# Patient Record
Sex: FEMALE | Race: WHITE | NOT HISPANIC OR LATINO | Employment: OTHER | URBAN - METROPOLITAN AREA
[De-identification: names, ages, dates, MRNs, and addresses within clinical notes are randomized per-mention and may not be internally consistent; named-entity substitution may affect disease eponyms.]

---

## 2024-06-04 ENCOUNTER — APPOINTMENT (OUTPATIENT)
Dept: OTHER | Facility: HOSPITAL | Age: 74
End: 2024-06-04
Payer: MEDICARE

## 2024-06-05 ENCOUNTER — APPOINTMENT (OUTPATIENT)
Dept: CARDIOLOGY | Facility: HOSPITAL | Age: 74
End: 2024-06-05
Payer: MEDICARE

## 2024-06-05 ENCOUNTER — PREP FOR SURGERY (OUTPATIENT)
Dept: OTHER | Facility: HOSPITAL | Age: 74
End: 2024-06-05
Payer: MEDICARE

## 2024-06-05 ENCOUNTER — ANESTHESIA EVENT (OUTPATIENT)
Dept: PERIOP | Facility: HOSPITAL | Age: 74
End: 2024-06-05
Payer: MEDICARE

## 2024-06-05 ENCOUNTER — HOSPITAL ENCOUNTER (INPATIENT)
Facility: HOSPITAL | Age: 74
LOS: 8 days | Discharge: HOME HEALTH | End: 2024-06-13
Attending: THORACIC SURGERY (CARDIOTHORACIC VASCULAR SURGERY) | Admitting: THORACIC SURGERY (CARDIOTHORACIC VASCULAR SURGERY)
Payer: MEDICARE

## 2024-06-05 ENCOUNTER — APPOINTMENT (OUTPATIENT)
Dept: GENERAL RADIOLOGY | Facility: HOSPITAL | Age: 74
End: 2024-06-05
Payer: MEDICARE

## 2024-06-05 DIAGNOSIS — Z98.890 POSTOPERATIVE HYPOXIA: ICD-10-CM

## 2024-06-05 DIAGNOSIS — R09.02 POSTOPERATIVE HYPOXIA: ICD-10-CM

## 2024-06-05 DIAGNOSIS — I25.110 CORONARY ARTERY DISEASE INVOLVING NATIVE CORONARY ARTERY OF NATIVE HEART WITH UNSTABLE ANGINA PECTORIS: Primary | ICD-10-CM

## 2024-06-05 DIAGNOSIS — J95.89 ACUTE RESPIRATORY INSUFFICIENCY, POSTOPERATIVE: ICD-10-CM

## 2024-06-05 DIAGNOSIS — Z95.1 S/P CABG X 3: ICD-10-CM

## 2024-06-05 PROBLEM — I21.4 NON-STEMI (NON-ST ELEVATED MYOCARDIAL INFARCTION): Status: ACTIVE | Noted: 2024-06-05

## 2024-06-05 PROBLEM — I25.10 CAD (CORONARY ARTERY DISEASE): Status: ACTIVE | Noted: 2024-06-05

## 2024-06-05 LAB
ABO GROUP BLD: NORMAL
ALBUMIN SERPL-MCNC: 3.9 G/DL (ref 3.5–5.2)
ALBUMIN/GLOB SERPL: 1.4 G/DL
ALP SERPL-CCNC: 69 U/L (ref 39–117)
ALT SERPL W P-5'-P-CCNC: 22 U/L (ref 1–33)
ANION GAP SERPL CALCULATED.3IONS-SCNC: 11.4 MMOL/L (ref 5–15)
APTT PPP: 43.2 SECONDS (ref 24–31)
ARTERIAL PATENCY WRIST A: POSITIVE
AST SERPL-CCNC: 38 U/L (ref 1–32)
ATMOSPHERIC PRESS: ABNORMAL MM[HG]
BASE EXCESS BLDA CALC-SCNC: 0.4 MMOL/L (ref 0–3)
BASOPHILS # BLD AUTO: 0.02 10*3/MM3 (ref 0–0.2)
BASOPHILS NFR BLD AUTO: 0.4 % (ref 0–1.5)
BDY SITE: ABNORMAL
BH CV XLRA MEAS - DIST GSV CALF DIST LEFT: 0.26 CM
BH CV XLRA MEAS - DIST GSV CALF DIST RIGHT: 0.3 CM
BH CV XLRA MEAS - DIST GSV THIGH DIST LEFT: 0.21 CM
BH CV XLRA MEAS - DIST GSV THIGH DIST RIGHT: 0.44 CM
BH CV XLRA MEAS - MID GSV CALF LEFT: 0.31 CM
BH CV XLRA MEAS - MID GSV CALF RIGHT: 0.27 CM
BH CV XLRA MEAS - MID GSV THIGH  LEFT: 0.4 CM
BH CV XLRA MEAS - MID GSV THIGH  RIGHT: 0.43 CM
BH CV XLRA MEAS - PROX GSV CALF DIST LEFT: 0.37 CM
BH CV XLRA MEAS - PROX GSV CALF DIST RIGHT: 0.35 CM
BH CV XLRA MEAS - PROX GSV THIGH  LEFT: 0.35 CM
BH CV XLRA MEAS - PROX GSV THIGH  RIGHT: 0.73 CM
BH CV XLRA MEAS LEFT CAROTID BULB EDV: 29.5 CM/SEC
BH CV XLRA MEAS LEFT CAROTID BULB PSV: 118 CM/SEC
BH CV XLRA MEAS LEFT DIST CCA EDV: 25.1 CM/SEC
BH CV XLRA MEAS LEFT DIST CCA PSV: 127 CM/SEC
BH CV XLRA MEAS LEFT DIST ICA EDV: -22.3 CM/SEC
BH CV XLRA MEAS LEFT DIST ICA PSV: -73 CM/SEC
BH CV XLRA MEAS LEFT ICA/CCA RATIO: -0.76
BH CV XLRA MEAS LEFT PROX CCA EDV: 22.5 CM/SEC
BH CV XLRA MEAS LEFT PROX CCA PSV: 122 CM/SEC
BH CV XLRA MEAS LEFT PROX ECA PSV: -109 CM/SEC
BH CV XLRA MEAS LEFT PROX ICA EDV: -27.7 CM/SEC
BH CV XLRA MEAS LEFT PROX ICA PSV: -96.2 CM/SEC
BH CV XLRA MEAS LEFT PROX SCLA PSV: 148 CM/SEC
BH CV XLRA MEAS LEFT VERTEBRAL A EDV: -20.5 CM/SEC
BH CV XLRA MEAS LEFT VERTEBRAL A PSV: -90.7 CM/SEC
BH CV XLRA MEAS RIGHT CAROTID BULB EDV: -13 CM/SEC
BH CV XLRA MEAS RIGHT CAROTID BULB PSV: -73.9 CM/SEC
BH CV XLRA MEAS RIGHT DIST CCA EDV: 21.7 CM/SEC
BH CV XLRA MEAS RIGHT DIST CCA PSV: 92.6 CM/SEC
BH CV XLRA MEAS RIGHT DIST ICA EDV: -18.6 CM/SEC
BH CV XLRA MEAS RIGHT DIST ICA PSV: -59 CM/SEC
BH CV XLRA MEAS RIGHT ICA/CCA RATIO: -0.76
BH CV XLRA MEAS RIGHT PROX CCA EDV: -14.9 CM/SEC
BH CV XLRA MEAS RIGHT PROX CCA PSV: -79.5 CM/SEC
BH CV XLRA MEAS RIGHT PROX ECA PSV: -102 CM/SEC
BH CV XLRA MEAS RIGHT PROX ICA EDV: -24.6 CM/SEC
BH CV XLRA MEAS RIGHT PROX ICA PSV: -70.6 CM/SEC
BH CV XLRA MEAS RIGHT PROX SCLA PSV: 161 CM/SEC
BH CV XLRA MEAS RIGHT VERTEBRAL A EDV: -11.4 CM/SEC
BH CV XLRA MEAS RIGHT VERTEBRAL A PSV: -42.1 CM/SEC
BILIRUB SERPL-MCNC: 0.4 MG/DL (ref 0–1.2)
BILIRUB UR QL STRIP: NEGATIVE
BLD GP AB SCN SERPL QL: NEGATIVE
BUN SERPL-MCNC: 18 MG/DL (ref 8–23)
BUN/CREAT SERPL: 18.4 (ref 7–25)
CALCIUM SPEC-SCNC: 9.2 MG/DL (ref 8.6–10.5)
CHLORIDE SERPL-SCNC: 104 MMOL/L (ref 98–107)
CHOLEST SERPL-MCNC: 223 MG/DL (ref 0–200)
CLARITY UR: CLEAR
CLOSE TME COLL+ADP + EPINEP PNL BLD: 95 % (ref 86–100)
CO2 BLDA-SCNC: 25.6 MMOL/L (ref 22–29)
CO2 SERPL-SCNC: 23.6 MMOL/L (ref 22–29)
COLOR UR: YELLOW
CREAT SERPL-MCNC: 0.98 MG/DL (ref 0.57–1)
DEPRECATED RDW RBC AUTO: 50.8 FL (ref 37–54)
EGFRCR SERPLBLD CKD-EPI 2021: 60.3 ML/MIN/1.73
EOSINOPHIL # BLD AUTO: 0.07 10*3/MM3 (ref 0–0.4)
EOSINOPHIL NFR BLD AUTO: 1.3 % (ref 0.3–6.2)
ERYTHROCYTE [DISTWIDTH] IN BLOOD BY AUTOMATED COUNT: 14.1 % (ref 12.3–15.4)
GLOBULIN UR ELPH-MCNC: 2.8 GM/DL
GLUCOSE SERPL-MCNC: 131 MG/DL (ref 65–99)
GLUCOSE UR STRIP-MCNC: NEGATIVE MG/DL
HCO3 BLDA-SCNC: 24.4 MMOL/L (ref 21–28)
HCT VFR BLD AUTO: 41.5 % (ref 34–46.6)
HDLC SERPL-MCNC: 42 MG/DL (ref 40–60)
HEMODILUTION: NO
HGB BLD-MCNC: 13.1 G/DL (ref 12–15.9)
HGB UR QL STRIP.AUTO: NEGATIVE
IMM GRANULOCYTES # BLD AUTO: 0.02 10*3/MM3 (ref 0–0.05)
IMM GRANULOCYTES NFR BLD AUTO: 0.4 % (ref 0–0.5)
INR PPP: 0.97 (ref 0.93–1.1)
KETONES UR QL STRIP: NEGATIVE
LDLC SERPL CALC-MCNC: 125 MG/DL (ref 0–100)
LDLC/HDLC SERPL: 2.81 {RATIO}
LEUKOCYTE ESTERASE UR QL STRIP.AUTO: NEGATIVE
LYMPHOCYTES # BLD AUTO: 1.85 10*3/MM3 (ref 0.7–3.1)
LYMPHOCYTES NFR BLD AUTO: 34.4 % (ref 19.6–45.3)
MCH RBC QN AUTO: 31.1 PG (ref 26.6–33)
MCHC RBC AUTO-ENTMCNC: 31.6 G/DL (ref 31.5–35.7)
MCV RBC AUTO: 98.6 FL (ref 79–97)
MODALITY: ABNORMAL
MONOCYTES # BLD AUTO: 0.47 10*3/MM3 (ref 0.1–0.9)
MONOCYTES NFR BLD AUTO: 8.7 % (ref 5–12)
MRSA DNA SPEC QL NAA+PROBE: NORMAL
NEUTROPHILS NFR BLD AUTO: 2.95 10*3/MM3 (ref 1.7–7)
NEUTROPHILS NFR BLD AUTO: 54.8 % (ref 42.7–76)
NITRITE UR QL STRIP: NEGATIVE
NRBC BLD AUTO-RTO: 0 /100 WBC (ref 0–0.2)
PCO2 BLDA: 36.9 MM HG (ref 35–48)
PH BLDA: 7.43 PH UNITS (ref 7.35–7.45)
PH UR STRIP.AUTO: 5.5 [PH] (ref 5–8)
PLATELET # BLD AUTO: 202 10*3/MM3 (ref 140–450)
PMV BLD AUTO: 9.1 FL (ref 6–12)
PO2 BLDA: 72.1 MM HG (ref 83–108)
POTASSIUM SERPL-SCNC: 3.6 MMOL/L (ref 3.5–5.2)
PROT SERPL-MCNC: 6.7 G/DL (ref 6–8.5)
PROT UR QL STRIP: NEGATIVE
PROTHROMBIN TIME: 10.6 SECONDS (ref 9.6–11.7)
RBC # BLD AUTO: 4.21 10*6/MM3 (ref 3.77–5.28)
RH BLD: POSITIVE
SAO2 % BLDCOA: 94.8 % (ref 94–98)
SARS-COV-2 RNA RESP QL NAA+PROBE: NOT DETECTED
SODIUM SERPL-SCNC: 139 MMOL/L (ref 136–145)
SP GR UR STRIP: 1.02 (ref 1–1.03)
T&S EXPIRATION DATE: NORMAL
TRIGL SERPL-MCNC: 314 MG/DL (ref 0–150)
UROBILINOGEN UR QL STRIP: NORMAL
VLDLC SERPL-MCNC: 56 MG/DL (ref 5–40)
WBC NRBC COR # BLD AUTO: 5.38 10*3/MM3 (ref 3.4–10.8)

## 2024-06-05 PROCEDURE — 99223 1ST HOSP IP/OBS HIGH 75: CPT | Performed by: NURSE PRACTITIONER

## 2024-06-05 PROCEDURE — 83036 HEMOGLOBIN GLYCOSYLATED A1C: CPT | Performed by: THORACIC SURGERY (CARDIOTHORACIC VASCULAR SURGERY)

## 2024-06-05 PROCEDURE — 85576 BLOOD PLATELET AGGREGATION: CPT | Performed by: THORACIC SURGERY (CARDIOTHORACIC VASCULAR SURGERY)

## 2024-06-05 PROCEDURE — 81003 URINALYSIS AUTO W/O SCOPE: CPT | Performed by: PHYSICIAN ASSISTANT

## 2024-06-05 PROCEDURE — 36600 WITHDRAWAL OF ARTERIAL BLOOD: CPT

## 2024-06-05 PROCEDURE — 80061 LIPID PANEL: CPT | Performed by: THORACIC SURGERY (CARDIOTHORACIC VASCULAR SURGERY)

## 2024-06-05 PROCEDURE — 93880 EXTRACRANIAL BILAT STUDY: CPT | Performed by: SURGERY

## 2024-06-05 PROCEDURE — 86850 RBC ANTIBODY SCREEN: CPT | Performed by: THORACIC SURGERY (CARDIOTHORACIC VASCULAR SURGERY)

## 2024-06-05 PROCEDURE — 86900 BLOOD TYPING SEROLOGIC ABO: CPT | Performed by: THORACIC SURGERY (CARDIOTHORACIC VASCULAR SURGERY)

## 2024-06-05 PROCEDURE — 86901 BLOOD TYPING SEROLOGIC RH(D): CPT | Performed by: THORACIC SURGERY (CARDIOTHORACIC VASCULAR SURGERY)

## 2024-06-05 PROCEDURE — 80053 COMPREHEN METABOLIC PANEL: CPT | Performed by: THORACIC SURGERY (CARDIOTHORACIC VASCULAR SURGERY)

## 2024-06-05 PROCEDURE — 93880 EXTRACRANIAL BILAT STUDY: CPT

## 2024-06-05 PROCEDURE — 82803 BLOOD GASES ANY COMBINATION: CPT

## 2024-06-05 PROCEDURE — 87635 SARS-COV-2 COVID-19 AMP PRB: CPT | Performed by: PHYSICIAN ASSISTANT

## 2024-06-05 PROCEDURE — 71045 X-RAY EXAM CHEST 1 VIEW: CPT

## 2024-06-05 PROCEDURE — 87641 MR-STAPH DNA AMP PROBE: CPT | Performed by: PHYSICIAN ASSISTANT

## 2024-06-05 PROCEDURE — 86901 BLOOD TYPING SEROLOGIC RH(D): CPT

## 2024-06-05 PROCEDURE — 86900 BLOOD TYPING SEROLOGIC ABO: CPT

## 2024-06-05 PROCEDURE — 93970 EXTREMITY STUDY: CPT

## 2024-06-05 PROCEDURE — 85610 PROTHROMBIN TIME: CPT | Performed by: THORACIC SURGERY (CARDIOTHORACIC VASCULAR SURGERY)

## 2024-06-05 PROCEDURE — 85730 THROMBOPLASTIN TIME PARTIAL: CPT | Performed by: THORACIC SURGERY (CARDIOTHORACIC VASCULAR SURGERY)

## 2024-06-05 PROCEDURE — 93005 ELECTROCARDIOGRAM TRACING: CPT | Performed by: PHYSICIAN ASSISTANT

## 2024-06-05 PROCEDURE — 85025 COMPLETE CBC W/AUTO DIFF WBC: CPT | Performed by: THORACIC SURGERY (CARDIOTHORACIC VASCULAR SURGERY)

## 2024-06-05 PROCEDURE — 93970 EXTREMITY STUDY: CPT | Performed by: SURGERY

## 2024-06-05 PROCEDURE — 86923 COMPATIBILITY TEST ELECTRIC: CPT

## 2024-06-05 RX ORDER — ACETAMINOPHEN 325 MG/1
650 TABLET ORAL EVERY 4 HOURS PRN
Status: DISCONTINUED | OUTPATIENT
Start: 2024-06-05 | End: 2024-06-06

## 2024-06-05 RX ORDER — SODIUM CHLORIDE 0.9 % (FLUSH) 0.9 %
10 SYRINGE (ML) INJECTION AS NEEDED
Status: DISCONTINUED | OUTPATIENT
Start: 2024-06-05 | End: 2024-06-06

## 2024-06-05 RX ORDER — HYDRALAZINE HYDROCHLORIDE 20 MG/ML
10 INJECTION INTRAMUSCULAR; INTRAVENOUS EVERY 6 HOURS PRN
Status: DISCONTINUED | OUTPATIENT
Start: 2024-06-05 | End: 2024-06-06

## 2024-06-05 RX ORDER — NITROGLYCERIN 0.4 MG/1
0.4 TABLET SUBLINGUAL
Status: DISCONTINUED | OUTPATIENT
Start: 2024-06-05 | End: 2024-06-06

## 2024-06-05 RX ORDER — ASPIRIN 81 MG/1
81 TABLET ORAL DAILY
Status: DISCONTINUED | OUTPATIENT
Start: 2024-06-05 | End: 2024-06-06

## 2024-06-05 RX ORDER — POTASSIUM CHLORIDE 20 MEQ/1
40 TABLET, EXTENDED RELEASE ORAL EVERY 4 HOURS
Qty: 4 TABLET | Refills: 0 | Status: COMPLETED | OUTPATIENT
Start: 2024-06-06 | End: 2024-06-06

## 2024-06-05 RX ORDER — SODIUM CHLORIDE 0.9 % (FLUSH) 0.9 %
10 SYRINGE (ML) INJECTION EVERY 12 HOURS SCHEDULED
Status: DISCONTINUED | OUTPATIENT
Start: 2024-06-05 | End: 2024-06-06

## 2024-06-05 RX ORDER — SODIUM CHLORIDE 9 MG/ML
40 INJECTION, SOLUTION INTRAVENOUS AS NEEDED
Status: DISCONTINUED | OUTPATIENT
Start: 2024-06-05 | End: 2024-06-06

## 2024-06-05 RX ORDER — LORAZEPAM 0.5 MG/1
0.5 TABLET ORAL EVERY 6 HOURS PRN
Status: DISCONTINUED | OUTPATIENT
Start: 2024-06-05 | End: 2024-06-06

## 2024-06-05 RX ORDER — ONDANSETRON 2 MG/ML
4 INJECTION INTRAMUSCULAR; INTRAVENOUS EVERY 6 HOURS PRN
Status: DISCONTINUED | OUTPATIENT
Start: 2024-06-05 | End: 2024-06-06

## 2024-06-05 RX ORDER — DIPHENHYDRAMINE HCL 25 MG
25 CAPSULE ORAL NIGHTLY PRN
Status: DISCONTINUED | OUTPATIENT
Start: 2024-06-05 | End: 2024-06-06

## 2024-06-05 RX ORDER — HYDROCODONE BITARTRATE AND ACETAMINOPHEN 5; 325 MG/1; MG/1
1 TABLET ORAL EVERY 4 HOURS PRN
Status: DISCONTINUED | OUTPATIENT
Start: 2024-06-05 | End: 2024-06-06

## 2024-06-05 RX ORDER — HEPARIN SODIUM 10000 [USP'U]/100ML
12 INJECTION, SOLUTION INTRAVENOUS
Status: DISCONTINUED | OUTPATIENT
Start: 2024-06-05 | End: 2024-06-06

## 2024-06-05 RX ORDER — ALPRAZOLAM 0.25 MG/1
0.25 TABLET ORAL EVERY 8 HOURS PRN
Status: DISCONTINUED | OUTPATIENT
Start: 2024-06-05 | End: 2024-06-06

## 2024-06-05 RX ADMIN — METOPROLOL TARTRATE 25 MG: 25 TABLET, FILM COATED ORAL at 21:15

## 2024-06-05 RX ADMIN — NITROGLYCERIN 1 INCH: 20 OINTMENT TOPICAL at 17:58

## 2024-06-05 RX ADMIN — Medication 10 ML: at 21:15

## 2024-06-05 RX ADMIN — Medication 10 ML: at 21:16

## 2024-06-05 RX ADMIN — ASPIRIN 81 MG: 81 TABLET, COATED ORAL at 17:58

## 2024-06-05 NOTE — CONSULTS
Patient Care Team:  Seymour Aldridge MD as PCP - General (Internal Medicine)  Referring Provider:  Dr. Dc Thompson  Reason for consultation:  MV CAD including left main disease    Chief complaint:  chest burning    Subjective     History of Present Illness:  75 y/o woman with chest burning on exertion for past couple weeks.  Associated symptoms included fatigue, leg swelling/ heaviness.  She saw her PCP, had some labs drawn and was called her in the evening that her troponin was elevated.   She then presented to [facility] in [place] and ruled in for Non STEMI.  She has HTN, untreated HLD with statin intolerance, DM type 2, CKD stage 3, and obesity.  She underwent cardiac cath showed MV CAD-- 99% LAD, Diagonal 95% RCA, and 80% left main.  Echo showed EF 51% valves are normal.  She was transferred to [facility] for further care and evaluation for CABG.    Review of Systems   Constitutional:  Positive for fatigue.   Cardiovascular:  Positive for chest pain and leg swelling.   Neurological:  Positive for headaches.        Past Medical History:   Diagnosis Date    Coronary artery disease     Diabetes mellitus     Elevated cholesterol     Hypertension      Past Surgical History:   Procedure Laterality Date    APPENDECTOMY      BREAST SURGERY      COLONOSCOPY      SKIN BIOPSY       No family history on file.  Social History     Tobacco Use    Smoking status: Former     Current packs/day: 0.00     Types: Cigarettes     Quit date:      Years since quittin.4     Passive exposure: Never    Smokeless tobacco: Never   Vaping Use    Vaping status: Never Used   Substance Use Topics    Alcohol use: Not Currently    Drug use: Never     No medications prior to admission.     aspirin, 81 mg, Oral, Daily  ceFAZolin, 2,000 mg, Intravenous, Once  metoprolol tartrate, 25 mg, Oral, Q12H  nitroglycerin, 1 inch, Topical, Q6H  sodium chloride, 10 mL, Intravenous, Q12H  sodium chloride, 10 mL, Intravenous,  "Q12H      Allergies:  Statins    Objective      Vital Signs  Temp:  [97.7 °F (36.5 °C)-98.2 °F (36.8 °C)] 97.8 °F (36.6 °C)  Heart Rate:  [80-89] 85  Resp:  [14-20] 20  BP: (108-158)/(58-92) 108/59      162.6 cm (64\")    Physical Exam    Results Review:   Lab Results (last 24 hours)       Procedure Component Value Units Date/Time    Potassium [[id number]] Collected: 06/06/24 0831    Specimen: Blood Updated: 06/06/24 0835    aPTT [[id number]] Collected: 06/06/24 0831    Specimen: Blood Updated: 06/06/24 0835    Hemoglobin A1c [[id number]]  (Abnormal) Collected: 06/05/24 2228    Specimen: Blood Updated: 06/06/24 0027     Hemoglobin A1C 6.74 %     aPTT [[id number]]  (Abnormal) Collected: 06/05/24 2326    Specimen: Blood Updated: 06/05/24 2348     PTT 43.2 seconds     Protime-INR [[id number]]  (Normal) Collected: 06/05/24 2326    Specimen: Blood Updated: 06/05/24 2348     Protime 10.6 Seconds      INR 0.97    Comprehensive Metabolic Panel [[id number]]  (Abnormal) Collected: 06/05/24 2228    Specimen: Blood Updated: 06/05/24 2307     Glucose 131 mg/dL      BUN 18 mg/dL      Creatinine 0.98 mg/dL      Sodium 139 mmol/L      Potassium 3.6 mmol/L      Comment: Slight hemolysis detected by analyzer. Result may be falsely elevated.        Chloride 104 mmol/L      CO2 23.6 mmol/L      Calcium 9.2 mg/dL      Total Protein 6.7 g/dL      Albumin 3.9 g/dL      ALT (SGPT) 22 U/L      AST (SGOT) 38 U/L      Alkaline Phosphatase 69 U/L      Total Bilirubin 0.4 mg/dL      Globulin 2.8 gm/dL      A/G Ratio 1.4 g/dL      BUN/Creatinine Ratio 18.4     Anion Gap 11.4 mmol/L      eGFR 60.3 mL/min/1.73     Narrative:      GFR Normal >60  Chronic Kidney Disease <60  Kidney Failure <15    The GFR formula is only valid for adults with stable renal function between ages 18 and 70.    Lipid Panel [[id number]]  (Abnormal) Collected: 06/05/24 2228    Specimen: Blood Updated: 06/05/24 2306     Total Cholesterol 223 mg/dL      Triglycerides 314 mg/dL      " HDL Cholesterol 42 mg/dL      LDL Cholesterol  125 mg/dL      VLDL Cholesterol 56 mg/dL      LDL/HDL Ratio 2.81    Narrative:      Cholesterol Reference Ranges  (U.S. Department of Health and Human Services ATP III Classifications)    Desirable          <200 mg/dL  Borderline High    200-239 mg/dL  High Risk          >240 mg/dL      Triglyceride Reference Ranges  (U.S. Department of Health and Human Services ATP III Classifications)    Normal           <150 mg/dL  Borderline High  150-199 mg/dL  High             200-499 mg/dL  Very High        >500 mg/dL    HDL Reference Ranges  (U.S. Department of Health and Human Services ATP III Classifications)    Low     <40 mg/dl (major risk factor for CHD)  High    >60 mg/dl ('negative' risk factor for CHD)        LDL Reference Ranges  (U.S. Department of Health and Human Services ATP III Classifications)    Optimal          <100 mg/dL  Near Optimal     100-129 mg/dL  Borderline High  130-159 mg/dL  High             160-189 mg/dL  Very High        >189 mg/dL    Urinalysis With Culture If Indicated - Urine, Clean Catch [[id number]]  (Normal) Collected: 06/05/24 2242    Specimen: Urine, Clean Catch Updated: 06/05/24 2254     Color, UA Yellow     Appearance, UA Clear     pH, UA 5.5     Specific Gravity, UA 1.019     Glucose, UA Negative     Ketones, UA Negative     Bilirubin, UA Negative     Blood, UA Negative     Protein, UA Negative     Leuk Esterase, UA Negative     Nitrite, UA Negative     Urobilinogen, UA 1.0 E.U./dL    Narrative:      In absence of clinical symptoms, the presence of pyuria, bacteria, and/or nitrites on the urinalysis result does not correlate with infection.  Urine microscopic not indicated.    CBC & Differential [[id number]]  (Abnormal) Collected: 06/05/24 2228    Specimen: Blood Updated: 06/05/24 2240    Narrative:      The following orders were created for panel order CBC & Differential.  Procedure                               Abnormality         Status                      ---------                               -----------         ------                     CBC Auto Differential[[id number]]        Abnormal            Final result                 Please view results for these tests on the individual orders.    CBC Auto Differential [[id number]]  (Abnormal) Collected: 06/05/24 2228    Specimen: Blood Updated: 06/05/24 2240     WBC 5.38 10*3/mm3      RBC 4.21 10*6/mm3      Hemoglobin 13.1 g/dL      Hematocrit 41.5 %      MCV 98.6 fL      MCH 31.1 pg      MCHC 31.6 g/dL      RDW 14.1 %      RDW-SD 50.8 fl      MPV 9.1 fL      Platelets 202 10*3/mm3      Neutrophil % 54.8 %      Lymphocyte % 34.4 %      Monocyte % 8.7 %      Eosinophil % 1.3 %      Basophil % 0.4 %      Immature Grans % 0.4 %      Neutrophils, Absolute 2.95 10*3/mm3      Lymphocytes, Absolute 1.85 10*3/mm3      Monocytes, Absolute 0.47 10*3/mm3      Eosinophils, Absolute 0.07 10*3/mm3      Basophils, Absolute 0.02 10*3/mm3      Immature Grans, Absolute 0.02 10*3/mm3      nRBC 0.0 /100 WBC     Platelet Function ADP [[id number]]  (Normal) Collected: 06/05/24 2228    Specimen: Blood Updated: 06/05/24 2236     ADP Aggregation, % Platelet 95 %     MRSA Screen, PCR (Inpatient) - Swab, Nares [[id number]]  (Normal) Collected: 06/05/24 1808    Specimen: Swab from Nares Updated: 06/05/24 1928     MRSA PCR No MRSA Detected    Narrative:      The negative predictive value of this diagnostic test is high and should only be used to consider de-escalating anti-MRSA therapy. A positive result may indicate colonization with MRSA and must be correlated clinically.    COVID PRE-OP / PRE-PROCEDURE SCREENING ORDER (NO ISOLATION) - Swab, Nasopharynx [[id number]]  (Normal) Collected: 06/05/24 1808    Specimen: Swab from Nasopharynx Updated: 06/05/24 1836    Narrative:      The following orders were created for panel order COVID PRE-OP / PRE-PROCEDURE SCREENING ORDER (NO ISOLATION) - Swab, Nasopharynx.  Procedure                                Abnormality         Status                     ---------                               -----------         ------                     COVID-19,CEPHEID/TERESSA,CO...[[id number]]  Normal              Final result                 Please view results for these tests on the individual orders.    COVID-19,CEPHEID/TERESSA,COR/YAIR/PAD/ALEXSANDER/LAG/DIDI IN-HOUSE,NP SWAB IN TRANSPORT MEDIA 1 HR TAT, RT-PCR - Swab, Nasopharynx [[id number]]  (Normal) Collected: 06/05/24 1808    Specimen: Swab from Nasopharynx Updated: 06/05/24 1836     COVID19 Not Detected    Narrative:      Fact sheet for providers: https://www.fda.gov/media/905346/download     Fact sheet for patients: https://www.fda.gov/media/188648/download  Fact sheet for providers: https://www.fda.gov/media/548413/download    Fact sheet for patients: https://www.fda.gov/media/869331/download    Test performed by PCR.    Blood Gas, Arterial - [[id number]]  (Abnormal) Collected: 06/05/24 1749    Specimen: Arterial Blood Updated: 06/05/24 1752     Site Right Brachial     Bienvenido's Test Positive     pH, Arterial 7.429 pH units      pCO2, Arterial 36.9 mm Hg      pO2, Arterial 72.1 mm Hg      HCO3, Arterial 24.4 mmol/L      Base Excess, Arterial 0.4 mmol/L      Comment: Serial Number: [id number]:  [id number]        O2 Saturation, Arterial 94.8 %      CO2 Content 25.6 mmol/L      Barometric Pressure for Blood Gas --     Comment: N/A        Modality Room Air     Hemodilution No          Cardiac cath per  Day 6/5/2024  Hemodynamics:     LV pressure:  140/8 with a mean of 16 mm Hg         AR pressure:  139/74 with a mean of 102 mm Hg          Coronary Arteries:     Left main coronary artery:  The left main coronary artery heavily   calcified with an ostial 50% stenosis.  Catheter ventricularization with   engagement.  There has a mid segment 50% stenosis.     LAD coronary artery:  The left anterior descending coronary artery  a   calcified vessel with a 40-50% stenosis at the takeoff  of the 1st diagonal   branch.  The mid LAD then has a long calcified 80% stenosis just after the   takeoff of a large 2nd diagonal branch that runs in parallel with the LAD.    The 2nd diagonal branch has a proximal 99% stenosis with HANNAH 2 flow.   Left circumflex artery:  The left circumflex coronary artery  is a small   nondominant vessel free of significant obstructive disease.   Right coronary artery:  The right coronary artery  is a large dominant   vessel with a mid segment 95% stenosis.  The right PDA and right   posterolateral have diffuse luminal irregularities but free of significant   obstructive disease.         Conclusion:  Coronary artery disease including a heavily calcified left   main, critical stenosis of the LAD and large 1st diagonal that runs in   parallel, and the dominant right coronary artery with known preserved LV   function by echocardiogram     Echo 6/5/2024  2D ECHO   LV Diastolic Diameter MILLY 4.3 cm 4.2 - 5.9 / 3 Aortic Root Diameter 3.0 cm   LV Systolic Diameter PLAX 2.4 cm 2.3-4.0 cm LA Systolic Diameter LX 3.4 cm 3.0 - 4.0 / 2.7 - 3.8 cm   LV Fractional Shortening 44.2 % LA Volume 30.9 cm3 18 - 58 / 22 - 52 cm3   IVS Diastolic Thickness 1.3 cm 0.6 - 1.0 / 0 LA Volume Index 14.1 cm3/m 16 - 28 cm3/m2   LVPW Diastolic Thickness 1.0 cm 0.6 - 1.0 / 0 Ascending Aorta Diameter 3.5 cm   LVOT Diameter 2.0 cm     DOPPLER   AV Peak Velocity 166.0 cm/s LV E' Septal Velocity 4.7 cm/s   AV Peak Gradient 11.0 mmHg LV A' Septal Velocity 8.1 cm/s   LVOT Peak Velocity 93.3 cm/s TR Peak Velocity 81.4 cm/s   LVOT Peak Gradient 3.5 mmHg TR Peak Gradient 2.7 mmHg   LVOT Velocity Time Integr 22.8 cm TV Peak E Velocity 46.5 cm/s   LVOT Cardiac Index 2320.6 cm3 TV Peak A Velocity 61.8 cm/s   AV Area Cont Eq pk 1.8 cm2 TV E to A Ratio 0.8   Mitral A Point Velocity 126.0 cm/s PV Peak Velocity 103.0 cm/s   LV E' Lateral Velocity 7.6 cm/s PV Peak Gradient 4.2 mmHg   LV A' Lateral Velocity 12.2 cm/s        FINDINGS   Left Ventricle Left ventricular hypertrophy. Normal left ventricular cavity size. Left ventricular ejection fraction is   estimated at 55-60%. No regional wall motion abnormalities.   LV Diastolic Stage I diastolic dysfunction.   Function   Right Ventricle The right ventricle is normal in size and function.   Right Atrium The right atrium is normal in size.   Left Atrium The left atrium is normal in size.   Mitral Valve Structurally normal mitral valve without significant stenosis or prolapse. There is trace mitral regurgitation.   Aortic Valve Structurally normal aortic valve without significant stenosis. Mild aortic valve calcification. There is no   aortic regurgitation.   Tricuspid Valve Structurally normal tricuspid valve without significant stenosis. There is trace regurgitation. Pulmonary   artery systolic pressure is normal.   Pulmonic Valve Structurally normal pulmonic valve without significant stenosis. There is trace pulmonic regurgitation.   Pericardium Normal pericardium without effusion.   Aorta Ascending aorta measures ~ 3.5 cm.   Miscelleaneous Normal IVC dimension with >50% respiratory change of the inferior vena cava.   CONCLUSIONS   Left ventricular hypertrophy. Normal left ventricular cavity size. Left ventricular ejection fraction is estimated at 55-60%. No   regional wall motion abnormalities.   Stage I diastolic dysfunction.   No obvious structural valvular pathology.   No hemodynamically significant valvular lesions.   No significant pericardial effusion.   Proximal aorta is without obvious abnormality.   Normal pulmonary artery pressure.       Assessment & Plan       CAD (coronary artery disease)    Non-STEMI (non-ST elevated myocardial infarction)      Assessment & Plan    - MV CAD including left main disease, EF 50% (echo)--surgical workup ongoing  - NSTEMI presentation  - HTN  - HLD, statin intolerant--not taking Repatha d/t cost  - DM type 2  - CKD stage 3  -  Transaminitis  - Obesity, stage 3--BMI 39.9    Thank you for allowing us to participate in the care of this patient.      Plan CABG in AM. I discussed all this and all the risks with the patient    Madeline BacaPatrickChris, APRN  06/06/24  08:35 EDT    **all problems new to this examiner  **EKG and CXR independently reviewed and interpreted    Addendum  Patient was seen and examined by me, agree with the findings above.  I have reviewed the records and examined the patient myself, I have reviewed and interpreted myself the cardiac cath and echocardiograms.  She has multivessel coronary artery disease and progressive angina developing into a non-STEMI.  I discussed my recommendation of surgery to prolong survival, symptomatic relief and to prevent adverse events.  I discussed the risks (STS calculated), benefits and terms of surgery and she wishes to proceed.  The plan is completion of the preoperative evaluation and CABG in the next day or 2.  Bienvenido Hall MD    I spent over 45 minutes before, during after the consultation visit in reviewing the records, examining the patient, reviewing interpreting myself the cardiac cath and echocardiograms, discussing the findings and options, discussing my recommendations per guidelines of treatment of coronary artery disease, discussing the risk and benefits and alternatives of the procedure including complications and an operative risk of less than 1%.  I discussed the case with the cardiology team and I spent time in documenting in the electronic record

## 2024-06-05 NOTE — ANESTHESIA PREPROCEDURE EVALUATION
Anesthesia Evaluation     Patient summary reviewed and Nursing notes reviewed   NPO Solid Status: > 8 hours  NPO Liquid Status: > 8 hours           Airway   Mallampati: II  TM distance: >3 FB  Neck ROM: full  No difficulty expected  Dental - normal exam     Pulmonary - negative pulmonary ROS and normal exam    breath sounds clear to auscultation  (+) a smoker Former, Abstained day of surgery, cigarettes,  Cardiovascular - normal exam  Exercise tolerance: unable to assess    ECG reviewed  Rhythm: regular  Rate: normal    (+) hypertension, past MI  1-7 days, CAD, angina, hyperlipidemia    ROS comment: ECHO   CONCLUSIONS    Left ventricular hypertrophy. Normal left ventricular cavity size. Left ventricular ejection fraction is estimated at 55-60%. No     regional wall motion abnormalities.    Stage I diastolic dysfunction.    No obvious structural valvular pathology.     No hemodynamically significant valvular lesions.     No significant pericardial effusion.     Proximal aorta is without obvious abnormality.     Normal pulmonary artery pressure.        CATH  Coronary Arteries:     Left main coronary artery:  The left main coronary artery heavily   calcified with an ostial 50% stenosis.  Catheter ventricularization with   engagement.  There has a mid segment 50% stenosis.     LAD coronary artery:  The left anterior descending coronary artery  a   calcified vessel with a 40-50% stenosis at the takeoff of the 1st diagonal   branch.  The mid LAD then has a long calcified 80% stenosis just after the   takeoff of a large 2nd diagonal branch that runs in parallel with the LAD.    The 2nd diagonal branch has a proximal 99% stenosis with HANNAH 2 flow.     Left circumflex artery:  The left circumflex coronary artery  is a small   nondominant vessel free of significant obstructive disease.     Right coronary artery:  The right coronary artery  is a large dominant   vessel with a mid segment 95% stenosis.  The right PDA and right    posterolateral have diffuse luminal irregularities but free of significant   obstructive disease.         Conclusion:  Coronary artery disease including a heavily calcified left   main, critical stenosis of the LAD and large 1st diagonal that runs in   parallel, and the dominant right coronary artery with known preserved LV   function by echocardiogram.     Recommendations:  Referral for consideration of coronary bypass grafting.                  Dc Thompson MD           Neuro/Psych- negative ROS  GI/Hepatic/Renal/Endo - negative ROS   (+) diabetes mellitus    Musculoskeletal (-) negative ROS    Abdominal  - normal exam   Substance History - negative use     OB/GYN negative ob/gyn ROS         Other - negative ROS       ROS/Med Hx Other: Assessment & Plan    CAD (coronary artery disease)    Non-STEMI (non-ST elevated myocardial infarction)        Assessment & Plan     - MV CAD including left main disease, EF 50% (echo)--surgical workup ongoing  - NSTEMI presentation  - HTN  - HLD, statin intolerant--not taking Repatha d/t cost  - DM type 2  - CKD stage 3  - Transaminitis  - Obesity, stage 3--BMI 39.9     Thank you for allowing us to participate in the care of this patient.       Plan CABG in AM. I discussed all this and all the risks with the patient     Madeline Jung, ARLIN  06/06/24  08:35 EDT     **all problems new to this examiner  **EKG and CXR independently reviewed and interpreted                            Anesthesia Plan    ASA 4     general, Stephanie, CVL and PAC     (NSTEMI, [place])  intravenous induction     Anesthetic plan, risks, benefits, and alternatives have been provided, discussed and informed consent has been obtained with: patient.      CODE STATUS:    Code Status (Patient has no pulse and is not breathing): CPR (Attempt to Resuscitate)  Medical Interventions (Patient has pulse or is breathing): Full Support

## 2024-06-05 NOTE — H&P (VIEW-ONLY)
Patient Care Team:  Seymour Aldridge MD as PCP - General (Internal Medicine)  Referring Provider:  Dr. Dc Thompson  Reason for consultation:  MV CAD including left main disease    Chief complaint:  chest burning    Subjective     History of Present Illness:  75 y/o woman with chest burning. Non STEMI. MVD 99% LAD and Diad 95%RCA 80% left main. EF 51% valves are normal.    Review of Systems   Cardiovascular:  Positive for chest pain.   Neurological:  Positive for headaches.        No past medical history on file.  No past surgical history on file.  No family history on file.     No medications prior to admission.       Allergies:  Patient has no allergy information on record.    Objective      Vital Signs  Temp:  [97.7 °F (36.5 °C)] 97.7 °F (36.5 °C)  Heart Rate:  [83] 83  Resp:  [16] 16  BP: (151)/(83) 151/83           Physical Exam    Results Review:   Lab Results (last 24 hours)       ** No results found for the last 24 hours. **          Cardiac cath per Dr. Thompson 6/5/2024  Hemodynamics:     LV pressure:  140/8 with a mean of 16 mm Hg         AR pressure:  139/74 with a mean of 102 mm Hg          Coronary Arteries:     Left main coronary artery:  The left main coronary artery heavily   calcified with an ostial 50% stenosis.  Catheter ventricularization with   engagement.  There has a mid segment 50% stenosis.     LAD coronary artery:  The left anterior descending coronary artery  a   calcified vessel with a 40-50% stenosis at the takeoff of the 1st diagonal   branch.  The mid LAD then has a long calcified 80% stenosis just after the   takeoff of a large 2nd diagonal branch that runs in parallel with the LAD.    The 2nd diagonal branch has a proximal 99% stenosis with HANNAH 2 flow.     Left circumflex artery:  The left circumflex coronary artery  is a small   nondominant vessel free of significant obstructive disease.     Right coronary artery:  The right coronary artery  is a large dominant   vessel with a mid  segment 95% stenosis.  The right PDA and right   posterolateral have diffuse luminal irregularities but free of significant   obstructive disease.         Conclusion:  Coronary artery disease including a heavily calcified left   main, critical stenosis of the LAD and large 1st diagonal that runs in   parallel, and the dominant right coronary artery with known preserved LV   function by echocardiogram       Assessment & Plan       CAD (coronary artery disease)      Assessment & Plan    - MV CAD including left main disease, EF 50% (echo)--surgical workup ongoing  - HTN  - HLD, statin intolerant--not taking Repatha d/t cost  - DM type 2  - CKD stage 3  - Obesity, stage 3    Thank you for allowing us to participate in the care of this patient.      Plan CABG in AM. I discussed all this and all the risks with the patient    Madeline Jung, ARLIN  06/05/24  17:11 EDT    **all problems new to this examiner  **EKG and CXR independently reviewed and interpreted

## 2024-06-06 ENCOUNTER — APPOINTMENT (OUTPATIENT)
Dept: GENERAL RADIOLOGY | Facility: HOSPITAL | Age: 74
End: 2024-06-06
Payer: MEDICARE

## 2024-06-06 ENCOUNTER — ANESTHESIA (OUTPATIENT)
Dept: PERIOP | Facility: HOSPITAL | Age: 74
End: 2024-06-06
Payer: MEDICARE

## 2024-06-06 ENCOUNTER — OFFICE VISIT (OUTPATIENT)
Dept: PERIOP | Facility: HOSPITAL | Age: 74
End: 2024-06-06
Payer: MEDICARE

## 2024-06-06 PROBLEM — E11.9 TYPE 2 DIABETES MELLITUS, WITHOUT LONG-TERM CURRENT USE OF INSULIN: Status: ACTIVE | Noted: 2024-06-06

## 2024-06-06 PROBLEM — I10 HTN (HYPERTENSION): Status: ACTIVE | Noted: 2024-06-06

## 2024-06-06 PROBLEM — E78.5 HLD (HYPERLIPIDEMIA): Status: ACTIVE | Noted: 2024-06-06

## 2024-06-06 LAB
ACT BLD: 104 SECONDS (ref 89–137)
ACT BLD: 122 SECONDS (ref 89–137)
ACT BLD: 385 SECONDS (ref 89–137)
ACT BLD: 421 SECONDS (ref 89–137)
ACT BLD: 544 SECONDS (ref 89–137)
ALBUMIN SERPL-MCNC: 3.6 G/DL (ref 3.5–5.2)
ANION GAP SERPL CALCULATED.3IONS-SCNC: 8.2 MMOL/L (ref 5–15)
APTT PPP: 22.7 SECONDS (ref 24–31)
APTT PPP: 24.3 SECONDS (ref 24–31)
ARTERIAL PATENCY WRIST A: ABNORMAL
ARTERIAL PATENCY WRIST A: ABNORMAL
ARTERIAL PATENCY WRIST A: POSITIVE
ATMOSPHERIC PRESS: ABNORMAL MM[HG]
BASE DEFICIT: ABNORMAL
BASE EXCESS BLDA CALC-SCNC: -1.8 MMOL/L (ref 0–3)
BASE EXCESS BLDA CALC-SCNC: -2.1 MMOL/L (ref 0–3)
BASE EXCESS BLDA CALC-SCNC: -2.7 MMOL/L (ref 0–3)
BASE EXCESS BLDA CALC-SCNC: 0 MMOL/L (ref 0–3)
BASE EXCESS BLDA CALC-SCNC: <0 MMOL/L (ref 0–3)
BASE EXCESS BLDV CALC-SCNC: <0 MMOL/L (ref 0–3)
BASOPHILS # BLD AUTO: 0.01 10*3/MM3 (ref 0–0.2)
BASOPHILS NFR BLD AUTO: 0.1 % (ref 0–1.5)
BDY SITE: ABNORMAL
BH BB BLOOD EXPIRATION DATE: NORMAL
BH BB BLOOD EXPIRATION DATE: NORMAL
BH BB BLOOD TYPE BARCODE: 6200
BH BB BLOOD TYPE BARCODE: 6200
BH BB DISPENSE STATUS: NORMAL
BH BB DISPENSE STATUS: NORMAL
BH BB PRODUCT CODE: NORMAL
BH BB PRODUCT CODE: NORMAL
BH BB UNIT NUMBER: NORMAL
BH BB UNIT NUMBER: NORMAL
BUN SERPL-MCNC: 16 MG/DL (ref 8–23)
BUN/CREAT SERPL: 16.3 (ref 7–25)
CA-I BLDA-SCNC: 0.97 MMOL/L (ref 1.12–1.32)
CA-I BLDA-SCNC: 1.03 MMOL/L (ref 1.12–1.32)
CA-I BLDA-SCNC: 1.05 MMOL/L (ref 1.12–1.32)
CA-I BLDA-SCNC: 1.16 MMOL/L (ref 1.12–1.32)
CA-I BLDA-SCNC: 1.17 MMOL/L (ref 1.15–1.33)
CA-I BLDA-SCNC: 1.17 MMOL/L (ref 1.15–1.33)
CA-I BLDA-SCNC: 1.18 MMOL/L (ref 1.15–1.33)
CA-I BLDA-SCNC: 1.29 MMOL/L (ref 1.12–1.32)
CA-I SERPL ISE-MCNC: 1.17 MMOL/L (ref 1.2–1.3)
CA-I SERPL ISE-MCNC: >1.9 MMOL/L (ref 1.2–1.3)
CALCIUM SPEC-SCNC: 8.6 MG/DL (ref 8.6–10.5)
CHLORIDE SERPL-SCNC: 108 MMOL/L (ref 98–107)
CLOSE TME COLL+ADP + EPINEP PNL BLD: 95 % (ref 86–100)
CO2 BLDA-SCNC: 23.1 MMOL/L (ref 22–29)
CO2 BLDA-SCNC: 25.4 MMOL/L (ref 22–29)
CO2 BLDA-SCNC: 26 MMOL/L (ref 23–27)
CO2 BLDA-SCNC: 26 MMOL/L (ref 23–27)
CO2 BLDA-SCNC: 27 MMOL/L (ref 23–27)
CO2 BLDA-SCNC: 29 MMOL/L (ref 23–27)
CO2 CONTENT VENOUS: 27 MMOL/L (ref 24–29)
CO2 SERPL-SCNC: 23.8 MMOL/L (ref 22–29)
CREAT BLDA-MCNC: 0.9 MG/DL (ref 0.6–1.3)
CREAT SERPL-MCNC: 0.98 MG/DL (ref 0.57–1)
CROSSMATCH INTERPRETATION: NORMAL
CROSSMATCH INTERPRETATION: NORMAL
DEPRECATED RDW RBC AUTO: 49.6 FL (ref 37–54)
DEPRECATED RDW RBC AUTO: 49.6 FL (ref 37–54)
EGFRCR SERPLBLD CKD-EPI 2021: 60.3 ML/MIN/1.73
EGFRCR SERPLBLD CKD-EPI 2021: 66.8 ML/MIN/1.73
EOSINOPHIL # BLD AUTO: 0.03 10*3/MM3 (ref 0–0.4)
EOSINOPHIL NFR BLD AUTO: 0.3 % (ref 0.3–6.2)
ERYTHROCYTE [DISTWIDTH] IN BLOOD BY AUTOMATED COUNT: 13.9 % (ref 12.3–15.4)
ERYTHROCYTE [DISTWIDTH] IN BLOOD BY AUTOMATED COUNT: 14 % (ref 12.3–15.4)
FIBRINOGEN PPP-MCNC: 226 MG/DL (ref 210–450)
GLUCOSE BLDC GLUCOMTR-MCNC: 109 MG/DL (ref 70–105)
GLUCOSE BLDC GLUCOMTR-MCNC: 111 MG/DL (ref 70–105)
GLUCOSE BLDC GLUCOMTR-MCNC: 121 MG/DL (ref 70–105)
GLUCOSE BLDC GLUCOMTR-MCNC: 131 MG/DL (ref 70–105)
GLUCOSE BLDC GLUCOMTR-MCNC: 142 MG/DL (ref 74–100)
GLUCOSE BLDC GLUCOMTR-MCNC: 142 MG/DL (ref 74–100)
GLUCOSE BLDC GLUCOMTR-MCNC: 143 MG/DL (ref 70–105)
GLUCOSE BLDC GLUCOMTR-MCNC: 148 MG/DL (ref 74–100)
GLUCOSE BLDC GLUCOMTR-MCNC: 148 MG/DL (ref 74–100)
GLUCOSE BLDC GLUCOMTR-MCNC: 154 MG/DL (ref 70–105)
GLUCOSE BLDC GLUCOMTR-MCNC: 157 MG/DL (ref 70–105)
GLUCOSE BLDC GLUCOMTR-MCNC: 167 MG/DL (ref 74–100)
GLUCOSE BLDC GLUCOMTR-MCNC: 167 MG/DL (ref 74–100)
GLUCOSE SERPL-MCNC: 171 MG/DL (ref 65–99)
HBA1C MFR BLD: 6.74 % (ref 4.8–5.6)
HCO3 BLDA-SCNC: 21.5 MMOL/L (ref 21–28)
HCO3 BLDA-SCNC: 22 MMOL/L (ref 21–28)
HCO3 BLDA-SCNC: 24.1 MMOL/L (ref 21–28)
HCO3 BLDA-SCNC: 24.7 MMOL/L (ref 22–26)
HCO3 BLDA-SCNC: 24.7 MMOL/L (ref 22–26)
HCO3 BLDA-SCNC: 25.3 MMOL/L (ref 22–26)
HCO3 BLDA-SCNC: 27.1 MMOL/L (ref 22–26)
HCO3 BLDV-SCNC: 25.2 MMOL/L (ref 23–28)
HCT VFR BLD AUTO: 29.5 % (ref 34–46.6)
HCT VFR BLD AUTO: 36.1 % (ref 34–46.6)
HCT VFR BLDA CALC: 26 % (ref 38–51)
HCT VFR BLDA CALC: 29 % (ref 38–51)
HCT VFR BLDA CALC: 30 % (ref 38–51)
HCT VFR BLDA CALC: 32 % (ref 38–51)
HCT VFR BLDA CALC: 34 % (ref 38–51)
HCT VFR BLDA CALC: 36 % (ref 38–51)
HCT VFR BLDA CALC: 37 % (ref 38–51)
HCT VFR BLDA CALC: 38 % (ref 38–51)
HCT VFR BLDA CALC: 38 % (ref 38–51)
HEMODILUTION: NO
HEMODILUTION: YES
HEMODILUTION: YES
HGB BLD-MCNC: 11.6 G/DL (ref 12–15.9)
HGB BLD-MCNC: 9.6 G/DL (ref 12–15.9)
HGB BLDA-MCNC: 10.2 G/DL (ref 12–17)
HGB BLDA-MCNC: 10.9 G/DL (ref 12–17)
HGB BLDA-MCNC: 11.6 G/DL (ref 12–17)
HGB BLDA-MCNC: 12.1 G/DL (ref 12–17)
HGB BLDA-MCNC: 12.6 G/DL (ref 12–17)
HGB BLDA-MCNC: 13 G/DL (ref 12–17)
HGB BLDA-MCNC: 13 G/DL (ref 12–17)
HGB BLDA-MCNC: 8.8 G/DL (ref 12–17)
HGB BLDA-MCNC: 9.9 G/DL (ref 12–17)
IMM GRANULOCYTES # BLD AUTO: 0.04 10*3/MM3 (ref 0–0.05)
IMM GRANULOCYTES NFR BLD AUTO: 0.4 % (ref 0–0.5)
INHALED O2 CONCENTRATION: 50 %
INHALED O2 CONCENTRATION: 70 %
INHALED O2 CONCENTRATION: 70 %
INR PPP: 1.09 (ref 0.93–1.1)
INR PPP: 1.3 (ref 0.93–1.1)
LYMPHOCYTES # BLD AUTO: 1.14 10*3/MM3 (ref 0.7–3.1)
LYMPHOCYTES NFR BLD AUTO: 10.8 % (ref 19.6–45.3)
MCH RBC QN AUTO: 31.1 PG (ref 26.6–33)
MCH RBC QN AUTO: 31.7 PG (ref 26.6–33)
MCHC RBC AUTO-ENTMCNC: 32.1 G/DL (ref 31.5–35.7)
MCHC RBC AUTO-ENTMCNC: 32.5 G/DL (ref 31.5–35.7)
MCV RBC AUTO: 96.8 FL (ref 79–97)
MCV RBC AUTO: 97.4 FL (ref 79–97)
MODALITY: ABNORMAL
MONOCYTES # BLD AUTO: 0.79 10*3/MM3 (ref 0.1–0.9)
MONOCYTES NFR BLD AUTO: 7.5 % (ref 5–12)
NEUTROPHILS NFR BLD AUTO: 8.59 10*3/MM3 (ref 1.7–7)
NEUTROPHILS NFR BLD AUTO: 80.9 % (ref 42.7–76)
NRBC BLD AUTO-RTO: 0 /100 WBC (ref 0–0.2)
PCO2 BLDA: 34.7 MM HG (ref 35–48)
PCO2 BLDA: 35.1 MM HG (ref 35–48)
PCO2 BLDA: 41.8 MM HG (ref 35–45)
PCO2 BLDA: 43.3 MM HG (ref 35–45)
PCO2 BLDA: 44.4 MM HG (ref 35–48)
PCO2 BLDA: 45.3 MM HG (ref 35–45)
PCO2 BLDA: 56.5 MM HG (ref 35–45)
PCO2 BLDV: 53.7 MM HG (ref 41–51)
PEEP RESPIRATORY: 8 CM[H2O]
PH BLDA: 7.29 PH UNITS (ref 7.35–7.45)
PH BLDA: 7.34 PH UNITS (ref 7.35–7.45)
PH BLDA: 7.34 PH UNITS (ref 7.35–7.45)
PH BLDA: 7.38 PH UNITS (ref 7.35–7.45)
PH BLDA: 7.38 PH UNITS (ref 7.35–7.45)
PH BLDA: 7.4 PH UNITS (ref 7.35–7.45)
PH BLDA: 7.41 PH UNITS (ref 7.35–7.45)
PH BLDV: 7.28 PH UNITS (ref 7.31–7.41)
PHOSPHATE SERPL-MCNC: 4 MG/DL (ref 2.5–4.5)
PLATELET # BLD AUTO: 111 10*3/MM3 (ref 140–450)
PLATELET # BLD AUTO: 114 10*3/MM3 (ref 140–450)
PMV BLD AUTO: 9.2 FL (ref 6–12)
PMV BLD AUTO: 9.6 FL (ref 6–12)
PO2 BLD: 127 MM[HG] (ref 0–500)
PO2 BLD: 209 MM[HG] (ref 0–500)
PO2 BLD: 216 MM[HG] (ref 0–500)
PO2 BLDA: 107.8 MM HG (ref 83–108)
PO2 BLDA: 146.6 MM HG (ref 83–108)
PO2 BLDA: 263 MM HG (ref 80–105)
PO2 BLDA: 367 MM HG (ref 80–105)
PO2 BLDA: 368 MM HG (ref 80–105)
PO2 BLDA: 429 MM HG (ref 80–105)
PO2 BLDA: 88.6 MM HG (ref 83–108)
PO2 BLDV: 58 MM HG (ref 35–42)
POTASSIUM BLDA-SCNC: 3.7 MMOL/L (ref 3.5–4.5)
POTASSIUM BLDA-SCNC: 3.9 MMOL/L (ref 3.5–4.5)
POTASSIUM BLDA-SCNC: 4 MMOL/L (ref 3.5–4.9)
POTASSIUM BLDA-SCNC: 4.2 MMOL/L (ref 3.5–4.9)
POTASSIUM BLDA-SCNC: 4.3 MMOL/L (ref 3.5–4.5)
POTASSIUM BLDA-SCNC: 4.7 MMOL/L (ref 3.5–4.9)
POTASSIUM SERPL-SCNC: 4.4 MMOL/L (ref 3.5–5.2)
POTASSIUM SERPL-SCNC: 4.6 MMOL/L (ref 3.5–5.2)
PROTHROMBIN TIME: 11.8 SECONDS (ref 9.6–11.7)
PROTHROMBIN TIME: 13.9 SECONDS (ref 9.6–11.7)
QT INTERVAL: 382 MS
QTC INTERVAL: 443 MS
RBC # BLD AUTO: 3.03 10*6/MM3 (ref 3.77–5.28)
RBC # BLD AUTO: 3.73 10*6/MM3 (ref 3.77–5.28)
RESPIRATORY RATE: 14
SAO2 % BLDCOA: 100 % (ref 95–98)
SAO2 % BLDCOA: 96.2 % (ref 94–98)
SAO2 % BLDCOA: 98.3 % (ref 94–98)
SAO2 % BLDCOA: 99.3 % (ref 94–98)
SAO2 % BLDCOV: ABNORMAL %
SODIUM BLD-SCNC: 137 MMOL/L (ref 138–146)
SODIUM BLD-SCNC: 138 MMOL/L (ref 138–146)
SODIUM BLD-SCNC: 138 MMOL/L (ref 138–146)
SODIUM BLD-SCNC: 142 MMOL/L (ref 138–146)
SODIUM BLD-SCNC: 143 MMOL/L (ref 138–146)
SODIUM BLD-SCNC: 145 MMOL/L (ref 138–146)
SODIUM SERPL-SCNC: 140 MMOL/L (ref 136–145)
UNIT  ABO: NORMAL
UNIT  ABO: NORMAL
UNIT  RH: NORMAL
UNIT  RH: NORMAL
VENTILATOR MODE: AC
VT ON VENT VENT: 650 ML
WBC NRBC COR # BLD AUTO: 10.6 10*3/MM3 (ref 3.4–10.8)
WBC NRBC COR # BLD AUTO: 7.98 10*3/MM3 (ref 3.4–10.8)

## 2024-06-06 PROCEDURE — 33518 CABG ARTERY-VEIN TWO: CPT | Performed by: THORACIC SURGERY (CARDIOTHORACIC VASCULAR SURGERY)

## 2024-06-06 PROCEDURE — 85730 THROMBOPLASTIN TIME PARTIAL: CPT | Performed by: THORACIC SURGERY (CARDIOTHORACIC VASCULAR SURGERY)

## 2024-06-06 PROCEDURE — 85576 BLOOD PLATELET AGGREGATION: CPT | Performed by: THORACIC SURGERY (CARDIOTHORACIC VASCULAR SURGERY)

## 2024-06-06 PROCEDURE — 25010000002 HEPARIN (PORCINE) PER 1000 UNITS: Performed by: THORACIC SURGERY (CARDIOTHORACIC VASCULAR SURGERY)

## 2024-06-06 PROCEDURE — 02100Z9 BYPASS CORONARY ARTERY, ONE ARTERY FROM LEFT INTERNAL MAMMARY, OPEN APPROACH: ICD-10-PCS | Performed by: THORACIC SURGERY (CARDIOTHORACIC VASCULAR SURGERY)

## 2024-06-06 PROCEDURE — 25010000002 MAGNESIUM SULFATE PER 500 MG OF MAGNESIUM: Performed by: ANESTHESIOLOGY

## 2024-06-06 PROCEDURE — 94761 N-INVAS EAR/PLS OXIMETRY MLT: CPT

## 2024-06-06 PROCEDURE — 25010000002 POTASSIUM CHLORIDE PER 2 MEQ: Performed by: THORACIC SURGERY (CARDIOTHORACIC VASCULAR SURGERY)

## 2024-06-06 PROCEDURE — C1751 CATH, INF, PER/CENT/MIDLINE: HCPCS | Performed by: ANESTHESIOLOGY

## 2024-06-06 PROCEDURE — C1729 CATH, DRAINAGE: HCPCS | Performed by: THORACIC SURGERY (CARDIOTHORACIC VASCULAR SURGERY)

## 2024-06-06 PROCEDURE — C1751 CATH, INF, PER/CENT/MIDLINE: HCPCS | Performed by: THORACIC SURGERY (CARDIOTHORACIC VASCULAR SURGERY)

## 2024-06-06 PROCEDURE — C1713 ANCHOR/SCREW BN/BN,TIS/BN: HCPCS | Performed by: THORACIC SURGERY (CARDIOTHORACIC VASCULAR SURGERY)

## 2024-06-06 PROCEDURE — 94799 UNLISTED PULMONARY SVC/PX: CPT

## 2024-06-06 PROCEDURE — 85014 HEMATOCRIT: CPT

## 2024-06-06 PROCEDURE — 84295 ASSAY OF SERUM SODIUM: CPT

## 2024-06-06 PROCEDURE — 25010000002 ONDANSETRON PER 1 MG: Performed by: ANESTHESIOLOGY

## 2024-06-06 PROCEDURE — 25010000002 HEPARIN (PORCINE) PER 1000 UNITS: Performed by: ANESTHESIOLOGY

## 2024-06-06 PROCEDURE — 82330 ASSAY OF CALCIUM: CPT

## 2024-06-06 PROCEDURE — 80051 ELECTROLYTE PANEL: CPT

## 2024-06-06 PROCEDURE — 25810000003 SODIUM CHLORIDE 0.9 % SOLUTION: Performed by: NURSE PRACTITIONER

## 2024-06-06 PROCEDURE — 25010000002 MIDAZOLAM PER 1 MG: Performed by: ANESTHESIOLOGY

## 2024-06-06 PROCEDURE — 85610 PROTHROMBIN TIME: CPT | Performed by: THORACIC SURGERY (CARDIOTHORACIC VASCULAR SURGERY)

## 2024-06-06 PROCEDURE — 71045 X-RAY EXAM CHEST 1 VIEW: CPT

## 2024-06-06 PROCEDURE — 25010000002 NITROGLYCERIN 200 MCG/ML SOLUTION: Performed by: ANESTHESIOLOGY

## 2024-06-06 PROCEDURE — 85730 THROMBOPLASTIN TIME PARTIAL: CPT | Performed by: NURSE PRACTITIONER

## 2024-06-06 PROCEDURE — 021109W BYPASS CORONARY ARTERY, TWO ARTERIES FROM AORTA WITH AUTOLOGOUS VENOUS TISSUE, OPEN APPROACH: ICD-10-PCS | Performed by: THORACIC SURGERY (CARDIOTHORACIC VASCULAR SURGERY)

## 2024-06-06 PROCEDURE — 33533 CABG ARTERIAL SINGLE: CPT | Performed by: THORACIC SURGERY (CARDIOTHORACIC VASCULAR SURGERY)

## 2024-06-06 PROCEDURE — 25010000002 FENTANYL CITRATE (PF) 250 MCG/5ML SOLUTION: Performed by: ANESTHESIOLOGY

## 2024-06-06 PROCEDURE — 82803 BLOOD GASES ANY COMBINATION: CPT

## 2024-06-06 PROCEDURE — P9041 ALBUMIN (HUMAN),5%, 50ML: HCPCS | Performed by: NURSE PRACTITIONER

## 2024-06-06 PROCEDURE — 82565 ASSAY OF CREATININE: CPT

## 2024-06-06 PROCEDURE — 82330 ASSAY OF CALCIUM: CPT | Performed by: THORACIC SURGERY (CARDIOTHORACIC VASCULAR SURGERY)

## 2024-06-06 PROCEDURE — 85384 FIBRINOGEN ACTIVITY: CPT | Performed by: THORACIC SURGERY (CARDIOTHORACIC VASCULAR SURGERY)

## 2024-06-06 PROCEDURE — 85025 COMPLETE CBC W/AUTO DIFF WBC: CPT | Performed by: NURSE PRACTITIONER

## 2024-06-06 PROCEDURE — 25010000002 PROTAMINE SULFATE PER 10 MG: Performed by: ANESTHESIOLOGY

## 2024-06-06 PROCEDURE — 25010000002 PHENYLEPHRINE 10 MG/ML SOLUTION: Performed by: ANESTHESIOLOGY

## 2024-06-06 PROCEDURE — 5A1221Z PERFORMANCE OF CARDIAC OUTPUT, CONTINUOUS: ICD-10-PCS | Performed by: THORACIC SURGERY (CARDIOTHORACIC VASCULAR SURGERY)

## 2024-06-06 PROCEDURE — 25010000002 ALBUMIN HUMAN 5% PER 50 ML: Performed by: NURSE PRACTITIONER

## 2024-06-06 PROCEDURE — 25010000002 NICARDIPINE 2.5 MG/ML SOLUTION: Performed by: ANESTHESIOLOGY

## 2024-06-06 PROCEDURE — 25010000002 PROPOFOL 200 MG/20ML EMULSION: Performed by: ANESTHESIOLOGY

## 2024-06-06 PROCEDURE — 82947 ASSAY GLUCOSE BLOOD QUANT: CPT

## 2024-06-06 PROCEDURE — 25010000002 CEFAZOLIN PER 500 MG: Performed by: ANESTHESIOLOGY

## 2024-06-06 PROCEDURE — A4648 IMPLANTABLE TISSUE MARKER: HCPCS | Performed by: THORACIC SURGERY (CARDIOTHORACIC VASCULAR SURGERY)

## 2024-06-06 PROCEDURE — 25010000002 ACETAMINOPHEN 10 MG/ML SOLUTION: Performed by: ANESTHESIOLOGY

## 2024-06-06 PROCEDURE — 93010 ELECTROCARDIOGRAM REPORT: CPT | Performed by: INTERNAL MEDICINE

## 2024-06-06 PROCEDURE — 82948 REAGENT STRIP/BLOOD GLUCOSE: CPT

## 2024-06-06 PROCEDURE — 94002 VENT MGMT INPAT INIT DAY: CPT

## 2024-06-06 PROCEDURE — 82330 ASSAY OF CALCIUM: CPT | Performed by: NURSE PRACTITIONER

## 2024-06-06 PROCEDURE — C1887 CATHETER, GUIDING: HCPCS | Performed by: THORACIC SURGERY (CARDIOTHORACIC VASCULAR SURGERY)

## 2024-06-06 PROCEDURE — 85018 HEMOGLOBIN: CPT

## 2024-06-06 PROCEDURE — 33508 ENDOSCOPIC VEIN HARVEST: CPT | Performed by: THORACIC SURGERY (CARDIOTHORACIC VASCULAR SURGERY)

## 2024-06-06 PROCEDURE — 85027 COMPLETE CBC AUTOMATED: CPT | Performed by: THORACIC SURGERY (CARDIOTHORACIC VASCULAR SURGERY)

## 2024-06-06 PROCEDURE — 06BP4ZZ EXCISION OF RIGHT SAPHENOUS VEIN, PERCUTANEOUS ENDOSCOPIC APPROACH: ICD-10-PCS | Performed by: THORACIC SURGERY (CARDIOTHORACIC VASCULAR SURGERY)

## 2024-06-06 PROCEDURE — 85610 PROTHROMBIN TIME: CPT | Performed by: NURSE PRACTITIONER

## 2024-06-06 PROCEDURE — 84132 ASSAY OF SERUM POTASSIUM: CPT | Performed by: THORACIC SURGERY (CARDIOTHORACIC VASCULAR SURGERY)

## 2024-06-06 PROCEDURE — 84132 ASSAY OF SERUM POTASSIUM: CPT

## 2024-06-06 PROCEDURE — 85347 COAGULATION TIME ACTIVATED: CPT

## 2024-06-06 PROCEDURE — 80069 RENAL FUNCTION PANEL: CPT | Performed by: NURSE PRACTITIONER

## 2024-06-06 PROCEDURE — 25010000002 PAPAVERINE PER 60 MG: Performed by: THORACIC SURGERY (CARDIOTHORACIC VASCULAR SURGERY)

## 2024-06-06 PROCEDURE — 93318 ECHO TRANSESOPHAGEAL INTRAOP: CPT | Performed by: ANESTHESIOLOGY

## 2024-06-06 DEVICE — DEV CONTRL TISS STRATAFIX SPIRAL MNCRYL UD 3/0 PLS 30CM: Type: IMPLANTABLE DEVICE | Site: CHEST | Status: FUNCTIONAL

## 2024-06-06 DEVICE — CLIP LIGAT VASC HORIZON TI SM/WD RED 24CT: Type: IMPLANTABLE DEVICE | Site: CHEST | Status: FUNCTIONAL

## 2024-06-06 DEVICE — CLIP LIGAT VASC HORIZON TI MD BLU 24CT: Type: IMPLANTABLE DEVICE | Site: CHEST | Status: FUNCTIONAL

## 2024-06-06 DEVICE — WAX,BONE,NATURAL
Type: IMPLANTABLE DEVICE | Site: CHEST | Status: FUNCTIONAL
Brand: MEDLINE INDUSTRIES

## 2024-06-06 DEVICE — DEV CONTRL TISS STRATAFIXSPIRALMNCRYL PLSPS2 REV3/0 15CM: Type: IMPLANTABLE DEVICE | Site: LEG | Status: FUNCTIONAL

## 2024-06-06 DEVICE — ABSORBABLE HEMOSTAT (OXIDIZED REGENERATED CELLULOSE)
Type: IMPLANTABLE DEVICE | Site: CHEST | Status: FUNCTIONAL
Brand: SURGICEL

## 2024-06-06 DEVICE — TEMP PACING WIRE
Type: IMPLANTABLE DEVICE | Site: HEART | Status: FUNCTIONAL
Brand: MYO/WIRE

## 2024-06-06 DEVICE — SS SUTURE, 3 PER SLEEVE
Type: IMPLANTABLE DEVICE | Site: STERNUM | Status: FUNCTIONAL
Brand: MYO/WIRE II

## 2024-06-06 DEVICE — SS SUTURE, 6 PER SLEEVE
Type: IMPLANTABLE DEVICE | Site: STERNUM | Status: FUNCTIONAL
Brand: MYO/WIRE II

## 2024-06-06 DEVICE — FLOSEAL HEMOSTATIC MATRIX, 5ML
Type: IMPLANTABLE DEVICE | Site: CHEST | Status: FUNCTIONAL
Brand: FLOSEAL HEMOSTATIC MATRIX

## 2024-06-06 RX ORDER — MORPHINE SULFATE 2 MG/ML
2 INJECTION, SOLUTION INTRAMUSCULAR; INTRAVENOUS
Status: DISPENSED | OUTPATIENT
Start: 2024-06-06 | End: 2024-06-09

## 2024-06-06 RX ORDER — PANTOPRAZOLE SODIUM 40 MG/1
40 TABLET, DELAYED RELEASE ORAL
Status: DISCONTINUED | OUTPATIENT
Start: 2024-06-07 | End: 2024-06-13 | Stop reason: HOSPADM

## 2024-06-06 RX ORDER — CHLORTHALIDONE 25 MG/1
1 TABLET ORAL DAILY
COMMUNITY
Start: 2024-05-28 | End: 2024-06-13 | Stop reason: HOSPADM

## 2024-06-06 RX ORDER — ONDANSETRON 2 MG/ML
4 INJECTION INTRAMUSCULAR; INTRAVENOUS EVERY 6 HOURS PRN
Status: DISCONTINUED | OUTPATIENT
Start: 2024-06-06 | End: 2024-06-13 | Stop reason: HOSPADM

## 2024-06-06 RX ORDER — NITROGLYCERIN 0.4 MG/1
0.4 TABLET SUBLINGUAL
Status: DISCONTINUED | OUTPATIENT
Start: 2024-06-06 | End: 2024-06-13 | Stop reason: HOSPADM

## 2024-06-06 RX ORDER — HYDROCODONE BITARTRATE AND ACETAMINOPHEN 5; 325 MG/1; MG/1
1 TABLET ORAL EVERY 4 HOURS PRN
Status: DISCONTINUED | OUTPATIENT
Start: 2024-06-06 | End: 2024-06-13 | Stop reason: HOSPADM

## 2024-06-06 RX ORDER — UBIDECARENONE 100 MG
1 CAPSULE ORAL DAILY
COMMUNITY

## 2024-06-06 RX ORDER — HEPARIN SODIUM 1000 [USP'U]/ML
INJECTION, SOLUTION INTRAVENOUS; SUBCUTANEOUS AS NEEDED
Status: DISCONTINUED | OUTPATIENT
Start: 2024-06-06 | End: 2024-06-06 | Stop reason: SURG

## 2024-06-06 RX ORDER — ASPIRIN 81 MG/1
81 TABLET ORAL DAILY
Status: DISCONTINUED | OUTPATIENT
Start: 2024-06-07 | End: 2024-06-13 | Stop reason: HOSPADM

## 2024-06-06 RX ORDER — MIDAZOLAM HYDROCHLORIDE 1 MG/ML
INJECTION INTRAMUSCULAR; INTRAVENOUS AS NEEDED
Status: DISCONTINUED | OUTPATIENT
Start: 2024-06-06 | End: 2024-06-06 | Stop reason: SURG

## 2024-06-06 RX ORDER — MAGNESIUM HYDROXIDE 1200 MG/15ML
LIQUID ORAL AS NEEDED
Status: DISCONTINUED | OUTPATIENT
Start: 2024-06-06 | End: 2024-06-06 | Stop reason: HOSPADM

## 2024-06-06 RX ORDER — ISOSORBIDE MONONITRATE 30 MG/1
30 TABLET, EXTENDED RELEASE ORAL DAILY
COMMUNITY
Start: 2024-06-04 | End: 2024-06-13 | Stop reason: HOSPADM

## 2024-06-06 RX ORDER — ONDANSETRON 2 MG/ML
INJECTION INTRAMUSCULAR; INTRAVENOUS AS NEEDED
Status: DISCONTINUED | OUTPATIENT
Start: 2024-06-06 | End: 2024-06-06 | Stop reason: SURG

## 2024-06-06 RX ORDER — PHENYLEPHRINE HYDROCHLORIDE 10 MG/ML
INJECTION INTRAVENOUS AS NEEDED
Status: DISCONTINUED | OUTPATIENT
Start: 2024-06-06 | End: 2024-06-06 | Stop reason: SURG

## 2024-06-06 RX ORDER — ACETAMINOPHEN 650 MG/1
650 SUPPOSITORY RECTAL EVERY 4 HOURS PRN
Status: DISCONTINUED | OUTPATIENT
Start: 2024-06-07 | End: 2024-06-13 | Stop reason: HOSPADM

## 2024-06-06 RX ORDER — POTASSIUM CHLORIDE 29.8 MG/ML
20 INJECTION INTRAVENOUS ONCE
Status: COMPLETED | OUTPATIENT
Start: 2024-06-06 | End: 2024-06-06

## 2024-06-06 RX ORDER — ACETAMINOPHEN 10 MG/ML
INJECTION, SOLUTION INTRAVENOUS AS NEEDED
Status: DISCONTINUED | OUTPATIENT
Start: 2024-06-06 | End: 2024-06-06 | Stop reason: SURG

## 2024-06-06 RX ORDER — NITROGLYCERIN 20 MG/100ML
INJECTION INTRAVENOUS CONTINUOUS PRN
Status: DISCONTINUED | OUTPATIENT
Start: 2024-06-06 | End: 2024-06-06 | Stop reason: SURG

## 2024-06-06 RX ORDER — FENTANYL CITRATE 50 UG/ML
INJECTION, SOLUTION INTRAMUSCULAR; INTRAVENOUS AS NEEDED
Status: DISCONTINUED | OUTPATIENT
Start: 2024-06-06 | End: 2024-06-06 | Stop reason: SURG

## 2024-06-06 RX ORDER — ENOXAPARIN SODIUM 100 MG/ML
40 INJECTION SUBCUTANEOUS EVERY 24 HOURS
Status: DISCONTINUED | OUTPATIENT
Start: 2024-06-07 | End: 2024-06-07

## 2024-06-06 RX ORDER — OMEGA-3-ACID ETHYL ESTERS 1 G/1
1 CAPSULE, LIQUID FILLED ORAL
COMMUNITY
Start: 2024-05-07

## 2024-06-06 RX ORDER — POLYETHYLENE GLYCOL 3350 17 G/17G
17 POWDER, FOR SOLUTION ORAL 2 TIMES DAILY
Status: DISCONTINUED | OUTPATIENT
Start: 2024-06-06 | End: 2024-06-08

## 2024-06-06 RX ORDER — AMINOCAPROIC ACID 250 MG/ML
INJECTION, SOLUTION INTRAVENOUS AS NEEDED
Status: DISCONTINUED | OUTPATIENT
Start: 2024-06-06 | End: 2024-06-06 | Stop reason: SURG

## 2024-06-06 RX ORDER — DEXMEDETOMIDINE HYDROCHLORIDE 4 UG/ML
.2-1.5 INJECTION, SOLUTION INTRAVENOUS
Status: DISCONTINUED | OUTPATIENT
Start: 2024-06-06 | End: 2024-06-07

## 2024-06-06 RX ORDER — MAGNESIUM SULFATE HEPTAHYDRATE 500 MG/ML
INJECTION, SOLUTION INTRAMUSCULAR; INTRAVENOUS AS NEEDED
Status: DISCONTINUED | OUTPATIENT
Start: 2024-06-06 | End: 2024-06-06 | Stop reason: SURG

## 2024-06-06 RX ORDER — PROPOFOL 10 MG/ML
INJECTION, EMULSION INTRAVENOUS AS NEEDED
Status: DISCONTINUED | OUTPATIENT
Start: 2024-06-06 | End: 2024-06-06 | Stop reason: SURG

## 2024-06-06 RX ORDER — NALOXONE HCL 0.4 MG/ML
0.4 VIAL (ML) INJECTION
Status: DISCONTINUED | OUTPATIENT
Start: 2024-06-06 | End: 2024-06-13 | Stop reason: HOSPADM

## 2024-06-06 RX ORDER — NOREPINEPHRINE BITARTRATE 0.03 MG/ML
.02-.06 INJECTION, SOLUTION INTRAVENOUS CONTINUOUS PRN
Status: DISCONTINUED | OUTPATIENT
Start: 2024-06-06 | End: 2024-06-07

## 2024-06-06 RX ORDER — MAGNESIUM SULFATE 1 G/100ML
1 INJECTION INTRAVENOUS EVERY 8 HOURS
Qty: 300 ML | Refills: 0 | Status: COMPLETED | OUTPATIENT
Start: 2024-06-07 | End: 2024-06-07

## 2024-06-06 RX ORDER — NOREPINEPHRINE BITARTRATE 0.03 MG/ML
INJECTION, SOLUTION INTRAVENOUS CONTINUOUS PRN
Status: DISCONTINUED | OUTPATIENT
Start: 2024-06-06 | End: 2024-06-06 | Stop reason: SURG

## 2024-06-06 RX ORDER — VITAMIN E 268 MG
400 CAPSULE ORAL DAILY
COMMUNITY

## 2024-06-06 RX ORDER — MEPERIDINE HYDROCHLORIDE 25 MG/ML
25 INJECTION INTRAMUSCULAR; INTRAVENOUS; SUBCUTANEOUS ONCE AS NEEDED
Status: DISCONTINUED | OUTPATIENT
Start: 2024-06-06 | End: 2024-06-07

## 2024-06-06 RX ORDER — SODIUM CHLORIDE 9 MG/ML
30 INJECTION, SOLUTION INTRAVENOUS CONTINUOUS
Status: DISPENSED | OUTPATIENT
Start: 2024-06-06 | End: 2024-06-09

## 2024-06-06 RX ORDER — CHLORHEXIDINE GLUCONATE ORAL RINSE 1.2 MG/ML
15 SOLUTION DENTAL EVERY 12 HOURS
Status: DISCONTINUED | OUTPATIENT
Start: 2024-06-06 | End: 2024-06-10

## 2024-06-06 RX ORDER — LOSARTAN POTASSIUM 100 MG/1
1 TABLET ORAL EVERY MORNING
COMMUNITY
Start: 2024-05-07 | End: 2024-06-13 | Stop reason: HOSPADM

## 2024-06-06 RX ORDER — NICOTINE POLACRILEX 4 MG
15 LOZENGE BUCCAL
Status: DISCONTINUED | OUTPATIENT
Start: 2024-06-06 | End: 2024-06-08

## 2024-06-06 RX ORDER — ALBUMIN, HUMAN INJ 5% 5 %
12.5 SOLUTION INTRAVENOUS AS NEEDED
Status: DISCONTINUED | OUTPATIENT
Start: 2024-06-06 | End: 2024-06-07

## 2024-06-06 RX ORDER — DEXTROSE MONOHYDRATE 25 G/50ML
10-50 INJECTION, SOLUTION INTRAVENOUS
Status: DISCONTINUED | OUTPATIENT
Start: 2024-06-06 | End: 2024-06-08

## 2024-06-06 RX ORDER — DIMENHYDRINATE 50 MG
1 TABLET ORAL DAILY
COMMUNITY

## 2024-06-06 RX ORDER — ACETAMINOPHEN 10 MG/ML
1000 INJECTION, SOLUTION INTRAVENOUS EVERY 8 HOURS
Qty: 200 ML | Refills: 0 | Status: COMPLETED | OUTPATIENT
Start: 2024-06-07 | End: 2024-06-07

## 2024-06-06 RX ORDER — IBUPROFEN 600 MG/1
1 TABLET ORAL
Status: DISCONTINUED | OUTPATIENT
Start: 2024-06-06 | End: 2024-06-08

## 2024-06-06 RX ORDER — ACETAMINOPHEN 160 MG/5ML
650 SOLUTION ORAL EVERY 4 HOURS PRN
Status: DISCONTINUED | OUTPATIENT
Start: 2024-06-07 | End: 2024-06-13 | Stop reason: HOSPADM

## 2024-06-06 RX ORDER — ASPIRIN 325 MG
325 TABLET ORAL ONCE
Qty: 1 TABLET | Refills: 0 | Status: COMPLETED | OUTPATIENT
Start: 2024-06-06 | End: 2024-06-06

## 2024-06-06 RX ORDER — NITROGLYCERIN 20 MG/100ML
5-50 INJECTION INTRAVENOUS CONTINUOUS PRN
Status: DISCONTINUED | OUTPATIENT
Start: 2024-06-06 | End: 2024-06-07

## 2024-06-06 RX ORDER — LOVASTATIN 20 MG/1
20 TABLET ORAL DAILY
COMMUNITY
Start: 2024-01-29

## 2024-06-06 RX ORDER — ACETAMINOPHEN 325 MG/1
650 TABLET ORAL EVERY 4 HOURS PRN
Status: DISCONTINUED | OUTPATIENT
Start: 2024-06-07 | End: 2024-06-13 | Stop reason: HOSPADM

## 2024-06-06 RX ORDER — NICARDIPINE HYDROCHLORIDE 2.5 MG/ML
INJECTION INTRAVENOUS AS NEEDED
Status: DISCONTINUED | OUTPATIENT
Start: 2024-06-06 | End: 2024-06-06 | Stop reason: SURG

## 2024-06-06 RX ORDER — AMOXICILLIN 250 MG
2 CAPSULE ORAL 2 TIMES DAILY
Status: DISCONTINUED | OUTPATIENT
Start: 2024-06-06 | End: 2024-06-08

## 2024-06-06 RX ORDER — VECURONIUM BROMIDE 1 MG/ML
INJECTION, POWDER, LYOPHILIZED, FOR SOLUTION INTRAVENOUS AS NEEDED
Status: DISCONTINUED | OUTPATIENT
Start: 2024-06-06 | End: 2024-06-06 | Stop reason: SURG

## 2024-06-06 RX ORDER — KETAMINE HCL IN NACL, ISO-OSM 100MG/10ML
SYRINGE (ML) INJECTION AS NEEDED
Status: DISCONTINUED | OUTPATIENT
Start: 2024-06-06 | End: 2024-06-06 | Stop reason: SURG

## 2024-06-06 RX ORDER — ASPIRIN 81 MG/1
81 TABLET ORAL DAILY
COMMUNITY

## 2024-06-06 RX ORDER — PANTOPRAZOLE SODIUM 40 MG/10ML
40 INJECTION, POWDER, LYOPHILIZED, FOR SOLUTION INTRAVENOUS ONCE
Qty: 10 ML | Refills: 0 | Status: COMPLETED | OUTPATIENT
Start: 2024-06-06 | End: 2024-06-06

## 2024-06-06 RX ADMIN — ONDANSETRON 4 MG: 2 INJECTION INTRAMUSCULAR; INTRAVENOUS at 15:40

## 2024-06-06 RX ADMIN — NICARDIPINE HYDROCHLORIDE 0.5 MCG: 2.5 INJECTION, SOLUTION INTRAVENOUS at 12:58

## 2024-06-06 RX ADMIN — HEPARIN SODIUM 30000 UNITS: 1000 INJECTION INTRAVENOUS; SUBCUTANEOUS at 14:10

## 2024-06-06 RX ADMIN — NICARDIPINE HYDROCHLORIDE 5 MG/HR: 2.5 INJECTION, SOLUTION INTRAVENOUS at 15:43

## 2024-06-06 RX ADMIN — NICARDIPINE HYDROCHLORIDE 0.5 MCG: 2.5 INJECTION, SOLUTION INTRAVENOUS at 13:36

## 2024-06-06 RX ADMIN — METOPROLOL TARTRATE 25 MG: 25 TABLET, FILM COATED ORAL at 09:54

## 2024-06-06 RX ADMIN — Medication 25 MG: at 12:32

## 2024-06-06 RX ADMIN — NICARDIPINE HYDROCHLORIDE 0.25 MG: 2.5 INJECTION, SOLUTION INTRAVENOUS at 15:41

## 2024-06-06 RX ADMIN — PHENYLEPHRINE HYDROCHLORIDE 100 MCG: 10 INJECTION INTRAVENOUS at 13:04

## 2024-06-06 RX ADMIN — PROTAMINE SULFATE 250 MG: 10 INJECTION, SOLUTION INTRAVENOUS at 15:24

## 2024-06-06 RX ADMIN — VECURONIUM BROMIDE 4 MG: 10 INJECTION, POWDER, LYOPHILIZED, FOR SOLUTION INTRAVENOUS at 14:02

## 2024-06-06 RX ADMIN — Medication 0.02 MCG/KG/MIN: at 12:39

## 2024-06-06 RX ADMIN — CEFAZOLIN 2 G: 2 INJECTION, POWDER, LYOPHILIZED, FOR SOLUTION INTRAVENOUS at 15:40

## 2024-06-06 RX ADMIN — CEFAZOLIN 2 G: 2 INJECTION, POWDER, LYOPHILIZED, FOR SOLUTION INTRAVENOUS at 13:00

## 2024-06-06 RX ADMIN — SODIUM CHLORIDE 30 ML/HR: 9 INJECTION, SOLUTION INTRAVENOUS at 18:02

## 2024-06-06 RX ADMIN — Medication 25 MG: at 12:37

## 2024-06-06 RX ADMIN — POTASSIUM CHLORIDE 20 MEQ: 400 INJECTION, SOLUTION INTRAVENOUS at 20:56

## 2024-06-06 RX ADMIN — Medication 10 ML: at 09:54

## 2024-06-06 RX ADMIN — PHENYLEPHRINE HYDROCHLORIDE 100 MCG: 10 INJECTION INTRAVENOUS at 13:27

## 2024-06-06 RX ADMIN — CHLORHEXIDINE GLUCONATE, 0.12% ORAL RINSE 15 ML: 1.2 SOLUTION DENTAL at 20:56

## 2024-06-06 RX ADMIN — PANTOPRAZOLE SODIUM 40 MG: 40 INJECTION, POWDER, FOR SOLUTION INTRAVENOUS at 18:39

## 2024-06-06 RX ADMIN — MUPIROCIN 1 APPLICATION: 20 OINTMENT TOPICAL at 20:56

## 2024-06-06 RX ADMIN — ASPIRIN 81 MG: 81 TABLET, COATED ORAL at 09:54

## 2024-06-06 RX ADMIN — VECURONIUM BROMIDE 4 MG: 10 INJECTION, POWDER, LYOPHILIZED, FOR SOLUTION INTRAVENOUS at 15:15

## 2024-06-06 RX ADMIN — PHENYLEPHRINE HYDROCHLORIDE 100 MCG: 10 INJECTION INTRAVENOUS at 13:20

## 2024-06-06 RX ADMIN — POTASSIUM CHLORIDE 40 MEQ: 1500 TABLET, EXTENDED RELEASE ORAL at 05:18

## 2024-06-06 RX ADMIN — ALBUMIN (HUMAN) 12.5 G: 12.5 INJECTION, SOLUTION INTRAVENOUS at 18:04

## 2024-06-06 RX ADMIN — NICARDIPINE HYDROCHLORIDE 0.25 MCG: 2.5 INJECTION, SOLUTION INTRAVENOUS at 14:07

## 2024-06-06 RX ADMIN — AMINOCAPROIC ACID 10 G: 250 INJECTION, SOLUTION INTRAVENOUS at 15:40

## 2024-06-06 RX ADMIN — DEXMEDETOMIDINE HYDROCHLORIDE 0.5 MCG/KG/HR: 4 INJECTION, SOLUTION INTRAVENOUS at 18:40

## 2024-06-06 RX ADMIN — VECURONIUM BROMIDE 6 MG: 10 INJECTION, POWDER, LYOPHILIZED, FOR SOLUTION INTRAVENOUS at 14:49

## 2024-06-06 RX ADMIN — NITROGLYCERIN 20 MCG/MIN: 20 INJECTION INTRAVENOUS at 15:22

## 2024-06-06 RX ADMIN — ASPIRIN 325 MG ORAL TABLET 325 MG: 325 PILL ORAL at 18:40

## 2024-06-06 RX ADMIN — DEXMEDETOMIDINE HYDROCHLORIDE 0.5 MCG/KG/HR: 100 INJECTION, SOLUTION INTRAVENOUS at 12:39

## 2024-06-06 RX ADMIN — PROPOFOL 40 MG: 10 INJECTION, EMULSION INTRAVENOUS at 12:39

## 2024-06-06 RX ADMIN — MIDAZOLAM 5 MG: 1 INJECTION INTRAMUSCULAR; INTRAVENOUS at 12:32

## 2024-06-06 RX ADMIN — AMINOCAPROIC ACID 10 G: 250 INJECTION, SOLUTION INTRAVENOUS at 12:39

## 2024-06-06 RX ADMIN — MAGNESIUM SULFATE HEPTAHYDRATE 2 G: 500 INJECTION, SOLUTION INTRAMUSCULAR; INTRAVENOUS at 15:17

## 2024-06-06 RX ADMIN — VECURONIUM BROMIDE 14 MG: 10 INJECTION, POWDER, LYOPHILIZED, FOR SOLUTION INTRAVENOUS at 12:39

## 2024-06-06 RX ADMIN — ACETAMINOPHEN 1000 MG: 10 INJECTION, SOLUTION INTRAVENOUS at 15:40

## 2024-06-06 RX ADMIN — FENTANYL CITRATE 1500 MCG: 50 INJECTION, SOLUTION INTRAMUSCULAR; INTRAVENOUS at 12:40

## 2024-06-06 RX ADMIN — PHENYLEPHRINE HYDROCHLORIDE 100 MCG: 10 INJECTION INTRAVENOUS at 12:55

## 2024-06-06 RX ADMIN — POTASSIUM CHLORIDE 40 MEQ: 1500 TABLET, EXTENDED RELEASE ORAL at 00:28

## 2024-06-06 RX ADMIN — SODIUM CHLORIDE 125 ML/HR: 9 INJECTION, SOLUTION INTRAVENOUS at 11:49

## 2024-06-06 NOTE — CASE MANAGEMENT/SOCIAL WORK
Discharge Planning Assessment   Humberto     Patient Name: Jeannie Ruiz  MRN: [id number]  Today's Date: 6/6/2024    Admit Date: 6/5/2024    Plan: DC Plan: Pending clinical course and PT/OT evals. From home with family. CABG 6/6/2024   Discharge Needs Assessment       Row Name 06/06/24 1550       Living Environment    People in Home spouse    Name(s) of People in Home Momo Ruiz    Current Living Arrangements home    Potentially Unsafe Housing Conditions none    In the past 12 months has the electric, gas, oil, or water company threatened to shut off services in your home? No    Primary Care Provided by self    Provides Primary Care For spouse    Family Caregiver if Needed child(rupert), adult    Family Caregiver Names Son Babatunde and Daughter Rowena    Quality of Family Relationships helpful;involved;supportive    Able to Return to Prior Arrangements yes       Resource/Environmental Concerns    Resource/Environmental Concerns none    Transportation Concerns none       Transportation Needs    In the past 12 months, has lack of transportation kept you from medical appointments or from getting medications? no    In the past 12 months, has lack of transportation kept you from meetings, work, or from getting things needed for daily living? No       Food Insecurity    Within the past 12 months, you worried that your food would run out before you got the money to buy more. Never true    Within the past 12 months, the food you bought just didn't last and you didn't have money to get more. Never true       Transition Planning    Patient/Family Anticipates Transition to home with family    Patient/Family Anticipated Services at Transition none    Transportation Anticipated car, drives self;family or friend will provide       Discharge Needs Assessment    Readmission Within the Last 30 Days no previous admission in last 30 days    Equipment Currently Used at Home shower chair;walker, rolling    Concerns to be Addressed discharge  planning    Anticipated Changes Related to Illness none    Equipment Needed After Discharge other (see comments)  CM will continue to monitor for needs    Provided Post Acute Provider List? N/A    Provided Post Acute Provider Quality & Resource List? N/A                   Discharge Plan       Row Name 06/06/24 5903       Plan    Plan DC Plan: Pending clinical course and PT/OT evals. From home with family. CABG 6/6/2024    Patient/Family in Agreement with Plan unable to assess    Provided Post Acute Provider List? N/A    Provided Post Acute Provider Quality & Resource List? N/A    Plan Comments CM spoke with patient at bedside to discuss admission assessment and discharge planning. Patient confirms PCP and pharmacy. Patient confirms she is agreeable to enrolling in meds to bed program. CM enrolled patient and updated pharmacy in [facility]. Patient denies any difficulty affording medications at this time. Patient denies any additional needs for services or DME at this time. Patient reports she is IADL's at baseline and Drives. Patient spouse has had a CVA, but children will help care for her and him. Family will provide transportation when ready for DC.CM will continue to follow for any further needs and adjust discharge plan accordingly. DC Barriers: POD 0 OHS.                 Expected Discharge Date and Time       Expected Discharge Date Expected Discharge Time    Jun 11, 2024            Demographic Summary       Row Name 06/06/24 1547       General Information    Admission Type inpatient    Arrived From home;operating room    Required Notices Provided Important Message from Medicare    Referral Source admission list    Reason for Consult discharge planning    Preferred Language English       Contact Information    Permission Granted to Share Info With                    Functional Status       Row Name 06/06/24 1549       Functional Status    Usual Activity Tolerance excellent    Current Activity Tolerance  excellent       Physical Activity    On average, how many days per week do you engage in moderate to strenuous exercise (like a brisk walk)? 0 days    On average, how many minutes do you engage in exercise at this level? 0 min    Number of minutes of exercise per week 0       Functional Status, IADL    Medications independent    Meal Preparation independent    Housekeeping independent    Laundry independent    Shopping independent       Mental Status    General Appearance WDL WDL       Mental Status Summary    Recent Changes in Mental Status/Cognitive Functioning no changes       Employment/    Employment Status retired    Current or Previous Occupation not applicable           Current or Previous  Service none               Met with patient in room      Maintain distance greater than six feet and spent less than fifteen minutes in the room.      Amanda Eduardo RN      Office Phone: [phone]  Office Cell:     [phone]

## 2024-06-06 NOTE — ANESTHESIA PROCEDURE NOTES
Arterial Line      Patient reassessed immediately prior to procedure    Patient location during procedure: OR  Start time: 6/6/2024 12:35 PM  Stop Time:6/6/2024 12:40 PM       Line placed for hemodynamic monitoring.  Performed By   Anesthesiologist: David Guzman MD   Preanesthetic Checklist  Completed: patient identified, IV checked, site marked, risks and benefits discussed, surgical consent, monitors and equipment checked, pre-op evaluation and timeout performed  Arterial Line Prep    Sterile Tech: gloves  Prep: ChloraPrep  Patient monitoring: continuous pulse oximetry, EKG and blood pressure monitoring  Arterial Line Procedure   Laterality:left  Location:  radial artery  Catheter size: 20 G   Guidance: ultrasound guided  Number of attempts: 1  Successful placement: yes   Post Assessment   Dressing Type: occlusive dressing applied, secured with tape and wrist guard applied.   Complications no  Circ/Move/Sens Assessment: normal.   Patient Tolerance: patient tolerated the procedure well with no apparent complications

## 2024-06-06 NOTE — ANESTHESIA PROCEDURE NOTES
Intra-Op Anesthesia CHARLEE    Procedure Performed: Intra-Op Anesthesia CHARLEE       Start Time:        End Time:      Preanesthesia Checklist:  Patient identified, IV assessed, risks and benefits discussed, monitors and equipment assessed, procedure being performed at surgeon's request and anesthesia consent obtained.    General Procedure Information  CHARLEE Placed for monitoring purposes only -- This is not a diagnostic CHARLEE  Diagnostic Indications for Echo:  hemodynamic monitoring  Location performed:  OR  Intubated  Bite block placed  Heart visualized  Probe Insertion:  Easy  Probe Type:  Biplane  Modalities:  Color flow mapping, continuous wave Doppler and 2D only    Echocardiographic and Doppler Measurements    Ventricles    Right Ventricle:  Cavity size normal.  Global function normal.    Left Ventricle:  Cavity size normal.  Global Function normal.  Ejection Fraction 55%.          Valves    Aortic Valve:  Annulus normal.  Regurgitation absent.  Leaflet motions normal.      Mitral Valve:  Annulus normal.  Regurgitation mild.  Leaflets normal.  Leaflet motions normal.      Tricuspid Valve:  Annulus normal.  Regurgitation absent.  Leaflet motions normal.        Aorta    Ascending Aorta:  Size normal.  Mobile plaque not present.    Aortic Arch:  Size normal.  Mobile plaque not present.    Descending Aorta:  Size normal.  Mobile plaque not present.        Atria    Right Atrium:  Size normal.    Left Atrium:  Size normal.                  Anesthesia Information  Performed Personally  Anesthesiologist:  David Guzman MD      Echocardiogram Comments:       Pre CPB:         Mild -Mod MR, Central jet, EF 55%  Post CPB:   no changes

## 2024-06-06 NOTE — ANESTHESIA POSTPROCEDURE EVALUATION
Patient: Jeannie Ruiz    Procedure Summary       Date: 06/06/24 Room / Location: [facility] CVOR 01 / [facility]    Anesthesia Start: 1227 Anesthesia Stop: 1656    Procedure: CORONARY ARTERY BYPASS WITH INTERNAL MAMMARY ARTERY GRAFT with intraop CHARLEE (Chest) Diagnosis:       Coronary artery disease involving native coronary artery of native heart with unstable angina pectoris      (Coronary artery disease involving native coronary artery of native heart with unstable angina pectoris [I25.110])    Surgeons: Bienvenido Hall MD Provider: David Guzman MD    Anesthesia Type: general, Stephanie, CVL, PAC ASA Status: 4            Anesthesia Type: general, Stephanie, CVL, PAC    Vitals  Vitals Value Taken Time   BP     Temp 96.08 °F (35.6 °C) 06/06/24 1656   Pulse 80 06/06/24 1656   Resp     SpO2 94 % 06/06/24 1656   Vitals shown include unfiled device data.        Post Anesthesia Care and Evaluation    Patient location during evaluation: ICU  Patient participation: complete - patient cannot participate  Level of consciousness: obtunded/minimal responses  Pain scale: See nurse's notes for pain score.  Pain management: adequate    Airway patency: patent  Anesthetic complications: No anesthetic complications  PONV Status: none  Cardiovascular status: acceptable  Respiratory status: acceptable, ventilator and ETT  Hydration status: acceptable    Comments: Patient seen and examined postoperatively; vital signs stable; SpO2 greater than or equal to 90%; cardiopulmonary status stable; nausea/vomiting adequately controlled; pain adequately controlled; no apparent anesthesia complications; patient discharged from anesthesia care when discharge criteria were met

## 2024-06-06 NOTE — ANESTHESIA PROCEDURE NOTES
Airway  Urgency: elective    Date/Time: 6/6/2024 12:37 PM  Airway not difficult    General Information and Staff    Patient location during procedure: OR  Anesthesiologist: David Guzman MD    Indications and Patient Condition  Indications for airway management: airway protection    Preoxygenated: yes  Mask difficulty assessment: 1 - vent by mask    Final Airway Details  Final airway type: endotracheal airway      Successful airway: ETT  Cuffed: yes   Successful intubation technique: video laryngoscopy  Facilitating devices/methods: intubating stylet  Endotracheal tube insertion site: oral  Blade: Asim  Blade size: 3  ETT size (mm): 7.5  Cormack-Lehane Classification: grade I - full view of glottis  Placement verified by: chest auscultation, capnometry and palpation of cuff   Measured from: lips  ETT/EBT  to lips (cm): 24  Number of attempts at approach: 1  Assessment: lips, teeth, and gum same as pre-op and atraumatic intubation

## 2024-06-06 NOTE — ANESTHESIA PROCEDURE NOTES
Central Line      Central Line Procedure  Catheter Type:[place]-Viola  Additional Notes  [place] Placed at time of Cordis

## 2024-06-06 NOTE — ANESTHESIA PROCEDURE NOTES
Central Line      Patient reassessed immediately prior to procedure    Patient location during procedure: OR  Start time: 6/6/2024 12:57 PM  Stop Time:6/6/2024 1:10 PM  Indications: central pressure monitoring, vascular access and MD/Surgeon request  Staff  Anesthesiologist: David Guzman MD  Preanesthetic Checklist  Completed: patient identified, IV checked, site marked, risks and benefits discussed, surgical consent, monitors and equipment checked, pre-op evaluation and timeout performed  Central Line Prep  Sterile Tech:cap, gloves, gown, mask and sterile barriers  Prep: chloraprep  Patient monitoring: blood pressure monitoring, continuous pulse oximetry and EKG  Central Line Procedure  Laterality:right  Location:internal jugular  Catheter Type:Cordis and double lumen  Catheter Size:9 Fr  Guidance:ultrasound guided  PROCEDURE NOTE/ULTRASOUND INTERPRETATION.  Using ultrasound guidance the potential vascular sites for insertion of the catheter were visualized to determine the patency of the vessel to be used for vascular access.  After selecting the appropriate site for insertion, the needle was visualized under ultrasound being inserted into the internal jugular vein, followed by ultrasound confirmation of wire and catheter placement. There were no abnormalities seen on ultrasound; an image was taken; and the patient tolerated the procedure with no complications. Images: still images obtained, printed/placed on chart  Assessment  Post procedure:biopatch applied, line sutured and occlusive dressing applied  Assessement:blood return through all ports, free fluid flow and Israel Test  Complications:no  Patient Tolerance:patient tolerated the procedure well with no apparent complications

## 2024-06-07 ENCOUNTER — APPOINTMENT (OUTPATIENT)
Dept: GENERAL RADIOLOGY | Facility: HOSPITAL | Age: 74
End: 2024-06-07
Payer: MEDICARE

## 2024-06-07 LAB
ALBUMIN SERPL-MCNC: 3.9 G/DL (ref 3.5–5.2)
ANION GAP SERPL CALCULATED.3IONS-SCNC: 9.8 MMOL/L (ref 5–15)
ARTERIAL PATENCY WRIST A: ABNORMAL
ATMOSPHERIC PRESS: ABNORMAL MM[HG]
BASE EXCESS BLDA CALC-SCNC: -0.9 MMOL/L (ref 0–3)
BASE EXCESS BLDA CALC-SCNC: -2.9 MMOL/L (ref 0–3)
BASE EXCESS BLDA CALC-SCNC: -3.2 MMOL/L (ref 0–3)
BASOPHILS # BLD AUTO: 0.02 10*3/MM3 (ref 0–0.2)
BASOPHILS NFR BLD AUTO: 0.2 % (ref 0–1.5)
BDY SITE: ABNORMAL
BUN SERPL-MCNC: 19 MG/DL (ref 8–23)
BUN/CREAT SERPL: 19 (ref 7–25)
CA-I BLDA-SCNC: 1.19 MMOL/L (ref 1.15–1.33)
CA-I SERPL ISE-MCNC: 1.18 MMOL/L (ref 1.2–1.3)
CALCIUM SPEC-SCNC: 8.6 MG/DL (ref 8.6–10.5)
CHLORIDE SERPL-SCNC: 110 MMOL/L (ref 98–107)
CO2 BLDA-SCNC: 24.7 MMOL/L (ref 22–29)
CO2 BLDA-SCNC: 26.1 MMOL/L (ref 22–29)
CO2 SERPL-SCNC: 22.2 MMOL/L (ref 22–29)
CREAT SERPL-MCNC: 1 MG/DL (ref 0.57–1)
DEPRECATED RDW RBC AUTO: 49.5 FL (ref 37–54)
EGFRCR SERPLBLD CKD-EPI 2021: 58.9 ML/MIN/1.73
EOSINOPHIL # BLD AUTO: 0 10*3/MM3 (ref 0–0.4)
EOSINOPHIL NFR BLD AUTO: 0 % (ref 0.3–6.2)
ERYTHROCYTE [DISTWIDTH] IN BLOOD BY AUTOMATED COUNT: 13.7 % (ref 12.3–15.4)
GLUCOSE BLDC GLUCOMTR-MCNC: 124 MG/DL (ref 70–105)
GLUCOSE BLDC GLUCOMTR-MCNC: 125 MG/DL (ref 70–105)
GLUCOSE BLDC GLUCOMTR-MCNC: 128 MG/DL (ref 70–105)
GLUCOSE BLDC GLUCOMTR-MCNC: 133 MG/DL (ref 70–105)
GLUCOSE BLDC GLUCOMTR-MCNC: 135 MG/DL (ref 70–105)
GLUCOSE BLDC GLUCOMTR-MCNC: 140 MG/DL (ref 70–105)
GLUCOSE BLDC GLUCOMTR-MCNC: 144 MG/DL (ref 70–105)
GLUCOSE BLDC GLUCOMTR-MCNC: 153 MG/DL (ref 70–105)
GLUCOSE BLDC GLUCOMTR-MCNC: 161 MG/DL (ref 70–105)
GLUCOSE BLDC GLUCOMTR-MCNC: 164 MG/DL (ref 70–105)
GLUCOSE BLDC GLUCOMTR-MCNC: 166 MG/DL (ref 70–105)
GLUCOSE SERPL-MCNC: 171 MG/DL (ref 65–99)
HCO3 BLDA-SCNC: 22.3 MMOL/L (ref 21–28)
HCO3 MIXED: 23.2 MMOL/L (ref 21–29)
HCO3 MIXED: 24.7 MMOL/L (ref 21–29)
HCT VFR BLD AUTO: 33.2 % (ref 34–46.6)
HCT VFR BLDA CALC: 32 % (ref 38–51)
HCT VFR BLDA CALC: 35 % (ref 38–51)
HEMODILUTION: NO
HEMODILUTION: YES
HEMODILUTION: YES
HGB BLD-MCNC: 10.7 G/DL (ref 12–15.9)
HGB BLDA-MCNC: 10.8 G/DL (ref 12–17)
HGB BLDA-MCNC: 11.9 G/DL (ref 12–17)
IMM GRANULOCYTES # BLD AUTO: 0.05 10*3/MM3 (ref 0–0.05)
IMM GRANULOCYTES NFR BLD AUTO: 0.5 % (ref 0–0.5)
INHALED O2 CONCENTRATION: 36 %
INHALED O2 CONCENTRATION: 40 %
INR PPP: 1.04 (ref 0.93–1.1)
LYMPHOCYTES # BLD AUTO: 0.63 10*3/MM3 (ref 0.7–3.1)
LYMPHOCYTES NFR BLD AUTO: 6.9 % (ref 19.6–45.3)
MAGNESIUM SERPL-MCNC: 2.6 MG/DL (ref 1.6–2.4)
MCH RBC QN AUTO: 31.4 PG (ref 26.6–33)
MCHC RBC AUTO-ENTMCNC: 32.2 G/DL (ref 31.5–35.7)
MCV RBC AUTO: 97.4 FL (ref 79–97)
MODALITY: ABNORMAL
MONOCYTES # BLD AUTO: 0.59 10*3/MM3 (ref 0.1–0.9)
MONOCYTES NFR BLD AUTO: 6.5 % (ref 5–12)
NEUTROPHILS NFR BLD AUTO: 7.81 10*3/MM3 (ref 1.7–7)
NEUTROPHILS NFR BLD AUTO: 85.9 % (ref 42.7–76)
NRBC BLD AUTO-RTO: 0 /100 WBC (ref 0–0.2)
O2 SATURATION MIXED: 62.9 %
O2 SATURATION MIXED: 66.3 %
PCO2 BLDA: 39.4 MM HG (ref 35–48)
PCO2 MIXED: 43.8 MMHG (ref 35–51)
PCO2 MIXED: 46.7 MMHG (ref 35–51)
PEEP RESPIRATORY: 5 CM[H2O]
PH BLDA: 7.36 PH UNITS (ref 7.35–7.45)
PH MIXED: 7.3 PH UNITS (ref 7.32–7.45)
PH MIXED: 7.36 PH UNITS (ref 7.32–7.45)
PHOSPHATE SERPL-MCNC: 5.3 MG/DL (ref 2.5–4.5)
PLATELET # BLD AUTO: 120 10*3/MM3 (ref 140–450)
PMV BLD AUTO: 9.4 FL (ref 6–12)
PO2 BLD: 218 MM[HG] (ref 0–500)
PO2 BLDA: 87.3 MM HG (ref 83–108)
PO2 MIXED: 36 MMHG
PO2 MIXED: 36.1 MMHG
POTASSIUM BLDA-SCNC: 4 MMOL/L (ref 3.5–4.5)
POTASSIUM SERPL-SCNC: 4.1 MMOL/L (ref 3.5–5.2)
PROTHROMBIN TIME: 11.3 SECONDS (ref 9.6–11.7)
PSV: 10 CMH2O
RBC # BLD AUTO: 3.41 10*6/MM3 (ref 3.77–5.28)
SAO2 % BLDCOA: 96.3 % (ref 94–98)
SODIUM BLD-SCNC: 144 MMOL/L (ref 138–146)
SODIUM SERPL-SCNC: 142 MMOL/L (ref 136–145)
VENTILATOR MODE: ABNORMAL
WBC NRBC COR # BLD AUTO: 9.1 10*3/MM3 (ref 3.4–10.8)

## 2024-06-07 PROCEDURE — 25010000002 ONDANSETRON PER 1 MG: Performed by: NURSE PRACTITIONER

## 2024-06-07 PROCEDURE — 83735 ASSAY OF MAGNESIUM: CPT | Performed by: NURSE PRACTITIONER

## 2024-06-07 PROCEDURE — 93005 ELECTROCARDIOGRAM TRACING: CPT | Performed by: NURSE PRACTITIONER

## 2024-06-07 PROCEDURE — 97161 PT EVAL LOW COMPLEX 20 MIN: CPT

## 2024-06-07 PROCEDURE — 99024 POSTOP FOLLOW-UP VISIT: CPT | Performed by: NURSE PRACTITIONER

## 2024-06-07 PROCEDURE — 80069 RENAL FUNCTION PANEL: CPT | Performed by: NURSE PRACTITIONER

## 2024-06-07 PROCEDURE — 71045 X-RAY EXAM CHEST 1 VIEW: CPT

## 2024-06-07 PROCEDURE — 85610 PROTHROMBIN TIME: CPT | Performed by: NURSE PRACTITIONER

## 2024-06-07 PROCEDURE — 25010000002 MORPHINE PER 10 MG: Performed by: NURSE PRACTITIONER

## 2024-06-07 PROCEDURE — 82948 REAGENT STRIP/BLOOD GLUCOSE: CPT

## 2024-06-07 PROCEDURE — 82803 BLOOD GASES ANY COMBINATION: CPT

## 2024-06-07 PROCEDURE — 85025 COMPLETE CBC W/AUTO DIFF WBC: CPT | Performed by: NURSE PRACTITIONER

## 2024-06-07 PROCEDURE — 25010000002 CEFAZOLIN PER 500 MG: Performed by: NURSE PRACTITIONER

## 2024-06-07 PROCEDURE — 25010000002 MAGNESIUM SULFATE IN D5W 1G/100ML (PREMIX) 1-5 GM/100ML-% SOLUTION: Performed by: NURSE PRACTITIONER

## 2024-06-07 PROCEDURE — 93010 ELECTROCARDIOGRAM REPORT: CPT | Performed by: INTERNAL MEDICINE

## 2024-06-07 PROCEDURE — 85018 HEMOGLOBIN: CPT

## 2024-06-07 PROCEDURE — 82330 ASSAY OF CALCIUM: CPT | Performed by: NURSE PRACTITIONER

## 2024-06-07 PROCEDURE — 25010000002 ENOXAPARIN PER 10 MG: Performed by: NURSE PRACTITIONER

## 2024-06-07 PROCEDURE — 25010000002 FUROSEMIDE PER 20 MG: Performed by: NURSE PRACTITIONER

## 2024-06-07 PROCEDURE — 25010000002 CALCIUM GLUCONATE 2-0.675 GM/100ML-% SOLUTION: Performed by: NURSE PRACTITIONER

## 2024-06-07 PROCEDURE — 97165 OT EVAL LOW COMPLEX 30 MIN: CPT

## 2024-06-07 PROCEDURE — 25010000002 ACETAMINOPHEN 10 MG/ML SOLUTION: Performed by: NURSE PRACTITIONER

## 2024-06-07 PROCEDURE — 25010000002 METOCLOPRAMIDE PER 10 MG: Performed by: NURSE PRACTITIONER

## 2024-06-07 RX ORDER — CALCIUM GLUCONATE 20 MG/ML
2000 INJECTION, SOLUTION INTRAVENOUS ONCE
Status: COMPLETED | OUTPATIENT
Start: 2024-06-07 | End: 2024-06-07

## 2024-06-07 RX ORDER — ENOXAPARIN SODIUM 100 MG/ML
40 INJECTION SUBCUTANEOUS EVERY 12 HOURS
Status: DISCONTINUED | OUTPATIENT
Start: 2024-06-08 | End: 2024-06-13 | Stop reason: HOSPADM

## 2024-06-07 RX ORDER — FUROSEMIDE 10 MG/ML
20 INJECTION INTRAMUSCULAR; INTRAVENOUS ONCE
Status: COMPLETED | OUTPATIENT
Start: 2024-06-07 | End: 2024-06-07

## 2024-06-07 RX ORDER — CYCLOBENZAPRINE HCL 10 MG
5 TABLET ORAL 3 TIMES DAILY PRN
Status: DISCONTINUED | OUTPATIENT
Start: 2024-06-07 | End: 2024-06-13 | Stop reason: HOSPADM

## 2024-06-07 RX ORDER — ENOXAPARIN SODIUM 100 MG/ML
40 INJECTION SUBCUTANEOUS EVERY 24 HOURS
Status: COMPLETED | OUTPATIENT
Start: 2024-06-07 | End: 2024-06-07

## 2024-06-07 RX ORDER — METOCLOPRAMIDE HYDROCHLORIDE 5 MG/ML
10 INJECTION INTRAMUSCULAR; INTRAVENOUS EVERY 6 HOURS
Status: COMPLETED | OUTPATIENT
Start: 2024-06-07 | End: 2024-06-08

## 2024-06-07 RX ORDER — ONDANSETRON 2 MG/ML
4 INJECTION INTRAMUSCULAR; INTRAVENOUS ONCE
Status: COMPLETED | OUTPATIENT
Start: 2024-06-07 | End: 2024-06-07

## 2024-06-07 RX ADMIN — HYDROCODONE BITARTRATE AND ACETAMINOPHEN 1 TABLET: 5; 325 TABLET ORAL at 04:35

## 2024-06-07 RX ADMIN — MORPHINE SULFATE 2 MG: 2 INJECTION, SOLUTION INTRAMUSCULAR; INTRAVENOUS at 04:35

## 2024-06-07 RX ADMIN — MUPIROCIN 1 APPLICATION: 20 OINTMENT TOPICAL at 08:58

## 2024-06-07 RX ADMIN — METOCLOPRAMIDE 10 MG: 5 INJECTION, SOLUTION INTRAMUSCULAR; INTRAVENOUS at 11:23

## 2024-06-07 RX ADMIN — MAGNESIUM SULFATE IN DEXTROSE 1 G: 10 INJECTION, SOLUTION INTRAVENOUS at 15:23

## 2024-06-07 RX ADMIN — SENNOSIDES AND DOCUSATE SODIUM 2 TABLET: 50; 8.6 TABLET ORAL at 08:58

## 2024-06-07 RX ADMIN — ENOXAPARIN SODIUM 40 MG: 100 INJECTION SUBCUTANEOUS at 15:22

## 2024-06-07 RX ADMIN — MUPIROCIN 1 APPLICATION: 20 OINTMENT TOPICAL at 20:44

## 2024-06-07 RX ADMIN — MORPHINE SULFATE 2 MG: 2 INJECTION, SOLUTION INTRAMUSCULAR; INTRAVENOUS at 00:32

## 2024-06-07 RX ADMIN — POLYETHYLENE GLYCOL 3350 17 G: 17 POWDER, FOR SOLUTION ORAL at 20:44

## 2024-06-07 RX ADMIN — ACETAMINOPHEN 1000 MG: 10 INJECTION INTRAVENOUS at 08:59

## 2024-06-07 RX ADMIN — POLYETHYLENE GLYCOL 3350 17 G: 17 POWDER, FOR SOLUTION ORAL at 08:58

## 2024-06-07 RX ADMIN — MAGNESIUM SULFATE IN DEXTROSE 1 G: 10 INJECTION, SOLUTION INTRAVENOUS at 08:59

## 2024-06-07 RX ADMIN — ONDANSETRON 4 MG: 2 INJECTION INTRAMUSCULAR; INTRAVENOUS at 11:11

## 2024-06-07 RX ADMIN — MORPHINE SULFATE 2 MG: 2 INJECTION, SOLUTION INTRAMUSCULAR; INTRAVENOUS at 11:23

## 2024-06-07 RX ADMIN — ACETAMINOPHEN 1000 MG: 10 INJECTION INTRAVENOUS at 00:32

## 2024-06-07 RX ADMIN — SENNOSIDES AND DOCUSATE SODIUM 2 TABLET: 50; 8.6 TABLET ORAL at 20:44

## 2024-06-07 RX ADMIN — HYDROCODONE BITARTRATE AND ACETAMINOPHEN 1 TABLET: 5; 325 TABLET ORAL at 08:58

## 2024-06-07 RX ADMIN — PANTOPRAZOLE SODIUM 40 MG: 40 TABLET, DELAYED RELEASE ORAL at 06:41

## 2024-06-07 RX ADMIN — CYCLOBENZAPRINE 5 MG: 10 TABLET, FILM COATED ORAL at 18:08

## 2024-06-07 RX ADMIN — CHLORHEXIDINE GLUCONATE, 0.12% ORAL RINSE 15 ML: 1.2 SOLUTION DENTAL at 08:58

## 2024-06-07 RX ADMIN — CALCIUM GLUCONATE 2000 MG: 20 INJECTION, SOLUTION INTRAVENOUS at 11:24

## 2024-06-07 RX ADMIN — HYDROCODONE BITARTRATE AND ACETAMINOPHEN 1 TABLET: 5; 325 TABLET ORAL at 00:32

## 2024-06-07 RX ADMIN — CHLORHEXIDINE GLUCONATE, 0.12% ORAL RINSE 15 ML: 1.2 SOLUTION DENTAL at 20:45

## 2024-06-07 RX ADMIN — SODIUM CHLORIDE 2 G: 900 INJECTION INTRAVENOUS at 08:58

## 2024-06-07 RX ADMIN — SODIUM CHLORIDE 2 G: 900 INJECTION INTRAVENOUS at 00:30

## 2024-06-07 RX ADMIN — ASPIRIN 81 MG: 81 TABLET, COATED ORAL at 08:58

## 2024-06-07 RX ADMIN — METOCLOPRAMIDE 10 MG: 5 INJECTION, SOLUTION INTRAMUSCULAR; INTRAVENOUS at 18:08

## 2024-06-07 RX ADMIN — MAGNESIUM SULFATE IN DEXTROSE 1 G: 10 INJECTION, SOLUTION INTRAVENOUS at 00:32

## 2024-06-07 RX ADMIN — FUROSEMIDE 20 MG: 10 INJECTION, SOLUTION INTRAMUSCULAR; INTRAVENOUS at 15:23

## 2024-06-07 RX ADMIN — ONDANSETRON 4 MG: 2 INJECTION INTRAMUSCULAR; INTRAVENOUS at 06:25

## 2024-06-07 RX ADMIN — MORPHINE SULFATE 2 MG: 2 INJECTION, SOLUTION INTRAMUSCULAR; INTRAVENOUS at 15:38

## 2024-06-07 NOTE — PLAN OF CARE
Goal Outcome Evaluation:  Plan of Care Reviewed With: patient        Progress: no change  Outcome Evaluation: Pt is a 74 y.o. female with CAD and NSTEMI. S/p CABG on 6/6/24. Pt is independent at baseline and assists with the care of her disabled . Pt's son also lives with them and is able/willing to assist. Pt. completes sit to stand transfer this date w/ min A for safety, increased nausea w/ functional mobility. Pt. ambulates w/ min A [facility] short distances, limited secondary to fatigue. Pt. provided max A for all LB ADLs. Anticipate safe for d/c home w/ family support assist, will follow up w/ pt. 1-3x per week at [facility] to assess progress. Recommend HH therapy to evaluate home safety.      Anticipated Discharge Disposition (OT): home with assist, home with home health

## 2024-06-07 NOTE — OP NOTE
Operative Note    Date of Dictation: 06/07/24    Date of Procedure: 06/06/2024    Referring Physician: Yemi Thompson MD    Preoperative diagnosis:   1.  Multivessel coronary artery disease  2.  Non-STEMI  3.  Chest pain  4.  Obesity with BMI of 39.9 kg/m2    Postoperative diagnosis:   Same    Procedure:   1.  Urgent CABG x 3 with a LIMA to the dual LAD and reverse saphenous vein graft to the PDA and a high marginal  2. EVH of the right leg    Surgeon: Bienvenido Hall MD     Assistants: Assistant: Riya Noriega CSA was responsible for performing the following activities: Retraction, Suction, Irrigation, Suturing, Closing, Placing Dressing, Harvesting of Vessels, Bypass Grafting, and all aspects of the complex cardiac case  and their skilled assistance was necessary for the success of this case.     Anesthesia: General endotracheal anesthesia and CHARLEE    Findings:  The saphenous vein was harvested endoscopically form the right  leg. The vein had a diameter of 4 mm and was of fair quality. The coronaries had an between 1 and 1.5 mm.    Estimated Blood Loss: Approximately 400 cc, most of it recovered with a Cell Saver device and cardiotomy suckers and was retransfused to the patient    STS Data:    The patient was explained the risks (STS risk score calculated at 1.6%), benefits and alternatives of surgery and agreed to proceed. The antibiotics and b blockers were given in the STS required window.  Counseling was given about diet, alcohol and tobacco use as needed        Description of the procedure:     The patient was placed supine on the operative table. General anesthesia was given and lines placed. The patient was prepped and draped using the usual sterile technique. A median sternotomy was performed with a scalpel and the layers carried down to the sternum using the electrocautery. The sternum was split in the midline using a vertical oscillating saw. Hemostasis was achieved. The LIMA was harvested as a pedicle  and prepared with papaverine. It was of good quality. 300 units/kg of IV heparin was given to an ACT over 400. A Favaloro retractor was placed and the mediastinum exposed, the pericardium was opened and the edges tacked to the wound. Cannulation sutures were placed in the ascending aorta and right atrium. Small cannulas were placed and aorto right atrial cardiopulmonary bypass was started. Cardioplegia cannulas were placed and then the ascending aorta was clamped. One liter of cold blood cardioplegia was given in an antegrade fashion to achieved diastolic arrest and further doses every 15 minutes thereafter. Constructions of grafts were done in the sequence distal - proximal between the reversed saphenous vein graft and each coronary targets. Grafts were constructed to the PDA and OM1 coronary arteries and plegia was given between graft sequences after de-airing the aortic root and tying the proximal anastomosis. The LIMA was anastomosed to the mid LAD using 7.0 prolene continuous suture with a small needle.  Of note, there was a medial branch of the dual LAD which was intramyocardial most likely with a similar course of the first septal but more superficial and it was small to bypass distally and deeply intramyocardial proximally.  I dissected proximally around the muscle with difficulty to find it and a small entering the right ventricle requiring suture closure and it was my judgment that further dissection will result in the further ventriculotomy.  Mid to distal artery was very small and not bypassable.  Then the warm dose of cardioplegia was given and then the aortic clamp removed as well as the LIMA bulldog clamp. All anastomoses were checked and had good flow and morphology. The left pleural space was suctioned and the lungs ventilated. The heart was paced till regular atrial rhythm resumed. I allowed the heart to eject and once hemodynamics were acceptable, then the CPB was discontinued and the venous and  cardioplegia cannulas removed. The matching dose of protamine was given and the aortic cannula removed as well. AV temporary wires and pleural and mediastinal chest tubes were placed and the wound sprayed with platelet rich plasma.  The sternum was closed with single and double wires and soft tissue in layers of reabsorbable material. The wounds were covered with sterile dressings.       Specimen removed: None    CPB time: 62 minutes    Aortic clamp time: 54 minutes    Complications:  none           Disposition: Cardiovascular recovery room           Condition: Critical but stable.

## 2024-06-07 NOTE — PROGRESS NOTES
S/P POD# 1 urgent CABG x3 with LIMA--Pagni  EF 50% (echo)    Subjective:  reports surgical discomfort    Extubated ~ 12 Midnight  Drips:  insulin, just off norepi  CI 2, CVP 7, SVR 1238  Wt is up 2 kgs from preop  UF -500 cc  CXR:  atel, small left effusion  MVO2 sat:  62.9      Intake/Output Summary (Last 24 hours) at 6/7/2024 0814  Last data filed at 6/7/2024 0700  Gross per 24 hour   Intake 50 ml   Output 2275 ml   Net -2225 ml     Temp:  [95.4 °F (35.2 °C)-97.7 °F (36.5 °C)] 97.2 °F (36.2 °C)  Heart Rate:  [74-82] 80  Resp:  [14-21] 21  BP: (104-154)/(47-71) 104/47  FiO2 (%):  [40 %-70 %] 40 %  /8    Results from last 7 days   Lab Units 06/07/24  0500 06/07/24  0217 06/06/24  1708 06/06/24  1655 06/06/24  1533 06/06/24  1531   WBC 10*3/mm3  --  9.10  --  10.60  --  7.98   HEMOGLOBIN g/dL  --  10.7*  --  11.6*  --  9.6*   HEMOGLOBIN, POC g/dL 10.8*  --    < >  --    < >  --    HEMATOCRIT %  --  33.2*  --  36.1  --  29.5*   HEMATOCRIT POC % 32*  --    < >  --    < >  --    PLATELETS 10*3/mm3  --  120*  --  114*  --  111*   INR   --  1.04  --  1.09  --  1.30*    < > = values in this interval not displayed.     Results from last 7 days   Lab Units 06/07/24  0217   CREATININE mg/dL 1.00   POTASSIUM mmol/L 4.1   SODIUM mmol/L 142   MAGNESIUM mg/dL 2.6*   PHOSPHORUS mg/dL 5.3*     ica 1.19    Physical Exam:  Neuro intact, nad, up in recliner, no family seen  Tele:  AAi 80, underlying SR 60-70s  Diminished bases, 98% 4L  dsg c/d/i, no drainage  Benign abd, no BM  No edema    Assessment/Plan:  Principal Problem:    CAD (coronary artery disease)  Active Problems:    Non-STEMI (non-ST elevated myocardial infarction)    HTN (hypertension)    HLD (hyperlipidemia)    Type 2 diabetes mellitus, without long-term current use of insulin    - MV CAD including left main disease, EF 50% (echo)--s/p urgent CABG x 3 with LIMA to the dual LAD, SVG to PDA, SVG to a high marginal (Pagni)  - HTN--just off norepi  - HLD, statin  intolerant--not taking Repatha d/t cost  - DM type 2--A1c 6.74  - CKD stage 3--stable  - Obesity, stage 3--BMI 39.9  - Postop ABLA, expected--watch closely  - Postop TCP, consumptive--watch closely    POD# 1.  Doing well.  DC [place]/ a line.  On norepi earlier this morning.  On asa.  No statin due to intolerance.  Holding bb since just off pressor.  Add Flexeril.  Mobilize.  Re-eval chest tubes/ raymundo later today.  Have DM educator see pt.    Addendum:  Dry heaves while working with therapy this morning.  Add Reglan.    Routine care--as above  De-escal  D/w pt/nsg, Dr. Bullard  Anticipate home      Madeline Jung, ARLIN  6/7/2024  08:14 EDT

## 2024-06-07 NOTE — THERAPY EVALUATION
Patient Name: Jeannie Ruiz  : 1950    MRN: [id number]                              Today's Date: 2024       Admit Date: 2024    Visit Dx:     ICD-10-CM ICD-9-CM   1. Coronary artery disease involving native coronary artery of native heart with unstable angina pectoris  I25.110 414.01     411.1     Patient Active Problem List   Diagnosis    CAD (coronary artery disease)    Non-STEMI (non-ST elevated myocardial infarction)    HTN (hypertension)    HLD (hyperlipidemia)    Type 2 diabetes mellitus, without long-term current use of insulin     Past Medical History:   Diagnosis Date    Coronary artery disease     Diabetes mellitus     Elevated cholesterol     Hematuria     chronic microscopic    Hypertension      Past Surgical History:   Procedure Laterality Date    APPENDECTOMY      BREAST SURGERY      COLONOSCOPY      SKIN BIOPSY        General Information       [facility] Name 24 1050          Physical Therapy Time and Intention    Document Type evaluation  -     Mode of Treatment physical therapy  -[facility] Name 24 1050          General Information    Patient Profile Reviewed yes  -     Prior Level of Function independent:;all household mobility;gait;community mobility;transfer;driving;shopping  -     Existing Precautions/Restrictions sternal;fall  -     Barriers to Rehab medically complex  -[facility] Name 24 1050          Living Environment    People in Home spouse;child(rupert), adult  -[facility] Name 24 1050          Home Main Entrance    Number of Stairs, Main Entrance two  -     Stair Railings, Main Entrance railings safe and in good condition  -[facility] Name 24 1050          Cognition    Orientation Status (Cognition) oriented x 3  -[facility] Name 24 1311          Safety Issues, Functional Mobility    Impairments Affecting Function (Mobility) endurance/activity tolerance;strength;pain  -               User Key  (r) = Recorded By, (t) = Taken  By, (c) = Cosigned By      Initials Name Provider Type     Amelia Lay, PT Physical Therapist                   Mobility       Row Name 06/07/24 1312          Bed Mobility    Bed Mobility bed mobility (all) activities  -     All Activities, [place] (Bed Mobility) maximum assist (25% patient effort);2 person assist  -     Comment, (Bed Mobility) sit to supine and scooting up in bed.  -       Row Name 06/07/24 1312          Sit-Stand Transfer    Sit-Stand [place] (Transfers) contact guard;1 person to manage equipment  -     Assistive Device (Sit-Stand Transfers) walker, front-wheeled  -       Row Name 06/07/24 1312          Gait/Stairs (Locomotion)    [place] Level (Gait) contact guard  -     Assistive Device (Gait) walker, front-wheeled  -     Patient was able to Ambulate yes  -     Distance in Feet (Gait) 18  -     Deviations/Abnormal Patterns (Gait) stride length decreased;gait speed decreased  -               User Key  (r) = Recorded By, (t) = Taken By, (c) = Cosigned By      Initials Name Provider Type     Amelia Lay, PT Physical Therapist                   Obj/Interventions       Row Name 06/07/24 1313          Range of Motion Comprehensive    General Range of Motion no range of motion deficits identified  -[facility] Name 06/07/24 1313          Strength Comprehensive (MMT)    General Manual Muscle Testing (MMT) Assessment no strength deficits identified  -AH       Row Name 06/07/24 1313          Balance    Balance Assessment sitting static balance;sitting dynamic balance;standing static balance;standing dynamic balance  -     Static Sitting Balance supervision  -     Dynamic Sitting Balance supervision  -     Position, Sitting Balance sitting in chair;unsupported  -     Static Standing Balance contact guard  -     Dynamic Standing Balance contact guard  -     Position/Device Used, Standing Balance supported;walker, front-wheeled  -       Row Name  06/07/24 1313          Sensory Assessment (Somatosensory)    Sensory Assessment (Somatosensory) sensation intact  -               User Key  (r) = Recorded By, (t) = Taken By, (c) = Cosigned By      Initials Name Provider Type     Amelia Lay, PT Physical Therapist                   Goals/Plan       Row Name 06/07/24 1319          Bed Mobility Goal 1 (PT)    Activity/Assistive Device (Bed Mobility Goal 1, PT) bed mobility activities, all  -     [place] Level/Cues Needed (Bed Mobility Goal 1, PT) contact guard required  -     Time Frame (Bed Mobility Goal 1, PT) long term goal (LTG);2 weeks  -AH       Row Name 06/07/24 1319          Transfer Goal 1 (PT)    Activity/Assistive Device (Transfer Goal 1, PT) transfers, all  -     [place] Level/Cues Needed (Transfer Goal 1, PT) supervision required  -     Time Frame (Transfer Goal 1, PT) long term goal (LTG);2 weeks  -AH       Row Name 06/07/24 1319          Gait Training Goal 1 (PT)    Activity/Assistive Device (Gait Training Goal 1, PT) gait (walking locomotion);assistive device use;walker, rolling  -     [place] Level (Gait Training Goal 1, PT) standby assist  -     Distance (Gait Training Goal 1, PT) 250'  -     Time Frame (Gait Training Goal 1, PT) long term goal (LTG);2 weeks  -AH       Row Name 06/07/24 1319          Problem Specific Goal 1 (PT)    Problem Specific Goal 1 (PT) pt will verbalize and demo sternal precautions during activities, with no cues needed.  -     Time Frame (Problem Specific Goal 1, PT) long-term goal (LTG);2 weeks  -AH       Row Name 06/07/24 1319          Therapy Assessment/Plan (PT)    Planned Therapy Interventions (PT) balance training;bed mobility training;gait training;home exercise program;patient/family education;strengthening;transfer training;stair training  -               User Key  (r) = Recorded By, (t) = Taken By, (c) = Cosigned By      Initials Name Provider Type    Amelia Mo, PT  Physical Therapist                   Clinical Impression       [facility] Name 06/07/24 1314          Pain    Additional Documentation Pain Scale: FACES Pre/Post-Treatment (Group)  -[facility] Name 06/07/24 1314          Pain Scale: FACES Pre/Post-Treatment    Pain: FACES Scale, Pretreatment 2-->hurts little bit  -     Posttreatment Pain Rating 8-->hurts whole lot  -     Pre/Posttreatment Pain Comment sternal pain increased significantly during/after sit to supine tranfser.  -[facility] Name 06/07/24 1314          Plan of Care Review    Plan of Care Reviewed With patient  -     Outcome Evaluation Pt is a 74 y.o. female with CAD and NSTEMI. S/p CABG on 6/6/24. Pt is independent at baseline and assists with the care of her disabled . Pt's son also lives with them and is able/willing to assist. Pt presents POD 1 with impaired endurance and activity tolerance, limited mostly by pain and nausea. She completes mobility with CGA/Eleno for sit to stand and ambulation, maxA x2 for bed mobility. pt presented with orthostatic BP in standing, but with MAP >70 and asymptomatic; she was able to remains standing and progress to ambulation. Therapist provided initial education for sternal precautions and heart hugger use; reminders needed throughout session. Pt is motivated and participates well. PT anticipates she will progress quickly and be safe to DC home with family assist. RW may be needed pending progress.  -[facility] Name 06/07/24 1319          Therapy Assessment/Plan (PT)    Rehab Potential (PT) good, to achieve stated therapy goals  -     Criteria for Skilled Interventions Met (PT) yes;meets criteria  -     Therapy Frequency (PT) 5 times/wk  -[facility] Name 06/07/24 131          Positioning and Restraints    Post Treatment Position bed  -     In Bed supine;call light within reach;encouraged to call for assist;with nsg;with family/caregiver  -               User Key  (r) = Recorded By, (t) = Taken  By, (c) = Cosigned By      Initials Name Provider Type     Amelia Lay, PT Physical Therapist                   Outcome Measures       Row Name 06/07/24 1320          How much help from another person do you currently need...    Turning from your back to your side while in flat bed without using bedrails? 2  -AH     Moving from lying on back to sitting on the side of a flat bed without bedrails? 2  -AH     Moving to and from a bed to a chair (including a wheelchair)? 3  -AH     Standing up from a chair using your arms (e.g., wheelchair, bedside chair)? 4  -AH     Climbing 3-5 steps with a railing? 2  -AH     To walk in hospital room? 3  -AH     AM-PAC 6 Clicks Score (PT) 16  -     Highest Level of Mobility Goal 5 --> Static standing  -               User Key  (r) = Recorded By, (t) = Taken By, (c) = Cosigned By      Initials Name Provider Type     Amelia Lay, PT Physical Therapist                                 Physical Therapy Education       Title: PT OT SLP Therapies (Done)       Topic: Physical Therapy (Done)       Point: Mobility training (Done)       Learning Progress Summary             Patient Eager, E, NR,VU by  at 6/7/2024 1321                         Point: Home exercise program (Done)       Learning Progress Summary             Patient Eager, E, NR,VU by  at 6/7/2024 1321                         Point: Body mechanics (Done)       Learning Progress Summary             Patient Eager, E, NR,VU by  at 6/7/2024 1321                         Point: Precautions (Done)       Learning Progress Summary             Patient Eager, E, NR,VU by  at 6/7/2024 1321                                         User Key       Initials Effective Dates Name Provider Type [facility] 12/04/23 -  Amelia Lay, PT Physical Therapist PT                  PT Recommendation and Plan  Planned Therapy Interventions (PT): balance training, bed mobility training, gait training, home exercise program,  patient/family education, strengthening, transfer training, stair training  Plan of Care Reviewed With: patient  Outcome Evaluation: Pt is a 74 y.o. female with CAD and NSTEMI. S/p CABG on 6/6/24. Pt is independent at baseline and assists with the care of her disabled . Pt's son also lives with them and is able/willing to assist. Pt presents POD 1 with impaired endurance and activity tolerance, limited mostly by pain and nausea. She completes mobility with CGA/Eleno for sit to stand and ambulation, maxA x2 for bed mobility. pt presented with orthostatic BP in standing, but with MAP >70 and asymptomatic; she was able to remains standing and progress to ambulation. Therapist provided initial education for sternal precautions and heart hugger use; reminders needed throughout session. Pt is motivated and participates well. PT anticipates she will progress quickly and be safe to DC home with family assist. RW may be needed pending progress.     Time Calculation:         PT Charges       Row Name 06/07/24 1321             Time Calculation    Start Time 1054  -      Stop Time 1122  -      Time Calculation (min) 28 min  -      PT Non-Billable Time (min) 0 min  -      PT Received On 06/07/24  -      PT - Next Appointment 06/08/24  -      PT Goal Re-Cert Due Date 06/21/24  -         Time Calculation- PT    Total Timed Code Minutes- PT 0 minute(s)  -                User Key  (r) = Recorded By, (t) = Taken By, (c) = Cosigned By      Initials Name Provider Type     Amelia Lay, PT Physical Therapist                  Therapy Charges for Today       Code Description Service Date Service Provider Modifiers Qty    92635714244 HC PT EVAL LOW COMPLEXITY 4 6/7/2024 Amelia Lay, PT GP 1            PT G-Codes  AM-PAC 6 Clicks Score (PT): 16  PT Discharge Summary  Anticipated Discharge Disposition (PT): home with assist    Amelia Lay PT  6/7/2024

## 2024-06-07 NOTE — CASE MANAGEMENT/SOCIAL WORK
Continued Stay Note  OLIVIA Humberto     Patient Name: Jeannie Ruiz  MRN: [id number]  Today's Date: 6/7/2024    Admit Date: 6/5/2024    Plan: DC Plan: Anticipate routine home with family. CABG 6/6/2024   Discharge Plan       Row Name 06/07/24 1441       Plan    Plan DC Plan: Anticipate routine home with family. CABG 6/6/2024    Patient/Family in Agreement with Plan yes    Provided Post Acute Provider List? N/A    Provided Post Acute Provider Quality & Resource List? N/A    Plan Comments CM spoke with patient’s nurse and [facility] NP Madeline Grover to obtain clinical updates. PT recommending home with assist at this time. CM will continue to follow for any further needs and adjust discharge plan accordingly. DC Barriers: POD 1 OHS, cardiac monitoring, O2@4L nc, central line, pacer wires, arterial line, chest tubes x2, Insulin gtt, IV abx/electrolytes, and monitor labs.                 Expected Discharge Date and Time       Expected Discharge Date Expected Discharge Time    Jun 11, 2024           Phone communication or documentation only- no physical contact with patient or family.      Amanda Eduardo RN     Office Phone: [phone]  Office Cell:     [phone]

## 2024-06-07 NOTE — THERAPY EVALUATION
Patient Name: Jeannie Ruiz  : 1950    MRN: [id number]                              Today's Date: 2024       Admit Date: 2024    Visit Dx:     ICD-10-CM ICD-9-CM   1. Coronary artery disease involving native coronary artery of native heart with unstable angina pectoris  I25.110 414.01     411.1     Patient Active Problem List   Diagnosis    CAD (coronary artery disease)    Non-STEMI (non-ST elevated myocardial infarction)    HTN (hypertension)    HLD (hyperlipidemia)    Type 2 diabetes mellitus, without long-term current use of insulin     Past Medical History:   Diagnosis Date    Coronary artery disease     Diabetes mellitus     Elevated cholesterol     Hematuria     chronic microscopic    Hypertension      Past Surgical History:   Procedure Laterality Date    APPENDECTOMY      BREAST SURGERY      COLONOSCOPY      SKIN BIOPSY        General Information       Row Name 24 1632          OT Time and Intention    Document Type evaluation  -MP     Mode of Treatment occupational therapy  -MP       Row Name 24 1632          General Information    Patient Profile Reviewed yes  -MP     Existing Precautions/Restrictions sternal;fall  -MP       Row Name 24 1632          Living Environment    People in Home spouse;child(rupert), adult  -MP       Row Name 24 1632          Cognition    Orientation Status (Cognition) oriented x 3  -MP       Row Name 24 1632          Safety Issues, Functional Mobility    Impairments Affecting Function (Mobility) endurance/activity tolerance;strength;pain  -MP               User Key  (r) = Recorded By, (t) = Taken By, (c) = Cosigned By      Initials Name Provider Type    MP Negro Foster OT Occupational Therapist                     Mobility/ADL's       Row Name 24 1635          Bed Mobility    Bed Mobility bed mobility (all) activities  -MP     All Activities, [place] (Bed Mobility) maximum assist (25% patient effort);2 person assist  -MP        Row Name 06/07/24 1635          Sit-Stand Transfer    Sit-Stand [place] (Transfers) contact guard;1 person to manage equipment  -     Assistive Device (Sit-Stand Transfers) walker, front-wheeled  -[facility] Name 06/07/24 1635          Functional Mobility    Functional Mobility- Ind. Level minimum assist (75% patient effort)  -[facility] Name 06/07/24 1635          Activities of Daily Living    BADL Assessment/Intervention lower body dressing  -[facility] Name 06/07/24 1635          Lower Body Dressing Assessment/Training    [place] Level (Lower Body Dressing) lower body dressing skills;maximum assist (25% patient effort)  -MP               User Key  (r) = Recorded By, (t) = Taken By, (c) = Cosigned By      Initials Name Provider Type    Negro Ortez OT Occupational Therapist                   Obj/Interventions       [facility] Name 06/07/24 1637          Range of Motion Comprehensive    Comment, General Range of Motion B shoulder AROM limited 90* flexion/abduction  -[facility] Name 06/07/24 1637          Strength Comprehensive (MMT)    Comment, General Manual Muscle Testing (MMT) Assessment BUE 3/5  -MP       [facility] Name 06/07/24 1637          Balance    Balance Interventions sitting;standing;sit to stand;supported;static;dynamic  -               User Key  (r) = Recorded By, (t) = Taken By, (c) = Cosigned By      Initials Name Provider Type    Negro Ortez OT Occupational Therapist                   Goals/Plan       Row Name 06/07/24 1647          Bed Mobility Goal 1 (OT)    Activity/Assistive Device (Bed Mobility Goal 1, OT) bed mobility activities, all  -MP     [place] Level/Cues Needed (Bed Mobility Goal 1, OT) minimum assist (75% or more patient effort)  -MP     Time Frame (Bed Mobility Goal 1, OT) long term goal (LTG);2 weeks  -MP       [facility] Name 06/07/24 1647          Transfer Goal 1 (OT)    Activity/Assistive Device (Transfer Goal 1, OT)  sit-to-stand/stand-to-sit;toilet  -MP     [place] Level/Cues Needed (Transfer Goal 1, OT) modified independence  -MP     Time Frame (Transfer Goal 1, OT) long term goal (LTG);2 weeks  -MP       Row Name 06/07/24 1647          Dressing Goal 1 (OT)    Activity/Device (Dressing Goal 1, OT) lower body dressing  -MP     [place]/Cues Needed (Dressing Goal 1, OT) supervision required;set-up required  -MP     Time Frame (Dressing Goal 1, OT) long term goal (LTG);2 weeks  -MP               User Key  (r) = Recorded By, (t) = Taken By, (c) = Cosigned By      Initials Name Provider Type    MP Negro Foster, YAMILA Occupational Therapist                   Clinical Impression       Row Name 06/07/24 1636          Pain Scale: FACES Pre/Post-Treatment    Pain: FACES Scale, Pretreatment 2-->hurts little bit  -MP     Posttreatment Pain Rating 8-->hurts whole lot  -MP       Row Name 06/07/24 1631          Plan of Care Review    Plan of Care Reviewed With patient  -MP     Progress no change  -MP     Outcome Evaluation Pt is a 74 y.o. female with CAD and NSTEMI. S/p CABG on 6/6/24. Pt is independent at baseline and assists with the care of her disabled . Pt's son also lives with them and is able/willing to assist. Pt. completes sit to stand transfer this date w/ min A for safety, increased nausea w/ functional mobility. Pt. ambulates w/ min A fors afety short distances, limited secondary to fatigue. Pt. provided max A for all LB ADLs. Anticipate safe for d/c home w/ family support assist, will follow up w/ pt. 1[facility]3x per [facility] at [facility] to assess progress. Recommend  therapy to evaluate home safety.  -MP       Row Name 06/07/24 0909          Therapy Assessment/Plan (OT)    Rehab Potential (OT) good, to achieve stated therapy goals  -MP     Criteria for Skilled Therapeutic Interventions Met (OT) yes;skilled treatment is necessary;meets criteria  -MP     Therapy Frequency (OT) 3 times/wk  -MP       Row Name 06/07/24 0718           Therapy Plan Review/Discharge Plan (OT)    Anticipated Discharge Disposition (OT) home with assist;home with home health  -MP       Row Name 06/07/24 1639          Vital Signs    Pre Patient Position Sitting  -MP     Intra Patient Position Standing  -MP     Post Patient Position Supine  -MP       Row Name 06/07/24 1639          Positioning and Restraints    Pre-Treatment Position sitting in chair/recliner  -MP     Post Treatment Position bed  -MP     In Bed supine;call light within reach;encouraged to call for assist;exit alarm on  -MP               User Key  (r) = Recorded By, (t) = Taken By, (c) = Cosigned By      Initials Name Provider Type    Negro Ortez OT Occupational Therapist                   Outcome Measures       Row Name 06/07/24 1320          How much help from another person do you currently need...    Turning from your back to your side while in flat bed without using bedrails? 2  -AH     Moving from lying on back to sitting on the side of a flat bed without bedrails? 2  -AH     Moving to and from a bed to a chair (including a wheelchair)? 3  -AH     Standing up from a chair using your arms (e.g., wheelchair, bedside chair)? 4  -AH     Climbing 3-5 steps with a railing? 2  -AH     To walk in hospital room? 3  -AH     AM-PAC 6 Clicks Score (PT) 16  -AH     Highest Level of Mobility Goal 5 --> Static standing  -AH               User Key  (r) = Recorded By, (t) = Taken By, (c) = Cosigned By      Initials Name Provider Type    Ameila Mo PT Physical Therapist                    Occupational Therapy Education       Title: PT OT SLP Therapies (In Progress)       Topic: Occupational Therapy (In Progress)       Point: ADL training (Not Started)       Description:   Instruct learner(s) on proper safety adaptation and remediation techniques during self care or transfers.   Instruct in proper use of assistive devices.                  Learner Progress:  Not documented in this visit.               Point: Home exercise program (Not Started)       Description:   Instruct learner(s) on appropriate technique for monitoring, assisting and/or progressing therapeutic exercises/activities.                  Learner Progress:  Not documented in this visit.              Point: Precautions (Not Started)       Description:   Instruct learner(s) on prescribed precautions during self-care and functional transfers.                  Learner Progress:  Not documented in this visit.              Point: Body mechanics (Done)       Description:   Instruct learner(s) on proper positioning and spine alignment during self-care, functional mobility activities and/or exercises.                  Learning Progress Summary             Patient Acceptance, E,TB, VU by  at 6/7/2024 1647                                         User Key       Initials Effective Dates Name Provider Type Discipline     06/16/21 -  Negro Foster OT Occupational Therapist OT                  OT Recommendation and Plan  Therapy Frequency (OT): 3 times/wk  Plan of Care Review  Plan of Care Reviewed With: patient  Progress: no change  Outcome Evaluation: Pt is a 74 y.o. female with CAD and NSTEMI. S/p CABG on 6/6/24. Pt is independent at baseline and assists with the care of her disabled . Pt's son also lives with them and is able/willing to assist. Pt. completes sit to stand transfer this date w/ min A for safety, increased nausea w/ functional mobility. Pt. ambulates w/ min A fors afety short distances, limited secondary to fatigue. Pt. provided max A for all LB ADLs. Anticipate safe for d/c home w/ family support assist, will follow up w/ pt. 1-3x per week at [facility] to assess progress. Recommend  therapy to evaluate home safety.     Time Calculation:         Time Calculation- OT       Row Name 06/07/24 1647             Time Calculation- OT    OT Start Time 1054  -MP      OT Stop Time 1120  -      OT Time Calculation (min) 26 min  -MP       Total Timed Code Minutes- OT 0 minute(s)  -MP      OT Received On 06/07/24  -MP      OT - Next Appointment 06/10/24  -JOSEFINA      OT Goal Re-Cert Due Date 06/21/24  -                User Key  (r) = Recorded By, (t) = Taken By, (c) = Cosigned By      Initials Name Provider Type    Negro Ortez OT Occupational Therapist                  Therapy Charges for Today       Code Description Service Date Service Provider Modifiers Qty    61190255119 HC OT EVAL LOW COMPLEXITY 4 6/7/2024 Negro Foster OT GO 1                 Negro Foster OT  6/7/2024

## 2024-06-07 NOTE — PLAN OF CARE
Goal Outcome Evaluation:  Plan of Care Reviewed With: patient           Outcome Evaluation: Pt is a 74 y.o. female with CAD and NSTEMI. S/p CABG on 6/6/24. Pt is independent at baseline and assists with the care of her disabled . Pt's son also lives with them and is able/willing to assist. Pt presents POD 1 with impaired endurance and activity tolerance, limited mostly by pain and nausea. She completes mobility with CGA/Eleno for sit to stand and ambulation, maxA x2 for bed mobility. pt presented with orthostatic BP in standing, but with MAP >70 and asymptomatic; she was able to remains standing and progress to ambulation. Therapist provided initial education for sternal precautions and heart hugger use; reminders needed throughout session. Pt is motivated and participates well. PT anticipates she will progress quickly and be safe to DC home with family assist. RW may be needed pending progress.      Anticipated Discharge Disposition (PT): home with assist

## 2024-06-08 ENCOUNTER — APPOINTMENT (OUTPATIENT)
Dept: GENERAL RADIOLOGY | Facility: HOSPITAL | Age: 74
End: 2024-06-08
Payer: MEDICARE

## 2024-06-08 LAB
ANION GAP SERPL CALCULATED.3IONS-SCNC: 8.4 MMOL/L (ref 5–15)
BUN SERPL-MCNC: 19 MG/DL (ref 8–23)
BUN/CREAT SERPL: 23.5 (ref 7–25)
CALCIUM SPEC-SCNC: 8.2 MG/DL (ref 8.6–10.5)
CHLORIDE SERPL-SCNC: 106 MMOL/L (ref 98–107)
CO2 SERPL-SCNC: 27.6 MMOL/L (ref 22–29)
CREAT SERPL-MCNC: 0.81 MG/DL (ref 0.57–1)
DEPRECATED RDW RBC AUTO: 51.8 FL (ref 37–54)
EGFRCR SERPLBLD CKD-EPI 2021: 75.8 ML/MIN/1.73
ERYTHROCYTE [DISTWIDTH] IN BLOOD BY AUTOMATED COUNT: 14.4 % (ref 12.3–15.4)
GLUCOSE BLDC GLUCOMTR-MCNC: 129 MG/DL (ref 70–105)
GLUCOSE BLDC GLUCOMTR-MCNC: 137 MG/DL (ref 70–105)
GLUCOSE BLDC GLUCOMTR-MCNC: 138 MG/DL (ref 70–105)
GLUCOSE BLDC GLUCOMTR-MCNC: 144 MG/DL (ref 70–105)
GLUCOSE SERPL-MCNC: 128 MG/DL (ref 65–99)
HCT VFR BLD AUTO: 30.9 % (ref 34–46.6)
HGB BLD-MCNC: 9.9 G/DL (ref 12–15.9)
MCH RBC QN AUTO: 31.4 PG (ref 26.6–33)
MCHC RBC AUTO-ENTMCNC: 32 G/DL (ref 31.5–35.7)
MCV RBC AUTO: 98.1 FL (ref 79–97)
PLATELET # BLD AUTO: 121 10*3/MM3 (ref 140–450)
PMV BLD AUTO: 9.5 FL (ref 6–12)
POTASSIUM SERPL-SCNC: 3.2 MMOL/L (ref 3.5–5.2)
POTASSIUM SERPL-SCNC: 3.7 MMOL/L (ref 3.5–5.2)
QT INTERVAL: 388 MS
QTC INTERVAL: 468 MS
RBC # BLD AUTO: 3.15 10*6/MM3 (ref 3.77–5.28)
SODIUM SERPL-SCNC: 142 MMOL/L (ref 136–145)
WBC NRBC COR # BLD AUTO: 10.15 10*3/MM3 (ref 3.4–10.8)

## 2024-06-08 PROCEDURE — 82948 REAGENT STRIP/BLOOD GLUCOSE: CPT | Performed by: THORACIC SURGERY (CARDIOTHORACIC VASCULAR SURGERY)

## 2024-06-08 PROCEDURE — 25010000002 CALCIUM GLUCONATE 2-0.675 GM/100ML-% SOLUTION: Performed by: THORACIC SURGERY (CARDIOTHORACIC VASCULAR SURGERY)

## 2024-06-08 PROCEDURE — 84132 ASSAY OF SERUM POTASSIUM: CPT | Performed by: THORACIC SURGERY (CARDIOTHORACIC VASCULAR SURGERY)

## 2024-06-08 PROCEDURE — 85027 COMPLETE CBC AUTOMATED: CPT | Performed by: NURSE PRACTITIONER

## 2024-06-08 PROCEDURE — 71045 X-RAY EXAM CHEST 1 VIEW: CPT

## 2024-06-08 PROCEDURE — 93005 ELECTROCARDIOGRAM TRACING: CPT | Performed by: NURSE PRACTITIONER

## 2024-06-08 PROCEDURE — 25010000002 METOCLOPRAMIDE PER 10 MG: Performed by: NURSE PRACTITIONER

## 2024-06-08 PROCEDURE — 80048 BASIC METABOLIC PNL TOTAL CA: CPT | Performed by: NURSE PRACTITIONER

## 2024-06-08 PROCEDURE — 25010000002 ENOXAPARIN PER 10 MG: Performed by: NURSE PRACTITIONER

## 2024-06-08 PROCEDURE — 82948 REAGENT STRIP/BLOOD GLUCOSE: CPT

## 2024-06-08 RX ORDER — FUROSEMIDE 40 MG/1
40 TABLET ORAL DAILY
Status: DISCONTINUED | OUTPATIENT
Start: 2024-06-08 | End: 2024-06-13 | Stop reason: HOSPADM

## 2024-06-08 RX ORDER — CALCIUM GLUCONATE 20 MG/ML
2000 INJECTION, SOLUTION INTRAVENOUS ONCE
Status: COMPLETED | OUTPATIENT
Start: 2024-06-08 | End: 2024-06-08

## 2024-06-08 RX ORDER — DEXTROSE MONOHYDRATE 25 G/50ML
25 INJECTION, SOLUTION INTRAVENOUS
Status: DISCONTINUED | OUTPATIENT
Start: 2024-06-08 | End: 2024-06-13 | Stop reason: HOSPADM

## 2024-06-08 RX ORDER — POLYETHYLENE GLYCOL 3350 17 G/17G
17 POWDER, FOR SOLUTION ORAL EVERY 12 HOURS PRN
Status: DISCONTINUED | OUTPATIENT
Start: 2024-06-08 | End: 2024-06-13 | Stop reason: HOSPADM

## 2024-06-08 RX ORDER — INSULIN LISPRO 100 [IU]/ML
2-9 INJECTION, SOLUTION INTRAVENOUS; SUBCUTANEOUS
Status: DISCONTINUED | OUTPATIENT
Start: 2024-06-08 | End: 2024-06-13 | Stop reason: HOSPADM

## 2024-06-08 RX ORDER — IBUPROFEN 600 MG/1
1 TABLET ORAL
Status: DISCONTINUED | OUTPATIENT
Start: 2024-06-08 | End: 2024-06-13 | Stop reason: HOSPADM

## 2024-06-08 RX ORDER — POTASSIUM CHLORIDE 20 MEQ/1
40 TABLET, EXTENDED RELEASE ORAL EVERY 4 HOURS
Status: COMPLETED | OUTPATIENT
Start: 2024-06-08 | End: 2024-06-08

## 2024-06-08 RX ORDER — NYSTATIN 100000 [USP'U]/G
POWDER TOPICAL EVERY 12 HOURS SCHEDULED
Status: DISCONTINUED | OUTPATIENT
Start: 2024-06-08 | End: 2024-06-13 | Stop reason: HOSPADM

## 2024-06-08 RX ORDER — POTASSIUM CHLORIDE 20 MEQ/1
20 TABLET, EXTENDED RELEASE ORAL ONCE
Status: COMPLETED | OUTPATIENT
Start: 2024-06-08 | End: 2024-06-08

## 2024-06-08 RX ORDER — NICOTINE POLACRILEX 4 MG
15 LOZENGE BUCCAL
Status: DISCONTINUED | OUTPATIENT
Start: 2024-06-08 | End: 2024-06-13 | Stop reason: HOSPADM

## 2024-06-08 RX ORDER — POTASSIUM CHLORIDE 20 MEQ/1
20 TABLET, EXTENDED RELEASE ORAL DAILY
Status: DISCONTINUED | OUTPATIENT
Start: 2024-06-08 | End: 2024-06-13 | Stop reason: HOSPADM

## 2024-06-08 RX ORDER — AMOXICILLIN 250 MG
2 CAPSULE ORAL EVERY 12 HOURS PRN
Status: DISCONTINUED | OUTPATIENT
Start: 2024-06-08 | End: 2024-06-13 | Stop reason: HOSPADM

## 2024-06-08 RX ADMIN — HYDROCODONE BITARTRATE AND ACETAMINOPHEN 1 TABLET: 5; 325 TABLET ORAL at 06:14

## 2024-06-08 RX ADMIN — NYSTATIN: 100000 POWDER TOPICAL at 14:48

## 2024-06-08 RX ADMIN — Medication 12.5 MG: at 21:45

## 2024-06-08 RX ADMIN — CHLORHEXIDINE GLUCONATE, 0.12% ORAL RINSE 15 ML: 1.2 SOLUTION DENTAL at 21:45

## 2024-06-08 RX ADMIN — HYDROCODONE BITARTRATE AND ACETAMINOPHEN 1 TABLET: 5; 325 TABLET ORAL at 11:37

## 2024-06-08 RX ADMIN — PANTOPRAZOLE SODIUM 40 MG: 40 TABLET, DELAYED RELEASE ORAL at 05:34

## 2024-06-08 RX ADMIN — METOCLOPRAMIDE 10 MG: 5 INJECTION, SOLUTION INTRAMUSCULAR; INTRAVENOUS at 00:13

## 2024-06-08 RX ADMIN — ENOXAPARIN SODIUM 40 MG: 100 INJECTION SUBCUTANEOUS at 09:02

## 2024-06-08 RX ADMIN — POTASSIUM CHLORIDE 40 MEQ: 1500 TABLET, EXTENDED RELEASE ORAL at 13:21

## 2024-06-08 RX ADMIN — NYSTATIN: 100000 POWDER TOPICAL at 21:47

## 2024-06-08 RX ADMIN — FUROSEMIDE 40 MG: 40 TABLET ORAL at 09:01

## 2024-06-08 RX ADMIN — Medication 12.5 MG: at 09:01

## 2024-06-08 RX ADMIN — MUPIROCIN 1 APPLICATION: 20 OINTMENT TOPICAL at 21:45

## 2024-06-08 RX ADMIN — CALCIUM GLUCONATE 2000 MG: 20 INJECTION, SOLUTION INTRAVENOUS at 09:00

## 2024-06-08 RX ADMIN — METOCLOPRAMIDE 10 MG: 5 INJECTION, SOLUTION INTRAMUSCULAR; INTRAVENOUS at 05:34

## 2024-06-08 RX ADMIN — ACETAMINOPHEN 650 MG: 650 SOLUTION ORAL at 17:05

## 2024-06-08 RX ADMIN — MUPIROCIN 1 APPLICATION: 20 OINTMENT TOPICAL at 09:01

## 2024-06-08 RX ADMIN — ENOXAPARIN SODIUM 40 MG: 100 INJECTION SUBCUTANEOUS at 21:46

## 2024-06-08 RX ADMIN — ASPIRIN 81 MG: 81 TABLET, COATED ORAL at 09:02

## 2024-06-08 RX ADMIN — SENNOSIDES AND DOCUSATE SODIUM 2 TABLET: 50; 8.6 TABLET ORAL at 09:01

## 2024-06-08 RX ADMIN — CHLORHEXIDINE GLUCONATE, 0.12% ORAL RINSE 15 ML: 1.2 SOLUTION DENTAL at 09:01

## 2024-06-08 RX ADMIN — POTASSIUM CHLORIDE 20 MEQ: 1500 TABLET, EXTENDED RELEASE ORAL at 21:45

## 2024-06-08 RX ADMIN — POTASSIUM CHLORIDE 40 MEQ: 1500 TABLET, EXTENDED RELEASE ORAL at 09:01

## 2024-06-08 NOTE — CONSULTS
Diabetes Education    Patient Name:  Jeannie Ruiz  YOB: 1950  MRN: [id number]  Admit Date:  6/5/2024        MD consult for A1c >7.0%. On 6/5/2024 A1c was 6.74%. A1c info sheet given with discussion on A1c target and healthy blood sugar range. 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 Patient stated she doesn't have a glucometer and has never checked her blood sugar. Daughter at bedside and involved with discussion. Discussed with patient the importance of checking blood sugar post surgery and good blood sugar control Log book given with discussion on checking blood sugar daily varying the times before meals and at bedtime.  Prescriptions started in discharge orders for lancets, test strips, and Accu-chek Guide Me glucometer. Patient and daughter have no further questions or concerns related to diabetes at this time.      Electronically signed by:  Kathleen Amor RN  06/07/24 21:18 EDT

## 2024-06-09 LAB
ANION GAP SERPL CALCULATED.3IONS-SCNC: 6.9 MMOL/L (ref 5–15)
BUN SERPL-MCNC: 24 MG/DL (ref 8–23)
BUN/CREAT SERPL: 31.6 (ref 7–25)
CALCIUM SPEC-SCNC: 8.6 MG/DL (ref 8.6–10.5)
CHLORIDE SERPL-SCNC: 103 MMOL/L (ref 98–107)
CO2 SERPL-SCNC: 29.1 MMOL/L (ref 22–29)
CREAT SERPL-MCNC: 0.76 MG/DL (ref 0.57–1)
DEPRECATED RDW RBC AUTO: 51.6 FL (ref 37–54)
EGFRCR SERPLBLD CKD-EPI 2021: 81.8 ML/MIN/1.73
ERYTHROCYTE [DISTWIDTH] IN BLOOD BY AUTOMATED COUNT: 14.4 % (ref 12.3–15.4)
GLUCOSE BLDC GLUCOMTR-MCNC: 125 MG/DL (ref 70–105)
GLUCOSE BLDC GLUCOMTR-MCNC: 139 MG/DL (ref 70–105)
GLUCOSE BLDC GLUCOMTR-MCNC: 141 MG/DL (ref 70–105)
GLUCOSE BLDC GLUCOMTR-MCNC: 148 MG/DL (ref 70–105)
GLUCOSE SERPL-MCNC: 132 MG/DL (ref 65–99)
HCT VFR BLD AUTO: 31 % (ref 34–46.6)
HGB BLD-MCNC: 9.9 G/DL (ref 12–15.9)
MCH RBC QN AUTO: 31.2 PG (ref 26.6–33)
MCHC RBC AUTO-ENTMCNC: 31.9 G/DL (ref 31.5–35.7)
MCV RBC AUTO: 97.8 FL (ref 79–97)
PLATELET # BLD AUTO: 130 10*3/MM3 (ref 140–450)
PMV BLD AUTO: 9.5 FL (ref 6–12)
POTASSIUM SERPL-SCNC: 3.6 MMOL/L (ref 3.5–5.2)
POTASSIUM SERPL-SCNC: 3.7 MMOL/L (ref 3.5–5.2)
POTASSIUM SERPL-SCNC: 3.9 MMOL/L (ref 3.5–5.2)
QT INTERVAL: 252 MS
QTC INTERVAL: 422 MS
RBC # BLD AUTO: 3.17 10*6/MM3 (ref 3.77–5.28)
SODIUM SERPL-SCNC: 139 MMOL/L (ref 136–145)
WBC NRBC COR # BLD AUTO: 10.15 10*3/MM3 (ref 3.4–10.8)

## 2024-06-09 PROCEDURE — 25010000002 ENOXAPARIN PER 10 MG: Performed by: NURSE PRACTITIONER

## 2024-06-09 PROCEDURE — 84132 ASSAY OF SERUM POTASSIUM: CPT | Performed by: THORACIC SURGERY (CARDIOTHORACIC VASCULAR SURGERY)

## 2024-06-09 PROCEDURE — 25010000002 ONDANSETRON PER 1 MG: Performed by: NURSE PRACTITIONER

## 2024-06-09 PROCEDURE — 25010000002 BUMETANIDE PER 0.5 MG: Performed by: THORACIC SURGERY (CARDIOTHORACIC VASCULAR SURGERY)

## 2024-06-09 PROCEDURE — 25010000002 AMIODARONE IN DEXTROSE 5% 150-4.21 MG/100ML-% SOLUTION

## 2024-06-09 PROCEDURE — 93005 ELECTROCARDIOGRAM TRACING: CPT | Performed by: THORACIC SURGERY (CARDIOTHORACIC VASCULAR SURGERY)

## 2024-06-09 PROCEDURE — 82948 REAGENT STRIP/BLOOD GLUCOSE: CPT | Performed by: THORACIC SURGERY (CARDIOTHORACIC VASCULAR SURGERY)

## 2024-06-09 PROCEDURE — 80048 BASIC METABOLIC PNL TOTAL CA: CPT | Performed by: NURSE PRACTITIONER

## 2024-06-09 PROCEDURE — 82948 REAGENT STRIP/BLOOD GLUCOSE: CPT

## 2024-06-09 PROCEDURE — 85027 COMPLETE CBC AUTOMATED: CPT | Performed by: NURSE PRACTITIONER

## 2024-06-09 PROCEDURE — 25010000002 AMIODARONE IN DEXTROSE 5% 360-4.14 MG/200ML-% SOLUTION: Performed by: THORACIC SURGERY (CARDIOTHORACIC VASCULAR SURGERY)

## 2024-06-09 RX ORDER — POTASSIUM CHLORIDE 1.5 G/1.58G
40 POWDER, FOR SOLUTION ORAL EVERY 4 HOURS
Status: COMPLETED | OUTPATIENT
Start: 2024-06-09 | End: 2024-06-09

## 2024-06-09 RX ORDER — POTASSIUM CHLORIDE 20 MEQ/1
20 TABLET, EXTENDED RELEASE ORAL ONCE
Status: COMPLETED | OUTPATIENT
Start: 2024-06-09 | End: 2024-06-09

## 2024-06-09 RX ORDER — BUMETANIDE 0.25 MG/ML
2 INJECTION INTRAMUSCULAR; INTRAVENOUS ONCE
Status: COMPLETED | OUTPATIENT
Start: 2024-06-09 | End: 2024-06-09

## 2024-06-09 RX ADMIN — MUPIROCIN 1 APPLICATION: 20 OINTMENT TOPICAL at 09:16

## 2024-06-09 RX ADMIN — POTASSIUM CHLORIDE 40 MEQ: 1.5 POWDER, FOR SOLUTION ORAL at 18:05

## 2024-06-09 RX ADMIN — METOPROLOL TARTRATE 25 MG: 25 TABLET, FILM COATED ORAL at 21:52

## 2024-06-09 RX ADMIN — NYSTATIN: 100000 POWDER TOPICAL at 09:17

## 2024-06-09 RX ADMIN — CHLORHEXIDINE GLUCONATE, 0.12% ORAL RINSE 15 ML: 1.2 SOLUTION DENTAL at 09:16

## 2024-06-09 RX ADMIN — PANTOPRAZOLE SODIUM 40 MG: 40 TABLET, DELAYED RELEASE ORAL at 05:58

## 2024-06-09 RX ADMIN — MUPIROCIN 1 APPLICATION: 20 OINTMENT TOPICAL at 21:52

## 2024-06-09 RX ADMIN — POTASSIUM CHLORIDE 20 MEQ: 1500 TABLET, EXTENDED RELEASE ORAL at 22:38

## 2024-06-09 RX ADMIN — ONDANSETRON 4 MG: 2 INJECTION INTRAMUSCULAR; INTRAVENOUS at 12:03

## 2024-06-09 RX ADMIN — AMIODARONE HYDROCHLORIDE 150 MG: 1.5 INJECTION, SOLUTION INTRAVENOUS at 18:30

## 2024-06-09 RX ADMIN — METOPROLOL TARTRATE 25 MG: 25 TABLET, FILM COATED ORAL at 09:16

## 2024-06-09 RX ADMIN — ENOXAPARIN SODIUM 40 MG: 100 INJECTION SUBCUTANEOUS at 21:52

## 2024-06-09 RX ADMIN — ENOXAPARIN SODIUM 40 MG: 100 INJECTION SUBCUTANEOUS at 09:16

## 2024-06-09 RX ADMIN — POTASSIUM CHLORIDE 40 MEQ: 1.5 POWDER, FOR SOLUTION ORAL at 13:07

## 2024-06-09 RX ADMIN — POTASSIUM CHLORIDE 20 MEQ: 1500 TABLET, EXTENDED RELEASE ORAL at 09:16

## 2024-06-09 RX ADMIN — NYSTATIN: 100000 POWDER TOPICAL at 21:58

## 2024-06-09 RX ADMIN — CHLORHEXIDINE GLUCONATE, 0.12% ORAL RINSE 15 ML: 1.2 SOLUTION DENTAL at 21:52

## 2024-06-09 RX ADMIN — AMIODARONE HYDROCHLORIDE 1 MG/MIN: 1.8 INJECTION, SOLUTION INTRAVENOUS at 19:26

## 2024-06-09 RX ADMIN — ASPIRIN 81 MG: 81 TABLET, COATED ORAL at 09:16

## 2024-06-09 RX ADMIN — FUROSEMIDE 40 MG: 40 TABLET ORAL at 09:16

## 2024-06-09 RX ADMIN — POTASSIUM CHLORIDE 20 MEQ: 1500 TABLET, EXTENDED RELEASE ORAL at 05:58

## 2024-06-09 RX ADMIN — BUMETANIDE 2 MG: 0.25 INJECTION INTRAMUSCULAR; INTRAVENOUS at 12:03

## 2024-06-09 NOTE — PROGRESS NOTES
Still a little bit puffy.  Oxygen at night.  She has her days and nights mixed up.  Given extra dose of Bumex.  I will leave wires today.  Probably home Tuesday.

## 2024-06-10 PROBLEM — Z95.1 S/P CABG X 3: Status: ACTIVE | Noted: 2024-06-10

## 2024-06-10 PROBLEM — I48.91 POSTOPERATIVE ATRIAL FIBRILLATION: Status: ACTIVE | Noted: 2024-06-10

## 2024-06-10 PROBLEM — I97.89 POSTOPERATIVE ATRIAL FIBRILLATION: Status: ACTIVE | Noted: 2024-06-10

## 2024-06-10 LAB
ANION GAP SERPL CALCULATED.3IONS-SCNC: 10.4 MMOL/L (ref 5–15)
BUN SERPL-MCNC: 20 MG/DL (ref 8–23)
BUN/CREAT SERPL: 26.3 (ref 7–25)
CALCIUM SPEC-SCNC: 8.2 MG/DL (ref 8.6–10.5)
CHLORIDE SERPL-SCNC: 97 MMOL/L (ref 98–107)
CO2 SERPL-SCNC: 28.6 MMOL/L (ref 22–29)
CREAT SERPL-MCNC: 0.76 MG/DL (ref 0.57–1)
DEPRECATED RDW RBC AUTO: 50.8 FL (ref 37–54)
EGFRCR SERPLBLD CKD-EPI 2021: 81.8 ML/MIN/1.73
ERYTHROCYTE [DISTWIDTH] IN BLOOD BY AUTOMATED COUNT: 14.3 % (ref 12.3–15.4)
GLUCOSE BLDC GLUCOMTR-MCNC: 103 MG/DL (ref 70–105)
GLUCOSE BLDC GLUCOMTR-MCNC: 125 MG/DL (ref 70–105)
GLUCOSE BLDC GLUCOMTR-MCNC: 126 MG/DL (ref 70–105)
GLUCOSE BLDC GLUCOMTR-MCNC: 139 MG/DL (ref 70–105)
GLUCOSE SERPL-MCNC: 119 MG/DL (ref 65–99)
HCT VFR BLD AUTO: 30.9 % (ref 34–46.6)
HGB BLD-MCNC: 10.1 G/DL (ref 12–15.9)
MCH RBC QN AUTO: 31.8 PG (ref 26.6–33)
MCHC RBC AUTO-ENTMCNC: 32.7 G/DL (ref 31.5–35.7)
MCV RBC AUTO: 97.2 FL (ref 79–97)
PLATELET # BLD AUTO: 168 10*3/MM3 (ref 140–450)
PMV BLD AUTO: 9.6 FL (ref 6–12)
POTASSIUM SERPL-SCNC: 3.6 MMOL/L (ref 3.5–5.2)
POTASSIUM SERPL-SCNC: 4.2 MMOL/L (ref 3.5–5.2)
QT INTERVAL: 417 MS
QTC INTERVAL: 494 MS
RBC # BLD AUTO: 3.18 10*6/MM3 (ref 3.77–5.28)
SODIUM SERPL-SCNC: 136 MMOL/L (ref 136–145)
WBC NRBC COR # BLD AUTO: 8.93 10*3/MM3 (ref 3.4–10.8)

## 2024-06-10 PROCEDURE — 97110 THERAPEUTIC EXERCISES: CPT

## 2024-06-10 PROCEDURE — 84132 ASSAY OF SERUM POTASSIUM: CPT | Performed by: THORACIC SURGERY (CARDIOTHORACIC VASCULAR SURGERY)

## 2024-06-10 PROCEDURE — 82948 REAGENT STRIP/BLOOD GLUCOSE: CPT | Performed by: THORACIC SURGERY (CARDIOTHORACIC VASCULAR SURGERY)

## 2024-06-10 PROCEDURE — 99024 POSTOP FOLLOW-UP VISIT: CPT | Performed by: NURSE PRACTITIONER

## 2024-06-10 PROCEDURE — 25010000002 AMIODARONE IN DEXTROSE 5% 360-4.14 MG/200ML-% SOLUTION: Performed by: THORACIC SURGERY (CARDIOTHORACIC VASCULAR SURGERY)

## 2024-06-10 PROCEDURE — 94762 N-INVAS EAR/PLS OXIMTRY CONT: CPT

## 2024-06-10 PROCEDURE — 25010000002 MAGNESIUM SULFATE 2 GM/50ML SOLUTION: Performed by: NURSE PRACTITIONER

## 2024-06-10 PROCEDURE — 97535 SELF CARE MNGMENT TRAINING: CPT

## 2024-06-10 PROCEDURE — 85027 COMPLETE CBC AUTOMATED: CPT | Performed by: NURSE PRACTITIONER

## 2024-06-10 PROCEDURE — 82948 REAGENT STRIP/BLOOD GLUCOSE: CPT

## 2024-06-10 PROCEDURE — 25010000002 ENOXAPARIN PER 10 MG: Performed by: NURSE PRACTITIONER

## 2024-06-10 PROCEDURE — 80048 BASIC METABOLIC PNL TOTAL CA: CPT | Performed by: NURSE PRACTITIONER

## 2024-06-10 PROCEDURE — 97116 GAIT TRAINING THERAPY: CPT

## 2024-06-10 PROCEDURE — 97530 THERAPEUTIC ACTIVITIES: CPT

## 2024-06-10 RX ORDER — AMIODARONE HYDROCHLORIDE 200 MG/1
200 TABLET ORAL 2 TIMES DAILY WITH MEALS
Status: DISCONTINUED | OUTPATIENT
Start: 2024-06-10 | End: 2024-06-13 | Stop reason: HOSPADM

## 2024-06-10 RX ORDER — MAGNESIUM SULFATE HEPTAHYDRATE 40 MG/ML
2 INJECTION, SOLUTION INTRAVENOUS ONCE
Status: COMPLETED | OUTPATIENT
Start: 2024-06-10 | End: 2024-06-10

## 2024-06-10 RX ORDER — POTASSIUM CHLORIDE 20 MEQ/1
40 TABLET, EXTENDED RELEASE ORAL EVERY 4 HOURS
Status: COMPLETED | OUTPATIENT
Start: 2024-06-10 | End: 2024-06-10

## 2024-06-10 RX ORDER — CETIRIZINE HYDROCHLORIDE 10 MG/1
10 TABLET ORAL DAILY
Status: DISCONTINUED | OUTPATIENT
Start: 2024-06-10 | End: 2024-06-13 | Stop reason: HOSPADM

## 2024-06-10 RX ADMIN — NYSTATIN: 100000 POWDER TOPICAL at 09:37

## 2024-06-10 RX ADMIN — POTASSIUM CHLORIDE 20 MEQ: 1500 TABLET, EXTENDED RELEASE ORAL at 09:36

## 2024-06-10 RX ADMIN — POTASSIUM CHLORIDE 40 MEQ: 1500 TABLET, EXTENDED RELEASE ORAL at 13:51

## 2024-06-10 RX ADMIN — PANTOPRAZOLE SODIUM 40 MG: 40 TABLET, DELAYED RELEASE ORAL at 05:52

## 2024-06-10 RX ADMIN — AMIODARONE HYDROCHLORIDE 200 MG: 200 TABLET ORAL at 10:05

## 2024-06-10 RX ADMIN — METOPROLOL TARTRATE 25 MG: 25 TABLET, FILM COATED ORAL at 09:35

## 2024-06-10 RX ADMIN — MAGNESIUM SULFATE HEPTAHYDRATE 2 G: 2 INJECTION, SOLUTION INTRAVENOUS at 10:05

## 2024-06-10 RX ADMIN — AMIODARONE HYDROCHLORIDE 0.5 MG/MIN: 1.8 INJECTION, SOLUTION INTRAVENOUS at 01:29

## 2024-06-10 RX ADMIN — ENOXAPARIN SODIUM 40 MG: 100 INJECTION SUBCUTANEOUS at 09:35

## 2024-06-10 RX ADMIN — CETIRIZINE HYDROCHLORIDE 10 MG: 10 TABLET, FILM COATED ORAL at 10:05

## 2024-06-10 RX ADMIN — MUPIROCIN 1 APPLICATION: 20 OINTMENT TOPICAL at 21:18

## 2024-06-10 RX ADMIN — FUROSEMIDE 40 MG: 40 TABLET ORAL at 09:35

## 2024-06-10 RX ADMIN — Medication 500 ML: at 21:22

## 2024-06-10 RX ADMIN — METOPROLOL TARTRATE 25 MG: 25 TABLET, FILM COATED ORAL at 21:23

## 2024-06-10 RX ADMIN — ENOXAPARIN SODIUM 40 MG: 100 INJECTION SUBCUTANEOUS at 21:18

## 2024-06-10 RX ADMIN — MUPIROCIN 1 APPLICATION: 20 OINTMENT TOPICAL at 09:37

## 2024-06-10 RX ADMIN — NYSTATIN: 100000 POWDER TOPICAL at 21:21

## 2024-06-10 RX ADMIN — AMIODARONE HYDROCHLORIDE 200 MG: 200 TABLET ORAL at 17:25

## 2024-06-10 RX ADMIN — ASPIRIN 81 MG: 81 TABLET, COATED ORAL at 09:35

## 2024-06-10 RX ADMIN — POTASSIUM CHLORIDE 40 MEQ: 1500 TABLET, EXTENDED RELEASE ORAL at 09:36

## 2024-06-10 NOTE — CASE MANAGEMENT/SOCIAL WORK
Continued Stay Note  OLIVIA Paul     Patient Name: Jeannie Ruiz  MRN: [id number]  Today's Date: 6/10/2024    Admit Date: 6/5/2024    Plan: DC Plan: Anticipate routine home with family. Watch for home oxygen needs. CABG 6/6/2024   Discharge Plan       Row Name 06/10/24 1535       Plan    Plan DC Plan: Anticipate routine home with family. Watch for home oxygen needs. CABG 6/6/2024    Patient/Family in Agreement with Plan yes    Provided Post Acute Provider List? N/A    Provided Post Acute Provider Quality & Resource List? N/A    Plan Comments CM spoke with patient’s nurse and [facility] NP Madeline Grover to obtain clinical updates. Patient in atrial fibrillation over the weekend. Amiodarone gtt to transition to oral dose today. Potentilally ready for DC tomorrow 6/11/2024. Nocturnal Oximetry ordered for tonight.CM will continue to follow for any further needs and adjust discharge plan accordingly. DC Barriers: POD 4 OHS, cardiac monitoring, O2@1L nc, pacer wires, and monitor labs.               Expected Discharge Date and Time       Expected Discharge Date Expected Discharge Time    Jun 11, 2024           Phone communication or documentation only- no physical contact with patient or family.      Amanda Eduardo RN    Office Phone: [phone]  Office Cell:     [phone]

## 2024-06-10 NOTE — THERAPY TREATMENT NOTE
Subjective: Pt agreeable to therapeutic plan of care.    Objective:     Bed mobility - Mod-A for supine to sit - assist for trunk elevation.   Transfers - CGA and with rolling walker sit to/from stand from EOB and recliner.   Ambulation - 70 feet CGA and with rolling walker    Vitals: WNL; titrated to RA with sats 94% at rest and 97% with ambulation.     Phase 1 Cardiac Rehab Initiated - Acute Care    Cardiac Level III Activities  Sitting tolerance: >10min and supported  Standing tolerance: 5-10min and supported    Precautions:  Mid-sternal incision; avoid scapular retraction and depression.  Cardiovascular impairment post-sx; encourage energy conservation strategies.      MET level equivalent: 1.4-2.0 (Self care ADLs in sitting / slow ambulation in room, light intensity activities)      Pain: denies.      Education: Provided education on the importance of mobility in the acute care setting, Verbal/Tactile Cues, Transfer Training, Gait Training, Energy conservation strategies, and Post-Op Precautions    Assessment: Jeannie Ruiz presents with good progress toward all goals. Improving activity tolerance and able to titrate O2 to RA with sats stable at rest and with activity. Pt with endurance and functional mobility impairments which indicate the need for skilled intervention. Tolerating session today without incident. Will continue to follow and progress as tolerated.     Plan/Recommendations:   If medically appropriate, No ongoing therapy recommended post-acute care. No therapy needs. Pt requires no DME at discharge.     Pt desires Home with family assist at discharge. Pt cooperative; agreeable to therapeutic recommendations and plan of care.         Basic Mobility 6-click:  Rollin = Total, A lot = 2, A little = 3; 4 = None  Supine>Sit:   1 = Total, A lot = 2, A little = 3; 4 = None   Sit>Stand with arms:  1 = Total, A lot = 2, A little = 3; 4 = None  Bed>Chair:   1 = Total, A lot = 2, A little = 3; 4 =  None  Ambulate in room:  1 = Total, A lot = 2, A little = 3; 4 = None  3-5 Steps with railin = Total, A lot = 2, A little = 3; 4 = None  Score: 15    Modified [place]: N/A = No pre-op stroke/TIA    Post-Tx Position: Up in Chair, Alarms activated, and Call light and personal items within reach  PPE: gloves

## 2024-06-10 NOTE — PROGRESS NOTES
S/P POD# 4 urgent CABG x3 with LIMA--Isabel  EF 50% (echo)    Subjective:  reports she didn't feel well yesterday prior to going into rapid atrial fib    Went into rapid atrial fib yest, SR now  Drips:  amio .5  Wt is up 3 kgs from preop      Intake/Output Summary (Last 24 hours) at 6/10/2024 0848  Last data filed at 6/10/2024 0644  Gross per 24 hour   Intake 887 ml   Output 3750 ml   Net -2863 ml     Temp:  [98 °F (36.7 °C)-99.7 °F (37.6 °C)] 98.7 °F (37.1 °C)  Heart Rate:  [76-96] 82  Resp:  [10-23] 10  BP: ()/(42-76) 124/76      Results from last 7 days   Lab Units 06/10/24  0643 06/09/24  0238 06/07/24  0500 06/07/24  0217 06/06/24  1708 06/06/24  1655 06/06/24  1533 06/06/24  1531   WBC 10*3/mm3 8.93 10.15   < > 9.10  --  10.60  --  7.98   HEMOGLOBIN g/dL 10.1* 9.9*   < > 10.7*  --  11.6*  --  9.6*   HEMOGLOBIN, POC   --   --    < >  --    < >  --    < >  --    HEMATOCRIT % 30.9* 31.0*   < > 33.2*  --  36.1  --  29.5*   HEMATOCRIT POC   --   --    < >  --    < >  --    < >  --    PLATELETS 10*3/mm3 168 130*   < > 120*  --  114*  --  111*   INR   --   --   --  1.04  --  1.09  --  1.30*    < > = values in this interval not displayed.     Results from last 7 days   Lab Units 06/10/24  0643 06/08/24  0506 06/07/24  0217   CREATININE mg/dL 0.76   < > 1.00   POTASSIUM mmol/L 3.6   < > 4.1   SODIUM mmol/L 136   < > 142   MAGNESIUM mg/dL  --   --  2.6*   PHOSPHORUS mg/dL  --   --  5.3*    < > = values in this interval not displayed.     ica 1.19    Physical Exam:  Neuro intact, nad, resting in bed, margaret at bedside  Tele:  SR 80s  Diminished bases, 96% 1L  Sternotomy with moisture from breasts, suture noted in RLE thigh/ shin area, skin folds with erythema, likely yeast, left flank skin fold with erythema/ pustules from defib pad  Benign abd, + BM  Trace generalized edema    Assessment/Plan:  Principal Problem:    CAD (coronary artery disease)  Active Problems:    Non-STEMI (non-ST elevated myocardial infarction)     HTN (hypertension)    HLD (hyperlipidemia)    Type 2 diabetes mellitus, without long-term current use of insulin    - MV CAD including left main disease, EF 50% (echo)--s/p urgent CABG x 3 with LIMA to the dual LAD, SVG to PDA, SVG to a high marginal (Isabel)  - HTN--just off norepi  - HLD, statin intolerant--not taking Repatha d/t cost  - DM type 2--A1c 6.74  - CKD stage 3--stable  - Obesity, stage 3--BMI 39.9  - Postop ABLA, expected--watch closely  - Postop TCP, consumptive--watch closely  - Postop atrial fib--IV amio/bb, SR now    POD# 4.  She has converted to SR.  Transition IV amio to low dose oral amio.   Give 2 gm Mag IV.  On asa/bb.  No statin due to intolerance.  Consult skincare team.  Instructions given for antibacterial soap to sternotomy and dry gauze pad to wick moisture from breast tissue.  Mobilize.  Nocturnal oximetry ordered for tonight.  Will d/w Dr. Hall about anticoagulation.    Addendum:  DC wires--maintaining SR.  CTSP, pt does not want wires removed today d/t rapid atrial fib yesterday.  Order discontinued.    [id number] Score for Atrial Fibrillation Stroke Risk Implanet.Fly Victor  on 6/10/2024  RESULT SUMMARY:  6 points  Stroke risk was 9.7% per year in >90,000 patients (the [place] Atrial Fibrillation Cohort Study) and 13.6% risk of stroke/TIA/systemic embolism.    Routine care--as above  D/w pt/margaret, nsg, Dr. Hall  Anticipate home      ARLIN Grajeda  6/10/2024  08:48 EDT

## 2024-06-10 NOTE — PLAN OF CARE
Objective:     Bed mobility - Mod-A for supine to sit - assist for trunk elevation.   Transfers - CGA and with rolling walker sit to/from stand from EOB and recliner.   Ambulation - 70 feet CGA and with rolling walker    Vitals: WNL; titrated to RA with sats 94% at rest and 97% with ambulation.

## 2024-06-10 NOTE — THERAPY TREATMENT NOTE
Subjective: Pt agreeable to therapeutic plan of care.  Cognition: oriented to Person, Place, Time, and Situation  Patient recalls 1/3 sternal precautions, requiring max verbal cuing for precautions.     Objective:     Bed Mobility: utilizing compensatory strategy   Functional Transfers: Min-A     Balance: static and standing Min-A  Functional Ambulation: N/A or Not attempted.    Lower Body Dressing: Mod-A  ADL Position: supported sitting  ADL Comments: NA    Upper Body Dressing: Mod-A  ADL Position: supported sitting  ADL Comments: Heart Hugger    Patient recalls 3/3 sternal precautions, requiring min verbal cuing for precautions.    Phase 1 Cardiac Rehab Initiated - Acute Care    Cardiac Level II Activities  Sitting tolerance: >10min and supported  Standing tolerance: 1-5min and supported    Precautions:  Mid-sternal incision; avoid scapular retraction and depression.  Cardiovascular impairment post-sx; encourage energy conservation strategies.    Therapeutic Exercise:  OT provided and reviewed Cardiac Rehab HEP. Pt completes 1 set x 10 reps of each exercise with fair understanding. Encouraged to complete 3x/daily to facilitate increased activity tolerance. Pt will require additional education to ensure carryover.     MET level equivalent: 1.4-2.0 (Self care ADLs in sitting / slow ambulation in room, light intensity activities)     Pain: 0 VAS  Location: NA  Interventions for pain: N/A  Education: Provided education on the importance of mobility in the acute care setting, ADL training, and Transfer Training      Assessment: Jeannie Ruiz presents with ADL impairments affecting function including balance, endurance / activity tolerance, pain, and strength. Pt and daughter have multiple questions regarding home set up, ADLs, and compensations. OT provided and reviewed Cardiac Rehab HEP. Pt completes 1 set x 10 reps of each exercise with fair understanding. Encouraged to complete 3x/daily to facilitate increased  "activity tolerance. Pt will require additional education to ensure carryover. Demonstrated functioning below baseline abilities indicate the need for continued skilled intervention while inpatient. Tolerating session today without incident. Will continue to follow and progress as tolerated.     Plan/Recommendations:   Low Intensity Therapy recommended post-acute care - This is recommended as therapy feels this patient would require 2-3 visits per week. OP or HH would be the best option depending on patient's home bound status. Consider, if the patient has other  \"skilled\" needs such as wounds, IV antibiotics, etc. Combined with \"low intensity\" could also equate to a SNF. If patient is medically complex, consider LTAC.. Pt requires no DME at discharge.     Pt desires Home Health at discharge. Pt cooperative; agreeable to therapeutic recommendations and plan of care.     Post-Tx Position: Up in Chair, Alarms activated, and Call light and personal items within reach  PPE: gloves    "

## 2024-06-10 NOTE — NURSING NOTE
"74-year-old female who presented with burning chest pain.  Consult received to assess patient Folds.She states that some type of pad was left and when it was pulled out.  It caused some blistering to her folds.  They described \"pus \"coming out of it over the weekend.  It looks as though she is gothaso be open and blistered.  The area has a yeast rash.  This is also under her breast.  It is in her abdominal folds and groin folds.  Nystatin has been ordered and this is appropriate.  She is on low-air-loss therapy which is appropriate.  Would recommend adding Domeboro soaks to the abdominal fold to the left side.  Also consider using a folded pillowcase under her breast to keep them   "

## 2024-06-10 NOTE — PLAN OF CARE
Jeannie Ruiz presents with ADL impairments affecting function including balance, endurance / activity tolerance, pain, and strength. Pt and daughter have multiple questions regarding home set up, ADLs, and compensations. OT provided and reviewed Cardiac Rehab HEP. Pt completes 1 set x 10 reps of each exercise with fair understanding. Encouraged to complete 3x/daily to facilitate increased activity tolerance. Pt will require additional education to ensure carryover. Demonstrated functioning below baseline abilities indicate the need for continued skilled intervention while inpatient. Tolerating session today without incident. Will continue to follow and progress as tolerated.

## 2024-06-11 LAB
ANION GAP SERPL CALCULATED.3IONS-SCNC: 10 MMOL/L (ref 5–15)
BUN SERPL-MCNC: 25 MG/DL (ref 8–23)
BUN/CREAT SERPL: 30.1 (ref 7–25)
CALCIUM SPEC-SCNC: 8.4 MG/DL (ref 8.6–10.5)
CHLORIDE SERPL-SCNC: 100 MMOL/L (ref 98–107)
CO2 SERPL-SCNC: 27 MMOL/L (ref 22–29)
CREAT SERPL-MCNC: 0.83 MG/DL (ref 0.57–1)
DEPRECATED RDW RBC AUTO: 51.5 FL (ref 37–54)
EGFRCR SERPLBLD CKD-EPI 2021: 73.6 ML/MIN/1.73
ERYTHROCYTE [DISTWIDTH] IN BLOOD BY AUTOMATED COUNT: 14.5 % (ref 12.3–15.4)
GLUCOSE BLDC GLUCOMTR-MCNC: 112 MG/DL (ref 70–105)
GLUCOSE BLDC GLUCOMTR-MCNC: 113 MG/DL (ref 70–105)
GLUCOSE BLDC GLUCOMTR-MCNC: 116 MG/DL (ref 70–105)
GLUCOSE BLDC GLUCOMTR-MCNC: 138 MG/DL (ref 70–105)
GLUCOSE BLDC GLUCOMTR-MCNC: 99 MG/DL (ref 70–105)
GLUCOSE SERPL-MCNC: 111 MG/DL (ref 65–99)
HCT VFR BLD AUTO: 30.3 % (ref 34–46.6)
HGB BLD-MCNC: 9.5 G/DL (ref 12–15.9)
MCH RBC QN AUTO: 30.5 PG (ref 26.6–33)
MCHC RBC AUTO-ENTMCNC: 31.4 G/DL (ref 31.5–35.7)
MCV RBC AUTO: 97.4 FL (ref 79–97)
PLATELET # BLD AUTO: 175 10*3/MM3 (ref 140–450)
PMV BLD AUTO: 9.5 FL (ref 6–12)
POTASSIUM SERPL-SCNC: 4.2 MMOL/L (ref 3.5–5.2)
QTC INTERVAL: NORMAL MS
RBC # BLD AUTO: 3.11 10*6/MM3 (ref 3.77–5.28)
SODIUM SERPL-SCNC: 137 MMOL/L (ref 136–145)
WBC NRBC COR # BLD AUTO: 7.56 10*3/MM3 (ref 3.4–10.8)

## 2024-06-11 PROCEDURE — 82948 REAGENT STRIP/BLOOD GLUCOSE: CPT

## 2024-06-11 PROCEDURE — 97110 THERAPEUTIC EXERCISES: CPT

## 2024-06-11 PROCEDURE — 25010000002 ENOXAPARIN PER 10 MG: Performed by: NURSE PRACTITIONER

## 2024-06-11 PROCEDURE — 25010000002 MAGNESIUM SULFATE 2 GM/50ML SOLUTION: Performed by: NURSE PRACTITIONER

## 2024-06-11 PROCEDURE — 85027 COMPLETE CBC AUTOMATED: CPT | Performed by: NURSE PRACTITIONER

## 2024-06-11 PROCEDURE — 99024 POSTOP FOLLOW-UP VISIT: CPT | Performed by: NURSE PRACTITIONER

## 2024-06-11 PROCEDURE — 93005 ELECTROCARDIOGRAM TRACING: CPT | Performed by: THORACIC SURGERY (CARDIOTHORACIC VASCULAR SURGERY)

## 2024-06-11 PROCEDURE — 97530 THERAPEUTIC ACTIVITIES: CPT

## 2024-06-11 PROCEDURE — 93010 ELECTROCARDIOGRAM REPORT: CPT | Performed by: INTERNAL MEDICINE

## 2024-06-11 PROCEDURE — 97116 GAIT TRAINING THERAPY: CPT

## 2024-06-11 PROCEDURE — 80048 BASIC METABOLIC PNL TOTAL CA: CPT | Performed by: NURSE PRACTITIONER

## 2024-06-11 PROCEDURE — 25010000002 CALCIUM GLUCONATE 2-0.675 GM/100ML-% SOLUTION: Performed by: NURSE PRACTITIONER

## 2024-06-11 RX ORDER — CEPHALEXIN 500 MG/1
500 CAPSULE ORAL EVERY 6 HOURS SCHEDULED
Status: DISCONTINUED | OUTPATIENT
Start: 2024-06-11 | End: 2024-06-13 | Stop reason: HOSPADM

## 2024-06-11 RX ORDER — MAGNESIUM SULFATE HEPTAHYDRATE 40 MG/ML
2 INJECTION, SOLUTION INTRAVENOUS ONCE
Status: COMPLETED | OUTPATIENT
Start: 2024-06-11 | End: 2024-06-11

## 2024-06-11 RX ORDER — BUMETANIDE 1 MG/1
2 TABLET ORAL ONCE
Status: COMPLETED | OUTPATIENT
Start: 2024-06-11 | End: 2024-06-11

## 2024-06-11 RX ORDER — POTASSIUM CHLORIDE 20 MEQ/1
20 TABLET, EXTENDED RELEASE ORAL ONCE
Status: COMPLETED | OUTPATIENT
Start: 2024-06-11 | End: 2024-06-11

## 2024-06-11 RX ORDER — CALCIUM GLUCONATE 20 MG/ML
2000 INJECTION, SOLUTION INTRAVENOUS ONCE
Status: COMPLETED | OUTPATIENT
Start: 2024-06-11 | End: 2024-06-11

## 2024-06-11 RX ADMIN — CALCIUM GLUCONATE 2000 MG: 20 INJECTION, SOLUTION INTRAVENOUS at 12:25

## 2024-06-11 RX ADMIN — ASPIRIN 81 MG: 81 TABLET, COATED ORAL at 09:58

## 2024-06-11 RX ADMIN — METOPROLOL TARTRATE 25 MG: 25 TABLET, FILM COATED ORAL at 21:01

## 2024-06-11 RX ADMIN — METOPROLOL TARTRATE 25 MG: 25 TABLET, FILM COATED ORAL at 09:58

## 2024-06-11 RX ADMIN — BUMETANIDE 2 MG: 1 TABLET ORAL at 15:20

## 2024-06-11 RX ADMIN — CETIRIZINE HYDROCHLORIDE 10 MG: 10 TABLET, FILM COATED ORAL at 09:58

## 2024-06-11 RX ADMIN — PANTOPRAZOLE SODIUM 40 MG: 40 TABLET, DELAYED RELEASE ORAL at 05:18

## 2024-06-11 RX ADMIN — MAGNESIUM SULFATE HEPTAHYDRATE 2 G: 2 INJECTION, SOLUTION INTRAVENOUS at 15:33

## 2024-06-11 RX ADMIN — ENOXAPARIN SODIUM 40 MG: 100 INJECTION SUBCUTANEOUS at 09:58

## 2024-06-11 RX ADMIN — MUPIROCIN 1 APPLICATION: 20 OINTMENT TOPICAL at 09:58

## 2024-06-11 RX ADMIN — FUROSEMIDE 40 MG: 40 TABLET ORAL at 09:58

## 2024-06-11 RX ADMIN — Medication 500 ML: at 21:02

## 2024-06-11 RX ADMIN — Medication 500 ML: at 05:17

## 2024-06-11 RX ADMIN — NYSTATIN: 100000 POWDER TOPICAL at 21:02

## 2024-06-11 RX ADMIN — ENOXAPARIN SODIUM 40 MG: 100 INJECTION SUBCUTANEOUS at 21:01

## 2024-06-11 RX ADMIN — AMIODARONE HYDROCHLORIDE 200 MG: 200 TABLET ORAL at 17:47

## 2024-06-11 RX ADMIN — CEPHALEXIN 500 MG: 500 CAPSULE ORAL at 23:47

## 2024-06-11 RX ADMIN — Medication 500 ML: at 15:19

## 2024-06-11 RX ADMIN — POTASSIUM CHLORIDE 20 MEQ: 1500 TABLET, EXTENDED RELEASE ORAL at 15:20

## 2024-06-11 RX ADMIN — CEPHALEXIN 500 MG: 500 CAPSULE ORAL at 17:47

## 2024-06-11 RX ADMIN — POTASSIUM CHLORIDE 20 MEQ: 1500 TABLET, EXTENDED RELEASE ORAL at 09:58

## 2024-06-11 RX ADMIN — NYSTATIN: 100000 POWDER TOPICAL at 10:03

## 2024-06-11 RX ADMIN — AMIODARONE HYDROCHLORIDE 200 MG: 200 TABLET ORAL at 09:58

## 2024-06-11 NOTE — CASE MANAGEMENT/SOCIAL WORK
Continued Stay Note  OLIVIA Paul     Patient Name: Jeannie Ruiz  MRN: [id number]  Today's Date: 6/11/2024    Admit Date: 6/5/2024    Plan: DC Plan: Anticipate routine home with family. Nocturnal Home Oxygen per [facility] pending. CABG 6/6/2024   Discharge Plan       Row Name 06/11/24 1354       Plan    Plan DC Plan: Anticipate routine home with family. Nocturnal Home Oxygen per [facility] pending. CABG 6/6/2024    Patient/Family in Agreement with Plan yes    Provided Post Acute Provider List? N/A    Provided Post Acute Provider Quality & Resource List? N/A    Plan Comments CM reviewed chart documentation for clinical updates. Patient failed overnight oximetry but passed walking oximetry. CM spoke with patient at bedside to discuss providers for DME. Patient is agreeable to [facility] for DME needs. CM placed referral in basket and melissa Cevallos notified. Ban states [facility] cannot cover secondary to out of service area, but recommended [facility] could likely assist. CM placed referral in basket for [facility] and melissa Correa notified. CM faxed overnight oxiemtry to [facility] to scan into Epic. Orders for nocturnal oxygen requested. NP states DC held until tomorrow due to patient feeling weak and not ready to DC.CM will continue to follow for any further needs and adjust discharge plan accordingly. DC Barriers: POD 5 OHS, Cardiac monitoring, and weakness.               Expected Discharge Date and Time       Expected Discharge Date Expected Discharge Time    Jun 12, 2024           Met with patient in room     Maintain distance greater than six feet and spent less than fifteen minutes in the room.      Amanda Eduardo RN    Office Phone: [phone]  Office Cell:     [phone]

## 2024-06-11 NOTE — PLAN OF CARE
"Assessment: Jeannie Ruiz presents with functional mobility impairments which indicate the need for skilled intervention. Tolerating session today without incident. Pt able to negotiate single step with CGA using single handrail. Will continue to follow and progress bed mobility, transfers, ambulation distance, and stair negotiation as tolerated.     Plan/Recommendations:   If medically appropriate, Low Intensity Therapy recommended post-acute care - This is recommended as therapy feels this patient would require 2-3 visits per week. OP or HH would be the best option depending on patient's home bound status. Consider, if the patient has other  \"skilled\" needs such as wounds, IV antibiotics, etc. Combined with \"low intensity\" could also equate to a SNF. If patient is medically complex, consider LTAC. Pt requires no DME at discharge.     Pt desires Home with family assist and Home Health at discharge. Pt cooperative; agreeable to therapeutic recommendations and plan of care.     "

## 2024-06-11 NOTE — PROGRESS NOTES
S/P POD# 5 urgent CABG x3 with LIMA--Isabel  EF 50% (echo)    Subjective: feeling a little puny this morning          Wt is up 3 kgs from preop      Intake/Output Summary (Last 24 hours) at 6/11/2024 0749  Last data filed at 6/11/2024 0531  Gross per 24 hour   Intake 480 ml   Output 1250 ml   Net -770 ml     Temp:  [98.2 °F (36.8 °C)-99.2 °F (37.3 °C)] 98.2 °F (36.8 °C)  Heart Rate:  [79-92] 88  Resp:  [13-20] 18  BP: ()/(35-85) 123/50      Results from last 7 days   Lab Units 06/11/24  0320 06/10/24  0643 06/07/24  0500 06/07/24  0217 06/06/24  1708 06/06/24  1655 06/06/24  1533 06/06/24  1531   WBC 10*3/mm3 7.56 8.93   < > 9.10  --  10.60  --  7.98   HEMOGLOBIN g/dL 9.5* 10.1*   < > 10.7*  --  11.6*  --  9.6*   HEMOGLOBIN, POC   --   --    < >  --    < >  --    < >  --    HEMATOCRIT % 30.3* 30.9*   < > 33.2*  --  36.1  --  29.5*   HEMATOCRIT POC   --   --    < >  --    < >  --    < >  --    PLATELETS 10*3/mm3 175 168   < > 120*  --  114*  --  111*   INR   --   --   --  1.04  --  1.09  --  1.30*    < > = values in this interval not displayed.     Results from last 7 days   Lab Units 06/11/24  0320 06/08/24  0506 06/07/24  0217   CREATININE mg/dL 0.83   < > 1.00   POTASSIUM mmol/L 4.2   < > 4.1   SODIUM mmol/L 137   < > 142   MAGNESIUM mg/dL  --   --  2.6*   PHOSPHORUS mg/dL  --   --  5.3*    < > = values in this interval not displayed.     ica 1.19    Physical Exam:  Neuro intact, nad, resting in bed, margaret at bedside  Tele:  SR 80s  Diminished bases, 96% 1L  Sternotomy with moisture from breasts, suture noted in RLE thigh/ shin area, skin folds with erythema, likely yeast, left flank skin fold with erythema/ pustules from defib pad  Benign abd, + BM  Trace generalized edema    Assessment/Plan:  Principal Problem:    CAD (coronary artery disease)  Active Problems:    Non-STEMI (non-ST elevated myocardial infarction)    HTN (hypertension)    HLD (hyperlipidemia)    Type 2 diabetes mellitus, without long-term  "current use of insulin    S/P CABG x 3 with HUFFMAN per Dr. Hall 6/6/2024    Postoperative atrial fibrillation    - MV CAD including left main disease, EF 50% (echo)--s/p urgent CABG x 3 with LIMA to the dual LAD, SVG to PDA, SVG to a high marginal (Isabel)  - HTN--just off norepi  - HLD, statin intolerant--not taking Repatha d/t cost  - DM type 2--A1c 6.74  - CKD stage 3--stable  - Obesity, stage 3--BMI 39.9  - Postop ABLA, expected--watch closely  - Postop TCP, consumptive--watch closely  - Postop atrial fib--IV amio/bb, SR now    POD# 5.  She remains in sinus this morning.  On asa/bb.  No statin due to intolerance.  Mobilize, only ambulated 70 feet this yesterday-- would like her to be a little more active prior to going home.  She feels a little puny this morning and states her legs feel \"like dead weight\".  They are swollen and her weight is up from yesterday-- will give her an additional dose of diuretic this afternoon.  Nocturnal oximetry completed overnight with over 50 minutes of desaturations, will need nocturnal oxygen at discharge.     [id number] Score for Atrial Fibrillation Stroke Risk Bloominous.Purple Blue Bo  on 6/10/2024  RESULT SUMMARY:  6 points  Stroke risk was 9.7% per year in >90,000 patients (the [place] Atrial Fibrillation Cohort Study) and 13.6% risk of stroke/TIA/systemic embolism.    Routine care--as above  D/w pt/margaret, nsg, Dr. Hall  Anticipate home     ARLIN Napoles  6/11/2024  07:49 EDT  "

## 2024-06-11 NOTE — THERAPY TREATMENT NOTE
"Subjective: Pt agreeable to therapeutic plan of care.    Objective:     Bed mobility - Sitting to supine Mod-A for LE support.     Transfers - SBA. Cued for sternal precautions.     Ambulation - 50 feet + 100 feet Supervision and with rolling walker while PTA managed lines.     Stairs - Single step CGA using single handrail. Cued to avoid excessive weight bearing through handrail to maintain sternal precautions.     Phase 1 Cardiac Rehab Initiated - Acute Care    Sitting tolerance: >10min and supported  Standing tolerance: >10min and supported    Precautions:  Mid-sternal incision; avoid scapular retraction and depression.  Cardiovascular impairment post-sx; encourage energy conservation strategies.    Therapeutic Exercise: 10 reps UE and LE AROM in supported sitting    MET level equivalent: 2.0-3.0 (Unlimited sitting, ambulation on level ground <2mph, light housework)    Vitals: WNL    Pain: 0 VAS   Location:   Intervention for pain: N/A    Education: Provided education on the importance of mobility in the acute care setting, Verbal/Tactile Cues, Transfer Training, Gait Training, and Post-Op Precautions    Assessment: Jeannie Ruiz presents with functional mobility impairments which indicate the need for skilled intervention. Tolerating session today without incident. Pt able to negotiate single step with CGA using single handrail. Will continue to follow and progress bed mobility, transfers, ambulation distance, and stair negotiation as tolerated.     Plan/Recommendations:   If medically appropriate, Low Intensity Therapy recommended post-acute care - This is recommended as therapy feels this patient would require 2-3 visits per week. OP or HH would be the best option depending on patient's home bound status. Consider, if the patient has other  \"skilled\" needs such as wounds, IV antibiotics, etc. Combined with \"low intensity\" could also equate to a SNF. If patient is medically complex, consider LTAC. Pt requires " no DME at discharge.     Pt desires Home with family assist and Home Health at discharge. Pt cooperative; agreeable to therapeutic recommendations and plan of care.         Basic Mobility 6-click:  Rollin = Total, A lot = 2, A little = 3; 4 = None  Supine>Sit:   1 = Total, A lot = 2, A little = 3; 4 = None   Sit>Stand with arms:  1 = Total, A lot = 2, A little = 3; 4 = None  Bed>Chair:   1 = Total, A lot = 2, A little = 3; 4 = None  Ambulate in room:  1 = Total, A lot = 2, A little = 3; 4 = None  3-5 Steps with railin = Total, A lot = 2, A little = 3; 4 = None  Score: 20    Modified [place]: N/A = No pre-op stroke/TIA    Post-Tx Position: Supine with HOB Elevated, Alarms activated, and Call light and personal items within reach  PPE: gloves

## 2024-06-12 ENCOUNTER — TELEPHONE (OUTPATIENT)
Dept: CARDIAC SURGERY | Facility: CLINIC | Age: 74
End: 2024-06-12
Payer: MEDICARE

## 2024-06-12 LAB
ANION GAP SERPL CALCULATED.3IONS-SCNC: 11.1 MMOL/L (ref 5–15)
BUN SERPL-MCNC: 23 MG/DL (ref 8–23)
BUN/CREAT SERPL: 27.1 (ref 7–25)
CALCIUM SPEC-SCNC: 8.7 MG/DL (ref 8.6–10.5)
CHLORIDE SERPL-SCNC: 97 MMOL/L (ref 98–107)
CO2 SERPL-SCNC: 28.9 MMOL/L (ref 22–29)
CREAT SERPL-MCNC: 0.85 MG/DL (ref 0.57–1)
DEPRECATED RDW RBC AUTO: 50.8 FL (ref 37–54)
EGFRCR SERPLBLD CKD-EPI 2021: 71.5 ML/MIN/1.73
ERYTHROCYTE [DISTWIDTH] IN BLOOD BY AUTOMATED COUNT: 14.5 % (ref 12.3–15.4)
GLUCOSE BLDC GLUCOMTR-MCNC: 111 MG/DL (ref 70–105)
GLUCOSE BLDC GLUCOMTR-MCNC: 113 MG/DL (ref 70–105)
GLUCOSE BLDC GLUCOMTR-MCNC: 125 MG/DL (ref 70–105)
GLUCOSE BLDC GLUCOMTR-MCNC: 159 MG/DL (ref 70–105)
GLUCOSE SERPL-MCNC: 109 MG/DL (ref 65–99)
HCT VFR BLD AUTO: 30.8 % (ref 34–46.6)
HGB BLD-MCNC: 10.1 G/DL (ref 12–15.9)
MCH RBC QN AUTO: 31.6 PG (ref 26.6–33)
MCHC RBC AUTO-ENTMCNC: 32.8 G/DL (ref 31.5–35.7)
MCV RBC AUTO: 96.3 FL (ref 79–97)
PLATELET # BLD AUTO: 198 10*3/MM3 (ref 140–450)
PMV BLD AUTO: 8.9 FL (ref 6–12)
POTASSIUM SERPL-SCNC: 3.6 MMOL/L (ref 3.5–5.2)
QT INTERVAL: 397 MS
QT INTERVAL: 419 MS
QTC INTERVAL: 463 MS
QTC INTERVAL: 484 MS
RBC # BLD AUTO: 3.2 10*6/MM3 (ref 3.77–5.28)
SODIUM SERPL-SCNC: 137 MMOL/L (ref 136–145)
WBC NRBC COR # BLD AUTO: 6.6 10*3/MM3 (ref 3.4–10.8)

## 2024-06-12 PROCEDURE — 97530 THERAPEUTIC ACTIVITIES: CPT

## 2024-06-12 PROCEDURE — 93005 ELECTROCARDIOGRAM TRACING: CPT | Performed by: THORACIC SURGERY (CARDIOTHORACIC VASCULAR SURGERY)

## 2024-06-12 PROCEDURE — 94762 N-INVAS EAR/PLS OXIMTRY CONT: CPT

## 2024-06-12 PROCEDURE — 93010 ELECTROCARDIOGRAM REPORT: CPT | Performed by: INTERNAL MEDICINE

## 2024-06-12 PROCEDURE — 85027 COMPLETE CBC AUTOMATED: CPT | Performed by: NURSE PRACTITIONER

## 2024-06-12 PROCEDURE — 82948 REAGENT STRIP/BLOOD GLUCOSE: CPT | Performed by: THORACIC SURGERY (CARDIOTHORACIC VASCULAR SURGERY)

## 2024-06-12 PROCEDURE — 97535 SELF CARE MNGMENT TRAINING: CPT

## 2024-06-12 PROCEDURE — 80048 BASIC METABOLIC PNL TOTAL CA: CPT | Performed by: NURSE PRACTITIONER

## 2024-06-12 PROCEDURE — 25010000002 ENOXAPARIN PER 10 MG: Performed by: NURSE PRACTITIONER

## 2024-06-12 PROCEDURE — 82948 REAGENT STRIP/BLOOD GLUCOSE: CPT

## 2024-06-12 RX ORDER — POTASSIUM CHLORIDE 20 MEQ/1
40 TABLET, EXTENDED RELEASE ORAL EVERY 4 HOURS
Qty: 4 TABLET | Refills: 0 | Status: COMPLETED | OUTPATIENT
Start: 2024-06-12 | End: 2024-06-12

## 2024-06-12 RX ADMIN — METOPROLOL TARTRATE 25 MG: 25 TABLET, FILM COATED ORAL at 09:38

## 2024-06-12 RX ADMIN — AMIODARONE HYDROCHLORIDE 200 MG: 200 TABLET ORAL at 17:15

## 2024-06-12 RX ADMIN — ENOXAPARIN SODIUM 40 MG: 100 INJECTION SUBCUTANEOUS at 09:38

## 2024-06-12 RX ADMIN — POTASSIUM CHLORIDE 20 MEQ: 1500 TABLET, EXTENDED RELEASE ORAL at 15:01

## 2024-06-12 RX ADMIN — AMIODARONE HYDROCHLORIDE 200 MG: 200 TABLET ORAL at 09:37

## 2024-06-12 RX ADMIN — POTASSIUM CHLORIDE 40 MEQ: 1500 TABLET, EXTENDED RELEASE ORAL at 06:28

## 2024-06-12 RX ADMIN — CEPHALEXIN 500 MG: 500 CAPSULE ORAL at 06:28

## 2024-06-12 RX ADMIN — POTASSIUM CHLORIDE 40 MEQ: 1500 TABLET, EXTENDED RELEASE ORAL at 10:58

## 2024-06-12 RX ADMIN — NYSTATIN: 100000 POWDER TOPICAL at 21:33

## 2024-06-12 RX ADMIN — ENOXAPARIN SODIUM 40 MG: 100 INJECTION SUBCUTANEOUS at 21:33

## 2024-06-12 RX ADMIN — ASPIRIN 81 MG: 81 TABLET, COATED ORAL at 09:37

## 2024-06-12 RX ADMIN — CEPHALEXIN 500 MG: 500 CAPSULE ORAL at 17:15

## 2024-06-12 RX ADMIN — METOPROLOL TARTRATE 25 MG: 25 TABLET, FILM COATED ORAL at 21:33

## 2024-06-12 RX ADMIN — CEPHALEXIN 500 MG: 500 CAPSULE ORAL at 12:08

## 2024-06-12 RX ADMIN — PANTOPRAZOLE SODIUM 40 MG: 40 TABLET, DELAYED RELEASE ORAL at 06:28

## 2024-06-12 RX ADMIN — FUROSEMIDE 40 MG: 40 TABLET ORAL at 09:38

## 2024-06-12 RX ADMIN — Medication 500 ML: at 06:32

## 2024-06-12 RX ADMIN — CETIRIZINE HYDROCHLORIDE 10 MG: 10 TABLET, FILM COATED ORAL at 09:38

## 2024-06-12 RX ADMIN — NYSTATIN: 100000 POWDER TOPICAL at 09:38

## 2024-06-12 NOTE — TELEPHONE ENCOUNTER
Spoke with patients daughter Rowena. Discussed FMLA. Updated #6 on [employer] to 24 hrs per day, 7 days per week from 6/5/2024-8/29/2024. Re-faxed to [phone]

## 2024-06-12 NOTE — DISCHARGE SUMMARY
[facility] Discharge Summary    Date of Admission: 6/5/2024  Date of Discharge:  6/13/2024    Discharge Diagnosis:   - MV CAD including left main disease, EF 50% (echo)--s/p urgent CABG x 3 with LIMA to the dual LAD, SVG to PDA, SVG to a high marginal ([place]abel)  - NSTEMI presentation  - HTN  - HLD, statin intolerant--not taking Repatha d/t cost  - DM type 2--A1c 6.74  - CKD stage 3--stable  - Obesity, stage 3--BMI 39.9  - Postop ABLA, expected--stable  - Postop TCP, consumptive--resolved  - Postop atrial fib--IV amio/bb, SR now  - Postop acute respiratory insufficiency--nocturnal ox 6/10 showed 50 minutes of desaturation < 89%    Presenting Problem/History of Present Illness  CAD (coronary artery disease) [I25.10]  Non-STEMI (non-ST elevated myocardial infarction) [I21.4]     Hospital Course  Patient is a 74 y.o. female presented with above medical history and chest pain.  She was called by her PCP after a positive troponin blood test returned.  She underwent heart cath at [facility] in [place] and transferred to [facility] for further care and mmgt.  After having appropriate preoperative workup she was scheduled for surgery.  On 6/6/2024 she underwent CABG x 3 (LIMA to LAD, SVG to PDA, SVG to OM) by Dr. Hall.  She tolerated the procedure and was transferred to [facility].  Full details can be found in operative report.  Remained stable and was able to be extubated shortly after surgery on postop day 0.  She continued to do well overnight and was able to be de-escalated on postop day 1.  Also arterial line and [place] were removed.  On postop day 2 chest tubes and central line were removed without issue.  Her blood pressure was improved and she was started on beta-blocker.  She continued to progress with increased activity, diuresis, and weaning of supplemental oxygen.  On postop day 3 she did have an episode of atrial fibrillation with RVR.  She was started on IV Amio and beta-blocker increased.  Converted  to sinus rhythm and was transitioned to p.o. amio.  She had an overnight oximetry that showed extended periods of desaturations and will require nocturnal supplemental oxygen at discharge.  He was started on Keflex for erythema around EVH site.  She is afebrile and has normal WBC.  On POD 7she was ready for discharge.  At time of discharge she is hemodynamically stable, ambulating well, and on room air. Patient and family were provided with appropriate discharge education. They were instructed to call office with any questions or concerns.  VNA is set up for [facility].  [facility] for oxygen with rest periods.  Amiodarone being tapered as outpatient.  Lasix 40 mg daily x 14 days with KCL 20 meq daily.  Norco 5/325, #10, no refills.  Skin care evaluated skin between her breast prior to going home and recommended a skin barrier for the skin between her breasts.      Procedures Performed  Operative Note - Dr. Hall     Date of Dictation: 06/07/24     Date of Procedure: 06/06/2024     Referring Physician: Yemi Thompson MD     Preoperative diagnosis:   1.  Multivessel coronary artery disease  2.  Non-STEMI  3.  Chest pain  4.  Obesity with BMI of 39.9 kg/m2     Postoperative diagnosis:   Same     Procedure:   1.  Urgent CABG x 3 with a LIMA to the dual LAD and reverse saphenous vein graft to the PDA and a high marginal  2. EVH of the right leg          Consults:   Consults       No orders found from 5/7/2024 to 6/6/2024.            Pertinent Test Results:    Lab Results   Component Value Date    WBC 6.60 06/12/2024    HGB 10.1 (L) 06/12/2024    HCT 30.8 (L) 06/12/2024    MCV 96.3 06/12/2024     06/12/2024      Lab Results   Component Value Date    GLUCOSE 109 (H) 06/12/2024    CALCIUM 8.7 06/12/2024     06/12/2024    K 3.6 06/12/2024    CO2 28.9 06/12/2024    CL 97 (L) 06/12/2024    BUN 23 06/12/2024    CREATININE 0.85 06/12/2024    BCR 27.1 (H) 06/12/2024    ANIONGAP 11.1 06/12/2024     Lab Results   Component Value  Date    INR 1.04 06/07/2024    PROTIME 11.3 06/07/2024         Condition on Discharge:    Stable for DC home with daughter and V[facility] for oxygen needs    Vital Signs  Temp:  [97.8 °F (36.6 °C)-99 °F (37.2 °C)] 98.7 °F (37.1 °C)  Heart Rate:  [77-93] 81  Resp:  [15-22] 18  BP: (104-149)/(48-73) 117/69      Discharge Disposition  Home-Health Care Svc    Discharge Medications     Discharge Medications        New Medications        Instructions Start Date   Accu-Chek Guide Me w/Device kit   1 kit, Does not apply, Daily, Dx code: E11.9  NDC [address]-51  Bin#: [id number] Group #: [id number]  ID #: [id number]  Issuer #: ([id number])      Accu-Chek Guide test strip  Generic drug: glucose blood   1 each, Other, Daily, Use as instructed Dx code: E11.9      Accu-Chek Softclix Lancets lancets   1 each, Other, Daily, Use as instructed Dx code: E11.9      amiodarone 200 MG tablet  Commonly known as: PACERONE   Take 1 tablet by mouth 2 (Two) Times a Day With Meals for 7 days, THEN 1 tablet Daily for 21 days.   Start Date: June 13, 2024     cephalexin 500 MG capsule  Commonly known as: KEFLEX   500 mg, Oral, Every 6 Hours Scheduled      clopidogrel 75 MG tablet  Commonly known as: PLAVIX   75 mg, Oral, Daily      furosemide 40 MG tablet  Commonly known as: LASIX   40 mg, Oral, Daily   Start Date: June 14, 2024     HYDROcodone-acetaminophen 5-325 MG per tablet  Commonly known as: NORCO   1 tablet, Oral, Every 6 Hours PRN      metoprolol tartrate 25 MG tablet  Commonly known as: LOPRESSOR   25 mg, Oral, Every 12 Hours Scheduled      nystatin 152348 UNIT/GM powder  Commonly known as: MYCOSTATIN   Topical, Every 12 Hours Scheduled      potassium chloride 20 MEQ CR tablet  Commonly known as: KLOR-CON M20   20 mEq, Oral, Daily   Start Date: June 14, 2024            Continue These Medications        Instructions Start Date   aspirin 81 MG EC tablet   81 mg, Oral, Daily      coenzyme Q10 100 MG capsule   1 capsule, Oral, Daily      Flax  Seed Oil 1000 MG capsule   1 capsule, Oral, Daily      lovastatin 20 MG tablet  Commonly known as: MEVACOR   20 mg, Oral, Daily      omega-3 acid ethyl esters 1 g capsule  Commonly known as: LOVAZA   1 g, Oral      vitamin E 400 UNIT capsule   400 Units, Oral, Daily             Stop These Medications      chlorthalidone 25 MG tablet  Commonly known as: HYGROTON     isosorbide mononitrate 30 MG 24 hr tablet  Commonly known as: IMDUR     losartan 100 MG tablet  Commonly known as: COZAAR              Discharge Diet:   Heart healthy    Activity at Discharge:   Activity Instructions       Activity as Tolerated      Bathing Restrictions      No tub baths, hot tubs/jacuzzi tubs, swimming pools, rivers, lakes, oceans for 6 weeks    Type of Restriction: Bathing    Bathing Restrictions: No Tub Bath    Driving Restrictions      Do not resume driving while taking narcotic pain medication    Type of Restriction: Driving    Driving Restrictions: No Driving (Time Limited)    Length: 2 Weeks    Lifting Restrictions      Type of Restriction: Lifting    Lifting Restrictions: Lifting Restriction (Indicate Limit)    Weight Limit (Pounds): 10    Length of Lifting Restriction: 6 weeks          1. No driving for 2 weeks and off narcotic pain medications.  2. Shower daily. Clean incisions with warm water and antibacterial soap only. Do not put any lotion or ointments on incisions.  3. Ambulate for 10 minutes at least 3 times a day.  4. No heavy lifting > 10lbs until seen in office.   5. Take all medications as prescribed.     Follow-up Appointments:  Future Appointments   Date Time Provider Department Center   6/27/2024  3:00 PM Madeline Jung APRN [facility]     Additional Instructions for the Follow-ups that You Need to Schedule       Discharge Follow-up with PCP   As directed       Currently Documented PCP:    Seymour Aldridge MD    PCP Phone Number:    [phone]     Follow Up Details: in one month         Discharge Follow-up with Specialty: Cardiology; 2 Weeks   As directed      Specialty: Cardiology   Follow Up: 2 Weeks   Follow Up Details: Cardiology follow-up is on 6/20/2024 at 11:15 AM at [place] office with Ericka Chen NP        Discharge Follow-up with Specified Provider: Cardiac Surgery; 2 Weeks   As directed      To: Cardiac Surgery   Follow Up: 2 Weeks   Follow Up Details: NP postop follow-up appt is on 6/27/2024 at 3 PM        Referral to Cardiac Rehab   As directed      Call MD With Problems / Concerns    Jul 13, 2024 (Approximate)      Instructions: Call for temp >101 or any surgical wound drainage    Order Comments: Instructions: Call for temp >101 or any surgical wound drainage                 STS info: ASA/BB/Plavix for NSTEMI presentation, resumption of home Mevacor with discussion about Repatha at outpatient follow-up.      Test Results Pending at Discharge:  none

## 2024-06-12 NOTE — THERAPY TREATMENT NOTE
"Subjective: Pt agreeable to therapeutic plan of care.  Cognition: oriented to Person, Place, and Time    Objective:     Bed Mobility: Max-A   Functional Transfers: CGA     Balance: supported, static, and standing CGA  Functional Ambulation: CGA    Toileting: Mod-A  ADL Position: supported sitting and supported standing    Vitals: WNL    Pain: 4 VAS  Location: sternal  Interventions for pain: Repositioned  Education: Provided education on the importance of mobility in the acute care setting      Assessment: Jeannie Ruiz presents with ADL impairments affecting function including balance, endurance / activity tolerance, pain, and strength. Demonstrated functioning below baseline abilities indicate the need for continued skilled intervention while inpatient. Tolerating session today without incident. Will continue to follow and progress as tolerated.     Plan/Recommendations:   Low Intensity Therapy recommended post-acute care - This is recommended as therapy feels this patient would require 2-3 visits per week. OP or HH would be the best option depending on patient's home bound status. Consider, if the patient has other  \"skilled\" needs such as wounds, IV antibiotics, etc. Combined with \"low intensity\" could also equate to a SNF. If patient is medically complex, consider LTAC.. Pt requires BSC at discharge.     Pt desires Home with Home Health and Home with family assist at discharge. Pt cooperative; agreeable to therapeutic recommendations and plan of care.     Modified [place]: N/A = No pre-op stroke/TIA    Post-Tx Position: Supine with HOB Elevated  PPE: gloves    "

## 2024-06-12 NOTE — THERAPY TREATMENT NOTE
"Subjective: Pt agreeable to therapeutic plan of care. Pt is refusing to ambulate this PM after just finishing shower completion with aide. Declining ambulation due to significant fatigue. Requests PT assist to return back to bed.    Objective:     Bed mobility - Mod-A and Assist x 2 for sit>supine  Transfers - CGA with no AD, cuing necessary for sternal precautions  Ambulation - N/A or Not attempted.    Vitals: WNL    Pain: 0 VAS   Location: N/A  Intervention for pain: N/A    Education: Provided education on the importance of mobility in the acute care setting, Verbal/Tactile Cues, ADL training, and Transfer Training    Assessment: Jeannie Ruiz presents with functional mobility impairments which indicate the need for skilled intervention. Tolerating session today without incident. Pt is refusing to ambulate this PM after just finishing shower completion with aide. Declining ambulation due to significant fatigue. Requests PT assist to return back to bed. Pt req cuing and education for sternal precautions when completing transfer back to bed this PM. Able to complete transfer CGA this date. PT changing recommendations to HHPT this PM. Will continue to follow and progress as tolerated.     Plan/Recommendations:   If medically appropriate, Low Intensity Therapy recommended post-acute care - This is recommended as therapy feels this patient would require 2-3 visits per week. OP or HH would be the best option depending on patient's home bound status. Consider, if the patient has other  \"skilled\" needs such as wounds, IV antibiotics, etc. Combined with \"low intensity\" could also equate to a SNF. If patient is medically complex, consider LTAC. Pt requires no DME at discharge.     Pt desires Home with Home Health at discharge. Pt cooperative; agreeable to therapeutic recommendations and plan of care.       Modified [place]: N/A = No pre-op stroke/TIA    Post-Tx Position: Supine with HOB Elevated, Staff Present, Alarms " activated, and Call light and personal items within reach  PPE: gloves

## 2024-06-12 NOTE — PLAN OF CARE
Goal Outcome Evaluation:  Plan of Care Reviewed With: patient     Pt. Demonstrates progress w/ functional mobility this date, long ambulatory transfer in and out of bathroom w/ CGA for safety + rolling walker support, setup provided for toileting but patient requires increased assist for posterior hygiene. Educated patient and family on AE including toilet stick and bidet. Pt. Will likely require 3-in-1 commode to elevated toilet and family states they have a TTB. Recommend d/c home w/ HH therapy to address continued ADL deficits.       Anticipated Discharge Disposition (OT): home with assist, home with home health

## 2024-06-12 NOTE — DISCHARGE PLACEMENT REQUEST
"Jeannie Johnson (74 y.o. Female)       Date of Birth   1950    Social Security Number       Address   [address] [place] [facility]    Home Phone   [phone]    MRN   [id number]       Muslim   None    Marital Status                               Admission Date   6/5/24    Admission Type   Urgent    Admitting Provider   Jr Quinn Bullard MD    Attending Provider   Bienvenido Hall MD    Department, Room/Bed   [facility] CARDIOVASCULAR CARE UNIT, 2207/1       Discharge Date       Discharge Disposition       Discharge Destination                                 Attending Provider: Bienvenido Hall MD    Allergies: Statins    Isolation: None   Infection: None   Code Status: CPR    Ht: 162.6 cm (64\")   Wt: 106 kg (233 lb 7.5 oz)    Admission Cmt: None   Principal Problem: CAD (coronary artery disease) [I25.10]                   Active Insurance as of 6/5/2024       Primary Coverage       Payor Plan Insurance Group Employer/Plan Group    MEDICARE MEDICARE A & B        Payor Plan Address Payor Plan Phone Number Payor Plan Fax Number Effective Dates    [address] [phone]  3/1/2014 - None Entered    [facility] [id number]         Subscriber Name Subscriber Birth Date Member ID       JEANNIE JOHNSON 1950 [id number]               Secondary Coverage       Payor Plan Insurance Group Employer/Plan Group    [employer] [place] [employer]        Payor Plan Address Payor Plan Phone Number Payor Plan Fax Number Effective Dates    [address] [address] [phone]  4/1/2015 - None Entered    [facility] [id number]         Subscriber Name Subscriber Birth Date Member ID       JEANNIE JOHNSON 1950 [id number]                     Emergency Contacts        (Rel.) Home Phone Work Phone Mobile Phone    nathanael johnson (Spouse) -- -- [phone]    aisha johnson (Daughter) -- -- [phone]                 History & Physical        Jr Quinn Bullard MD at 06/05/24 1936        "     H&P reviewed. The patient was examined and there are no changes to the H&P.          Electronically signed by Jr Quinn Bullard MD at 06/05/24 1936   Source Note: H&P (View-Only)            Patient Care Team:  Seymour Aldridge MD as PCP - General (Internal Medicine)  Referring Provider:  Dr. Dc Thompson  Reason for consultation:  MV CAD including left main disease    Chief complaint:  chest burning    Subjective    History of Present Illness:  73 y/o woman with chest burning. Non STEMI. MVD 99% LAD and Diad 95%RCA 80% left main. EF 51% valves are normal.    Review of Systems   Cardiovascular:  Positive for chest pain.   Neurological:  Positive for headaches.        No past medical history on file.  No past surgical history on file.  No family history on file.     No medications prior to admission.       Allergies:  Patient has no allergy information on record.    Objective     Vital Signs  Temp:  [97.7 °F (36.5 °C)] 97.7 °F (36.5 °C)  Heart Rate:  [83] 83  Resp:  [16] 16  BP: (151)/(83) 151/83           Physical Exam    Results Review:   Lab Results (last 24 hours)       ** No results found for the last 24 hours. **          Cardiac cath per Dr. Thompson 6/5/2024  Hemodynamics:     LV pressure:  140/8 with a mean of 16 mm Hg         AR pressure:  139/74 with a mean of 102 mm Hg          Coronary Arteries:     Left main coronary artery:  The left main coronary artery heavily   calcified with an ostial 50% stenosis.  Catheter ventricularization with   engagement.  There has a mid segment 50% stenosis.     LAD coronary artery:  The left anterior descending coronary artery  a   calcified vessel with a 40-50% stenosis at the takeoff of the 1st diagonal   branch.  The mid LAD then has a long calcified 80% stenosis just after the   takeoff of a large 2nd diagonal branch that runs in parallel with the LAD.    The 2nd diagonal branch has a proximal 99% stenosis with HANNAH 2 flow.     Left circumflex artery:  The left  circumflex coronary artery  is a small   nondominant vessel free of significant obstructive disease.     Right coronary artery:  The right coronary artery  is a large dominant   vessel with a mid segment 95% stenosis.  The right PDA and right   posterolateral have diffuse luminal irregularities but free of significant   obstructive disease.         Conclusion:  Coronary artery disease including a heavily calcified left   main, critical stenosis of the LAD and large 1st diagonal that runs in   parallel, and the dominant right coronary artery with known preserved LV   function by echocardiogram       Assessment & Plan      CAD (coronary artery disease)      Assessment & Plan    - MV CAD including left main disease, EF 50% (echo)--surgical workup ongoing  - HTN  - HLD, statin intolerant--not taking Repatha d/t cost  - DM type 2  - CKD stage 3  - Obesity, stage 3    Thank you for allowing us to participate in the care of this patient.      Plan CABG in AM. I discussed all this and all the risks with the patient    Madeline HUMPHREY Erich, ARLIN  06/05/24  17:11 EDT    **all problems new to this examiner  **EKG and CXR independently reviewed and interpreted            Electronically signed by Jr Quinn Bullard MD at 06/05/24 1935                 Jr Quinn Bullard MD at 06/05/24 1709            Patient Care Team:  Seymour Aldridge MD as PCP - General (Internal Medicine)  Referring Provider:  Dr. Dc Thompson  Reason for consultation:  MV CAD including left main disease    Chief complaint:  chest burning    Subjective    History of Present Illness:  75 y/o woman with chest burning. Non STEMI. MVD 99% LAD and Diad 95%RCA 80% left main. EF 51% valves are normal.    Review of Systems   Cardiovascular:  Positive for chest pain.   Neurological:  Positive for headaches.        No past medical history on file.  No past surgical history on file.  No family history on file.     No medications prior to admission.        Allergies:  Patient has no allergy information on record.    Objective     Vital Signs  Temp:  [97.7 °F (36.5 °C)] 97.7 °F (36.5 °C)  Heart Rate:  [83] 83  Resp:  [16] 16  BP: (151)/(83) 151/83           Physical Exam    Results Review:   Lab Results (last 24 hours)       ** No results found for the last 24 hours. **          Cardiac cath per Dr. Thompson 6/5/2024  Hemodynamics:     LV pressure:  140/8 with a mean of 16 mm Hg         AR pressure:  139/74 with a mean of 102 mm Hg          Coronary Arteries:     Left main coronary artery:  The left main coronary artery heavily   calcified with an ostial 50% stenosis.  Catheter ventricularization with   engagement.  There has a mid segment 50% stenosis.     LAD coronary artery:  The left anterior descending coronary artery  a   calcified vessel with a 40-50% stenosis at the takeoff of the 1st diagonal   branch.  The mid LAD then has a long calcified 80% stenosis just after the   takeoff of a large 2nd diagonal branch that runs in parallel with the LAD.    The 2nd diagonal branch has a proximal 99% stenosis with HANNAH 2 flow.     Left circumflex artery:  The left circumflex coronary artery  is a small   nondominant vessel free of significant obstructive disease.     Right coronary artery:  The right coronary artery  is a large dominant   vessel with a mid segment 95% stenosis.  The right PDA and right   posterolateral have diffuse luminal irregularities but free of significant   obstructive disease.         Conclusion:  Coronary artery disease including a heavily calcified left   main, critical stenosis of the LAD and large 1st diagonal that runs in   parallel, and the dominant right coronary artery with known preserved LV   function by echocardiogram       Assessment & Plan      CAD (coronary artery disease)      Assessment & Plan    - MV CAD including left main disease, EF 50% (echo)--surgical workup ongoing  - HTN  - HLD, statin intolerant--not taking Repatha d/t  cost  - DM type 2  - CKD stage 3  - Obesity, stage 3    Thank you for allowing us to participate in the care of this patient.      Plan CABG in AM. I discussed all this and all the risks with the patient    Madeline Jung, ARLIN  06/05/24  17:11 EDT    **all problems new to this examiner  **EKG and CXR independently reviewed and interpreted            Electronically signed by Jr Quinn Bullard MD at 06/05/24 1633

## 2024-06-12 NOTE — DISCHARGE INSTR - OTHER ORDERS
Please call [facility] office when you arrive home to have your home oxygen concentrator delivered. You may contact them at [phone].

## 2024-06-12 NOTE — PROGRESS NOTES
"S/P POD# 6 urgent CABG x3 with LIMA--Herbni  EF 50% (echo)    Subjective:  she is having \"hot flashes\".  She is wanting to remove surgical bra for a little bit.  She is concerned about the bruising on her RLE from [facility].  She is also not sleeping d/t being a side sleeper.    Afebrile   Nocturnal oximetry 6/10--she needs O2 with sleep  Wt is up 3 kgs from preop      Intake/Output Summary (Last 24 hours) at 6/12/2024 1016  Last data filed at 6/12/2024 0847  Gross per 24 hour   Intake 956 ml   Output 3500 ml   Net -2544 ml     Temp:  [98 °F (36.7 °C)-99 °F (37.2 °C)] 98.4 °F (36.9 °C)  Heart Rate:  [78-98] 92  Resp:  [18-19] 18  BP: (103-144)/(51-71) 126/59      Results from last 7 days   Lab Units 06/12/24  0352 06/11/24  0320 06/07/24  0500 06/07/24  0217 06/06/24  1708 06/06/24  1655 06/06/24  1533 06/06/24  1531   WBC 10*3/mm3 6.60 7.56   < > 9.10  --  10.60  --  7.98   HEMOGLOBIN g/dL 10.1* 9.5*   < > 10.7*  --  11.6*  --  9.6*   HEMOGLOBIN, POC   --   --    < >  --    < >  --    < >  --    HEMATOCRIT % 30.8* 30.3*   < > 33.2*  --  36.1  --  29.5*   HEMATOCRIT POC   --   --    < >  --    < >  --    < >  --    PLATELETS 10*3/mm3 198 175   < > 120*  --  114*  --  111*   INR   --   --   --  1.04  --  1.09  --  1.30*    < > = values in this interval not displayed.     Results from last 7 days   Lab Units 06/12/24  0352 06/08/24  0506 06/07/24  0217   CREATININE mg/dL 0.85   < > 1.00   POTASSIUM mmol/L 3.6   < > 4.1   SODIUM mmol/L 137   < > 142   MAGNESIUM mg/dL  --   --  2.6*   PHOSPHORUS mg/dL  --   --  5.3*    < > = values in this interval not displayed.     Physical Exam:  Neuro intact, nad, resting in bed, margaret at bedside  Tele:  SR 90s  Diminished bases, 96% 2L  Sternotomy with moisture from breasts and appearance of candida rash, suture noted in RLE thigh/ shin area, skin folds with erythema, likely yeast, left flank skin fold with erythema-improved  Benign abd, + BM  Trace generalized " edema    Assessment/Plan:  Principal Problem:    CAD (coronary artery disease)  Active Problems:    Non-STEMI (non-ST elevated myocardial infarction)    HTN (hypertension)    HLD (hyperlipidemia)    Type 2 diabetes mellitus, without long-term current use of insulin    S/P CABG x 3 with HUFFMAN per Dr. Hall 6/6/2024    Postoperative atrial fibrillation    - MV CAD including left main disease, EF 50% (echo)--s/p urgent CABG x 3 with LIMA to the dual LAD, SVG to PDA, SVG to a high marginal (Isabel)  - NSTEMI presentation  - HTN--just off norepi  - HLD, statin intolerant--not taking Repatha d/t cost  - DM type 2--A1c 6.74  - CKD stage 3--stable  - Obesity, stage 3--BMI 39.9  - Postop ABLA, expected--watch closely  - Postop TCP, consumptive--watch closely  - Postop atrial fib--IV amio/bb, SR now  - Postop acute respiratory insufficiency--nocturnal ox 6/10 showed 50 minutes of desaturation < 89%    POD# 6.  She is maintaining sinus.  We discussed that her hot flashes could be an inflammatory response from CPB.  Melatonin added for insomnia prn.  Her RLE bruising is unchanged from Monday.  Keflex started yesterday d/t erythema around EVH site near calf on RLE.  Erythema slightly improved.  On asa/bb.  No statin due to intolerance.  Skin care team following.  Instructions given for antibacterial soap to sternotomy and dry gauze pad to wick moisture from breast tissue.  Nystatin powder ordered.  Mobilize.  Nocturnal oximetry reviewed, O2 ordered.  Pt is wanting to talk with care mmgt about SNIF, they were notified.    [id number] Score for Atrial Fibrillation Stroke Risk [email or web address]  on 6/10/2024  RESULT SUMMARY:  6 points  Stroke risk was 9.7% per year in >90,000 patients (the [place] Atrial Fibrillation Cohort Study) and 13.6% risk of stroke/TIA/systemic embolism.    Routine care--as above  D/w pt/margaret, nsg, Dr. Hall, Dr. Askew   Anticipate home      ARLIN Grajeda  6/12/2024  10:16 EDT

## 2024-06-12 NOTE — PLAN OF CARE
"Assessment: Jeannie Ruiz presents with functional mobility impairments which indicate the need for skilled intervention. Tolerating session today without incident. Pt is refusing to ambulate this PM after just finishing shower completion with aide. Declining ambulation due to significant fatigue. Requests PT assist to return back to bed. Pt req cuing and education for sternal precautions when completing transfer back to bed this PM. Able to complete transfer CGA this date. PT changing recommendations to HHPT this PM. Will continue to follow and progress as tolerated.     Plan/Recommendations:   If medically appropriate, Low Intensity Therapy recommended post-acute care - This is recommended as therapy feels this patient would require 2-3 visits per week. OP or HH would be the best option depending on patient's home bound status. Consider, if the patient has other  \"skilled\" needs such as wounds, IV antibiotics, etc. Combined with \"low intensity\" could also equate to a SNF. If patient is medically complex, consider LTAC. Pt requires no DME at discharge.     Pt desires Home with Home Health at discharge. Pt cooperative; agreeable to therapeutic recommendations and plan of care.   "

## 2024-06-12 NOTE — TELEPHONE ENCOUNTER
Patient's daughter called regarding FMLA paper work for herself.  She states she needs to discuss something re: the paper work with you.  Please call back at [phone].

## 2024-06-13 ENCOUNTER — READMISSION MANAGEMENT (OUTPATIENT)
Dept: CALL CENTER | Facility: HOSPITAL | Age: 74
End: 2024-06-13
Payer: MEDICARE

## 2024-06-13 VITALS
HEART RATE: 87 BPM | OXYGEN SATURATION: 94 % | SYSTOLIC BLOOD PRESSURE: 120 MMHG | BODY MASS INDEX: 39.71 KG/M2 | RESPIRATION RATE: 22 BRPM | WEIGHT: 232.6 LBS | DIASTOLIC BLOOD PRESSURE: 62 MMHG | HEIGHT: 64 IN | TEMPERATURE: 98.2 F

## 2024-06-13 LAB
GLUCOSE BLDC GLUCOMTR-MCNC: 100 MG/DL (ref 70–105)
GLUCOSE BLDC GLUCOMTR-MCNC: 102 MG/DL (ref 70–105)
GLUCOSE BLDC GLUCOMTR-MCNC: 122 MG/DL (ref 70–105)

## 2024-06-13 PROCEDURE — 97116 GAIT TRAINING THERAPY: CPT

## 2024-06-13 PROCEDURE — 82948 REAGENT STRIP/BLOOD GLUCOSE: CPT

## 2024-06-13 PROCEDURE — 97110 THERAPEUTIC EXERCISES: CPT

## 2024-06-13 PROCEDURE — 82948 REAGENT STRIP/BLOOD GLUCOSE: CPT | Performed by: THORACIC SURGERY (CARDIOTHORACIC VASCULAR SURGERY)

## 2024-06-13 PROCEDURE — 97535 SELF CARE MNGMENT TRAINING: CPT

## 2024-06-13 PROCEDURE — 25010000002 ENOXAPARIN PER 10 MG: Performed by: NURSE PRACTITIONER

## 2024-06-13 PROCEDURE — 97530 THERAPEUTIC ACTIVITIES: CPT

## 2024-06-13 RX ORDER — FUROSEMIDE 40 MG/1
40 TABLET ORAL DAILY
Qty: 14 TABLET | Refills: 0 | Status: SHIPPED | OUTPATIENT
Start: 2024-06-14 | End: 2024-06-17

## 2024-06-13 RX ORDER — AMIODARONE HYDROCHLORIDE 200 MG/1
TABLET ORAL
Qty: 35 TABLET | Refills: 1 | Status: SHIPPED | OUTPATIENT
Start: 2024-06-13 | End: 2024-08-16

## 2024-06-13 RX ORDER — NYSTATIN 100000 [USP'U]/G
POWDER TOPICAL EVERY 12 HOURS SCHEDULED
Qty: 15 G | Refills: 0 | Status: SHIPPED | OUTPATIENT
Start: 2024-06-13

## 2024-06-13 RX ORDER — HYDROCODONE BITARTRATE AND ACETAMINOPHEN 5; 325 MG/1; MG/1
1 TABLET ORAL EVERY 6 HOURS PRN
Qty: 10 TABLET | Refills: 0 | Status: SHIPPED | OUTPATIENT
Start: 2024-06-13 | End: 2024-06-16

## 2024-06-13 RX ORDER — BLOOD SUGAR DIAGNOSTIC
1 STRIP MISCELLANEOUS DAILY
Qty: 100 EACH | Refills: 2 | Status: SHIPPED | OUTPATIENT
Start: 2024-06-13

## 2024-06-13 RX ORDER — BLOOD-GLUCOSE METER
1 EACH MISCELLANEOUS DAILY
Qty: 1 KIT | Refills: 0 | Status: SHIPPED | OUTPATIENT
Start: 2024-06-13

## 2024-06-13 RX ORDER — BLOOD SUGAR DIAGNOSTIC
1 STRIP MISCELLANEOUS DAILY
Qty: 100 EACH | Refills: 2 | Status: SHIPPED | OUTPATIENT
Start: 2024-06-13 | End: 2024-06-13

## 2024-06-13 RX ORDER — POTASSIUM CHLORIDE 20 MEQ/1
20 TABLET, EXTENDED RELEASE ORAL DAILY
Qty: 14 TABLET | Refills: 0 | Status: SHIPPED | OUTPATIENT
Start: 2024-06-14 | End: 2024-06-17

## 2024-06-13 RX ORDER — LANCETS
1 EACH MISCELLANEOUS DAILY
Qty: 100 EACH | Refills: 2 | Status: SHIPPED | OUTPATIENT
Start: 2024-06-13

## 2024-06-13 RX ORDER — CEPHALEXIN 500 MG/1
500 CAPSULE ORAL EVERY 6 HOURS SCHEDULED
Qty: 21 CAPSULE | Refills: 0 | Status: SHIPPED | OUTPATIENT
Start: 2024-06-13 | End: 2024-06-17

## 2024-06-13 RX ORDER — CLOPIDOGREL BISULFATE 75 MG/1
75 TABLET ORAL DAILY
Qty: 30 TABLET | Refills: 3 | Status: SHIPPED | OUTPATIENT
Start: 2024-06-13

## 2024-06-13 RX ORDER — LANCETS
1 EACH MISCELLANEOUS DAILY
Qty: 100 EACH | Refills: 2 | Status: SHIPPED | OUTPATIENT
Start: 2024-06-13 | End: 2024-06-13

## 2024-06-13 RX ADMIN — CEPHALEXIN 500 MG: 500 CAPSULE ORAL at 06:40

## 2024-06-13 RX ADMIN — FUROSEMIDE 40 MG: 40 TABLET ORAL at 08:33

## 2024-06-13 RX ADMIN — METOPROLOL TARTRATE 25 MG: 25 TABLET, FILM COATED ORAL at 08:33

## 2024-06-13 RX ADMIN — CETIRIZINE HYDROCHLORIDE 10 MG: 10 TABLET, FILM COATED ORAL at 08:33

## 2024-06-13 RX ADMIN — PANTOPRAZOLE SODIUM 40 MG: 40 TABLET, DELAYED RELEASE ORAL at 06:40

## 2024-06-13 RX ADMIN — POTASSIUM CHLORIDE 20 MEQ: 1500 TABLET, EXTENDED RELEASE ORAL at 08:33

## 2024-06-13 RX ADMIN — CEPHALEXIN 500 MG: 500 CAPSULE ORAL at 13:59

## 2024-06-13 RX ADMIN — AMIODARONE HYDROCHLORIDE 200 MG: 200 TABLET ORAL at 08:32

## 2024-06-13 RX ADMIN — AMIODARONE HYDROCHLORIDE 200 MG: 200 TABLET ORAL at 17:55

## 2024-06-13 RX ADMIN — NYSTATIN: 100000 POWDER TOPICAL at 08:34

## 2024-06-13 RX ADMIN — CEPHALEXIN 500 MG: 500 CAPSULE ORAL at 17:55

## 2024-06-13 RX ADMIN — ASPIRIN 81 MG: 81 TABLET, COATED ORAL at 08:33

## 2024-06-13 RX ADMIN — CEPHALEXIN 500 MG: 500 CAPSULE ORAL at 00:20

## 2024-06-13 RX ADMIN — ENOXAPARIN SODIUM 40 MG: 100 INJECTION SUBCUTANEOUS at 08:33

## 2024-06-13 NOTE — PLAN OF CARE
Subjective: Pt agreeable to therapeutic plan of care. She's anxious about discharging today, not feeling confident about it.      PT spent increased time with pt and dtr discussing concerns, providing reassurance, and recommendations for safe transition home.     Objective:   CG training - PT demos don/doff gait belt, appropriate sizing for walker height, and safe assisting techniques for bed mobility and stairs.   Bed mobility - Mod-A supine to sit with assist for lifting trunk.   Transfers - SBA and with rolling walker sit to/from stand from recliner and hospital bed. PT simulates car transfer with demo for safe technique.   Ambulation - 110 feet SBA and with rolling walker for first 50', last 60' trialed without walker with CGA provided. Pt reports feeling off balance and is noted to have decreased speed and decreased step length without walker. Pt recommends walker use at home for now until balance and confidence improve.   Stairs - 3 steps with no rail, PT providing hand held assist and cues for sequencing. Dtr present and observing. PT provides recs for safe assisting techniques for stairs; pt and dtr verbalize understanding.

## 2024-06-13 NOTE — DISCHARGE INSTRUCTIONS
Post Coronary Artery Bypass Graft (CABG) Surgery Discharge Instructions    To Accompany Standard Discharge Form      Call these physicians to schedule a follow up visit:    Cardiothoracic Surgery Phone:  [phone] Fax:  [phone] Address:  [address] [place] [address] [place] [address]       Wear your Heart Hugger continuously until your follow-up appointment with Cardiothoracic Surgery.  No lifting over 10 pounds (gallon of milk) for six (6) weeks after surgery.  No driving for four (4) weeks.    Showers are to be taken every day.    Clean all incisions with fragrance free liquid anti-bacterial soap.  No bar soap because bacteria grows on bar soap.  Please do not use any lotions or powders on incisions.  No tub baths until incisions are completely healed.   Avoid sitting for long periods of time.  Try to stand and walk every 1-2 hours when you travel or are sitting for a long time.   If your legs are swollen, elevate your legs on pillows above the level of your heart.    Rest as needed during the day, but do not take more than 2 naps during the day.   Extra naps may interfere with your ability to sleep at night.  Take your pain medications as ordered, particularly at night.    Avoid straining or bending over with your head down.    You may participate in sex when you can walk up 2 flights of stairs without shortness of breath.   Avoid positions that put pressure on your upper arms.  Do not have sex after a heavy meal or if you are tired.  Weigh yourself daily.   Weight gain can be a sign of extra fluid in your lungs and body.   Call your care giver if you have gained 2-3 pounds in one day.  Stay away from large crowds and people with colds, influenzas, or COVID.  Keep your incision clean.  Do not hold animals or small children or allow them near your incision sites to prevent infection.    Your physician will release you to return to work after your surgery.   Usually patients may return to  work six (6) weeks after surgery.    If you smoke, now is a good time to quit.  Nicotine can cause increased narrowing of the new grafts.  Call the doctor if you have any of the following:    Incisions that are red, swollen, or increasingly tender or have pus coming from them  A temperature over 101.0 degrees  Sudden trouble breathing. This could be a sign of a clot in your lung  A “pop” in your chest bone or the incision is   Increasing chest pain or pressure radiating to your neck, jaw, or arm. These are signs of a heart attack.  Weakness on one side of your body or difficulty speaking. These are signs of a stroke.    In case of an emergency, contact your physician or nurse practitioner. They will direct you regarding whether to go to the emergency room or call 911.

## 2024-06-13 NOTE — THERAPY TREATMENT NOTE
Subjective: Pt agreeable to therapeutic plan of care. She's anxious about discharging today, not feeling confident about it.      PT spent increased time with pt and dtr discussing concerns, providing reassurance, and recommendations for safe transition home.     Objective:   CG training - PT demos don/doff gait belt, appropriate sizing for walker height, and safe assisting techniques for bed mobility and stairs.   Bed mobility - Mod-A supine to sit with assist for lifting trunk.   Transfers - SBA and with rolling walker sit to/from stand from recliner and hospital bed. PT simulates car transfer with demo for safe technique.   Ambulation - 110 feet SBA and with rolling walker for first 50', last 60' trialed without walker with CGA provided. Pt reports feeling off balance and is noted to have decreased speed and decreased step length without walker. Pt recommends walker use at home for now until balance and confidence improve.   Stairs - 3 steps with no rail, PT providing hand held assist and cues for sequencing. Dtr present and observing. PT provides recs for safe assisting techniques for stairs; pt and dtr verbalize understanding.   Sitting balance - independent at EOB.   Standing balance - supervision for static unsupported standing, CGA for dynamic standing balance unsupported.     Therapeutic Exercise - reviewed home exercises for BUE and BLE. Answered pt and dtrs questions regarding reps and frequency.     Vitals: WNL /71 initially, after stairs and gait /60. Sats stable 96-97% at rest and with activity on RA. HR ~97 bpm after stair trial.     Phase 1 Cardiac Rehab Initiated - Acute Care    Cardiac Level III Activities  Sitting tolerance: >10min and supported  Standing tolerance: 5-10min and supported    Precautions: pt verbalizes 3/3 precautions, and demos good understanding by adhering to them during mobility tasks.   Mid-sternal incision; avoid scapular retraction and depression.  Cardiovascular  "impairment post-sx; encourage energy conservation strategies.    MET level equivalent: 1.4-2.0 (Self care ADLs in sitting / slow ambulation in room, light intensity activities)      Pain: 1 VAS   Location: L chest/ sternal  Intervention for pain: Repositioned, Increased Activity, and Therapeutic Presence    Education: Provided education on the importance of mobility in the acute care setting, Verbal/Tactile Cues, Transfer Training, Gait Training, and Energy conservation strategies    Assessment: Jeannie Ruiz presents with endurance, balance, and functional mobility impairments which indicate the need for skilled intervention. Pt does well with mobility task despite her concerns. Pt is progressing well toward all goals today.  Tolerating session today without incident. Anticipate DC home today but PT will continue to follow in case DC is delayed.      Plan/Recommendations:   If medically appropriate, Low Intensity Therapy recommended post-acute care - This is recommended as therapy feels this patient would require 2-3 visits per week. OP or HH would be the best option depending on patient's home bound status. Consider, if the patient has other  \"skilled\" needs such as wounds, IV antibiotics, etc. Combined with \"low intensity\" could also equate to a SNF. If patient is medically complex, consider LTAC. Pt requires no DME at discharge.     Pt desires Home with Home Health and Home with family assist at discharge. Pt cooperative; agreeable to therapeutic recommendations and plan of care.     Basic Mobility 6-click:  Rollin = Total, A lot = 2, A little = 3; 4 = None  Supine>Sit:   1 = Total, A lot = 2, A little = 3; 4 = None   Sit>Stand with arms:  1 = Total, A lot = 2, A little = 3; 4 = None  Bed>Chair:   1 = Total, A lot = 2, A little = 3; 4 = None  Ambulate in room:  1 = Total, A lot = 2, A little = 3; 4 = None  3-5 Steps with railin = Total, A lot = 2, A little = 3; 4 = None  Score: 20    Modified " Adeel: N/A = No pre-op stroke/TIA    Post-Tx Position: Up in Chair, Alarms activated, and Call light and personal items within reach, dtr present   PPE: gloves

## 2024-06-13 NOTE — CONSULTS
"Nutrition Services    Patient Name: Jeannie Ruiz  YOB: 1950  MRN: [id number]  Admission date: 6/5/2024    NUTRITION SCREENING      Encounter Information: Pt being assessed for LOS x 8 days. Pt admitted to [facility] with \"chest burning,\" non STEMI. Admitting dx of CAD. Plan for discharge today.       PO Diet: Diet: Cardiac, Diabetic; Healthy Heart (2-3 Na+); Consistent Carbohydrate; Fluid Consistency: Thin (IDDSI 0)   PO Supplements: -   PO Intake:  %       Labs (reviewed below): Reviewed, management per attending.        GI Function:  + BM on 6/12       Skin: Intact        Weight Hx Review: 6/13: 232# - wt method not recorded; ~2% wt gain since admission.    No prior wt hx to review.       Nutrition Intervention: Continue current diet. Encourage good PO intake.       Results from last 7 days   Lab Units 06/12/24  0352 06/11/24  0320 06/10/24  2003 06/10/24  0643   SODIUM mmol/L 137 137  --  136   POTASSIUM mmol/L 3.6 4.2 4.2 3.6   CHLORIDE mmol/L 97* 100  --  97*   CO2 mmol/L 28.9 27.0  --  28.6   BUN mg/dL 23 25*  --  20   CREATININE mg/dL 0.85 0.83  --  0.76   CALCIUM mg/dL 8.7 8.4*  --  8.2*   GLUCOSE mg/dL 109* 111*  --  119*     Results from last 7 days   Lab Units 06/12/24  0352 06/07/24  0500 06/07/24  0217   MAGNESIUM mg/dL  --   --  2.6*   PHOSPHORUS mg/dL  --   --  5.3*   HEMOGLOBIN g/dL 10.1*   < > 10.7*   HEMOGLOBIN, POC   --    < >  --    HEMATOCRIT % 30.8*   < > 33.2*   HEMATOCRIT POC   --    < >  --     < > = values in this interval not displayed.     COVID19   Date Value Ref Range Status   06/05/2024 Not Detected Not Detected - Ref. Range Final     Lab Results   Component Value Date    HGBA1C 6.74 (H) 06/05/2024       RD to follow up per protocol.    Electronically signed by:  Ban Owusu RD  06/13/24 14:18 EDT  "

## 2024-06-13 NOTE — NURSING NOTE
WOCN note:    74 yr old female admitted 6/5/24 with CAD and underwent an urgent CABG x3 on 6/6/24. WOCN re-consulted for skin irritation on both sides of the distal sternal incision.   Patient presents with two areas of epidermal stripping from adhesive removal. The wounds measure approximately 1-1.5cm. There is no erythema or exudate noted. The patient has large pendulous breasts and is wearing a surgical bra. There is dry gauze tucked between the breasts to assist with wicking moisture. Skin prep barrier spray was applied to the skin injuries and allowed to dry.   The denuded area in the left flank fold was assessed. The skin is dry with some flaking but no erythema or rash is noted. Instructed daughter on skin care and moisture control. Patient is anticipating discharge today.

## 2024-06-13 NOTE — PROGRESS NOTES
S/P POD# 6 urgent CABG x3 with LIMA--Herbni  EF 50% (echo)    Subjective:  feeling some better    Up walking with therapy, worked on stairs too  Nocturnal oximetry 6/12--she needs O2 with sleep  Wt is up 2 kgs from preop      Intake/Output Summary (Last 24 hours) at 6/13/2024 1021  Last data filed at 6/13/2024 0700  Gross per 24 hour   Intake 1150 ml   Output 2075 ml   Net -925 ml     Temp:  [97.8 °F (36.6 °C)-99 °F (37.2 °C)] 98.7 °F (37.1 °C)  Heart Rate:  [77-93] 85  Resp:  [15-22] 15  BP: (104-149)/(48-78) 129/73      Results from last 7 days   Lab Units 06/12/24  0352 06/11/24  0320 06/07/24  0500 06/07/24  0217 06/06/24  1708 06/06/24  1655 06/06/24  1533 06/06/24  1531   WBC 10*3/mm3 6.60 7.56   < > 9.10  --  10.60  --  7.98   HEMOGLOBIN g/dL 10.1* 9.5*   < > 10.7*  --  11.6*  --  9.6*   HEMOGLOBIN, POC   --   --    < >  --    < >  --    < >  --    HEMATOCRIT % 30.8* 30.3*   < > 33.2*  --  36.1  --  29.5*   HEMATOCRIT POC   --   --    < >  --    < >  --    < >  --    PLATELETS 10*3/mm3 198 175   < > 120*  --  114*  --  111*   INR   --   --   --  1.04  --  1.09  --  1.30*    < > = values in this interval not displayed.     Results from last 7 days   Lab Units 06/12/24  0352 06/08/24  0506 06/07/24  0217   CREATININE mg/dL 0.85   < > 1.00   POTASSIUM mmol/L 3.6   < > 4.1   SODIUM mmol/L 137   < > 142   MAGNESIUM mg/dL  --   --  2.6*   PHOSPHORUS mg/dL  --   --  5.3*    < > = values in this interval not displayed.     Physical Exam:  Neuro intact, nad, up walking, margaret at bedside  Tele:  SR 90s  Diminished bases, 96% 2L  Sternotomy with moisture from breasts and appearance of candida rash, skin folds with erythema, likely yeast, left flank skin fold with erythema-improved  Benign abd, + BM  Trace generalized edema    Assessment/Plan:  Principal Problem:    CAD (coronary artery disease)  Active Problems:    Non-STEMI (non-ST elevated myocardial infarction)    HTN (hypertension)    HLD (hyperlipidemia)    Type 2  diabetes mellitus, without long-term current use of insulin    S/P CABG x 3 with HUFFMAN per Dr. Hall 6/6/2024    Postoperative atrial fibrillation    - MV CAD including left main disease, EF 50% (echo)--s/p urgent CABG x 3 with LIMA to the dual LAD, SVG to PDA, SVG to a high marginal (Isabel)  - NSTEMI presentation  - HTN--norepi postop, stable now  - HLD, statin intolerant--on low dose Mevacor at home, not taking Repatha d/t cost  - DM type 2--A1c 6.74  - CKD stage 3--stable  - Obesity, stage 3--BMI 39.9  - Postop ABLA, expected--watch closely  - Postop TCP, consumptive--watch closely  - Postop atrial fib--IV amio/bb, SR now  - Postop acute respiratory insufficiency--nocturnal ox 6/12 showed 9 minutes of desaturation < 89%    POD# 7.  Ready for DC home today.  Her RLE bruising is unchanged from Monday.  Continue Keflex started d/t erythema around EVH site near calf on RLE.  Erythema improved.  On asa/bb.  Restart home statin she had been taking.  Add Plavix d/t NSTEMI presentation.  Skin care team following.  Instructions given for antibacterial soap to sternotomy and dry gauze pad to wick moisture from breast tissue.  Nystatin powder ordered.  Mobilize.  Nocturnal oximetry reviewed, O2 ordered.  [facility].  [facility] for home O2.  No anticoagulation per Dr. Hall d/t brevity of PAF.    [id number] Score for Atrial Fibrillation Stroke Risk [email or web address]  on 6/10/2024  RESULT SUMMARY:  6 points  Stroke risk was 9.7% per year in >90,000 patients (the [place] Atrial Fibrillation Cohort Study) and 13.6% risk of stroke/TIA/systemic embolism.    Routine care--as above  D/w pt/margaret, nsg, Dr. Askew   Anticipate home today      ARLIN Grajeda  6/13/2024  10:21 EDT

## 2024-06-13 NOTE — CASE MANAGEMENT/SOCIAL WORK
Case Management Discharge Note      Final Note: Routine home w/ [facility].    Provided Post Acute Provider List?: Yes  Post Acute Provider List: Home Health  Provided Post Acute Provider Quality & Resource List?: Yes  Post Acute Provider Quality and Resource List: Home Health  Delivered To: Patient    Selected Continued Care - Admitted Since 6/5/2024         Durable Medical Equipment Coordination complete.      Service Provider Selected Services Address Phone Fax Patient Preferred    [facility] [address], [facility] [place] [address] [phone] --              Home Medical Care Coordination complete.      Service Provider Selected Services Address Phone Fax Patient Preferred    [facility] ,  Home Nursing [address], [address], [facility] [address] [phone] --               Transportation Services  Private: Car    Final Discharge Disposition Code: 01 - home or self-care        Continued Stay Note   [facility]     Patient Name: Jeannie Ruiz  MRN: [id number]  Today's Date: 6/13/2024    Admit Date: 6/5/2024    Plan: DC plan: Return home w/  and [facility] (accepted, oders entered).   Discharge Plan       Row Name 06/13/24 1356       Plan    Plan DC plan: Return home w/  and [facility] (accepted, oders entered).    Patient/Family in Agreement with Plan yes    Plan Comments [facility] liaison Rowena notified of patient discharge, HHC order entered.             Expected Discharge Date and Time       Expected Discharge Date Expected Discharge Time    Jun 13, 2024        Talita Hernadnez RN     Office Phone [phone]

## 2024-06-13 NOTE — PLAN OF CARE
Goal Outcome Evaluation:            Patient discharging today, Staying with daughter, Discharge education complete

## 2024-06-13 NOTE — CASE MANAGEMENT/SOCIAL WORK
Continued Stay Note  OLIVIA Paul     Patient Name: Jeannie Ruiz  MRN: [id number]  Today's Date: 6/13/2024    Admit Date: 6/5/2024  Plan: DC plan: Home with [facility] (accepted). Nocturnal O2 w/ [facility]. CABG 6/6/25024.   Discharge Plan       Row Name 06/13/24 0833       Plan    Plan DC plan: Home with [facility] (accepted). Nocturnal O2 w/ [facility]. CABG 6/6/25024.    Patient/Family in Agreement with Plan yes    Plan Comments Liaison Rowena confirmed patient accepted with [facility].             Expected Discharge Date and Time       Expected Discharge Date Expected Discharge Time    Jun 13, 2024        Talita Hernandez RN      Office Phone [phone]

## 2024-06-14 ENCOUNTER — TELEPHONE (OUTPATIENT)
Dept: DIABETES SERVICES | Facility: HOSPITAL | Age: 74
End: 2024-06-14
Payer: MEDICARE

## 2024-06-14 NOTE — OUTREACH NOTE
Prep Survey      Flowsheet Row Responses   Nondenominational facility patient discharged from? [facility]   Is LACE score < 7 ? No   Eligibility Readm Mgmt   Discharge diagnosis CAD, Urgent CABG x 3 using right leg vein harvest   Does the patient have one of the following disease processes/diagnoses(primary or secondary)? Cardiothoracic surgery   Does the patient have Home health ordered? Yes   What is the Home health agency?  [facility]   Is there a DME ordered? Yes   What DME was ordered? nocturnal home O2 from [facility]   Prep survey completed? Yes            Pema HATCH - Registered Nurse

## 2024-06-17 ENCOUNTER — HOME HEALTH ADMISSION (OUTPATIENT)
Dept: HOME HEALTH SERVICES | Facility: HOME HEALTHCARE | Age: 74
End: 2024-06-17
Payer: MEDICARE

## 2024-06-17 ENCOUNTER — OFFICE VISIT (OUTPATIENT)
Dept: CARDIAC SURGERY | Facility: CLINIC | Age: 74
End: 2024-06-17
Payer: MEDICARE

## 2024-06-17 ENCOUNTER — TELEPHONE (OUTPATIENT)
Dept: CARDIAC SURGERY | Facility: CLINIC | Age: 74
End: 2024-06-17
Payer: MEDICARE

## 2024-06-17 ENCOUNTER — TELEPHONE (OUTPATIENT)
Dept: DIABETES SERVICES | Facility: HOSPITAL | Age: 74
End: 2024-06-17
Payer: MEDICARE

## 2024-06-17 VITALS
BODY MASS INDEX: 39.61 KG/M2 | DIASTOLIC BLOOD PRESSURE: 85 MMHG | HEART RATE: 70 BPM | OXYGEN SATURATION: 96 % | SYSTOLIC BLOOD PRESSURE: 141 MMHG | HEIGHT: 64 IN | WEIGHT: 232 LBS | RESPIRATION RATE: 20 BRPM

## 2024-06-17 DIAGNOSIS — L53.9 ERYTHEMA OF WOUND: ICD-10-CM

## 2024-06-17 DIAGNOSIS — Z95.1 S/P CABG X 3: Primary | ICD-10-CM

## 2024-06-17 PROCEDURE — 99024 POSTOP FOLLOW-UP VISIT: CPT | Performed by: NURSE PRACTITIONER

## 2024-06-17 PROCEDURE — 1160F RVW MEDS BY RX/DR IN RCRD: CPT | Performed by: NURSE PRACTITIONER

## 2024-06-17 PROCEDURE — 3077F SYST BP >= 140 MM HG: CPT | Performed by: NURSE PRACTITIONER

## 2024-06-17 PROCEDURE — 3079F DIAST BP 80-89 MM HG: CPT | Performed by: NURSE PRACTITIONER

## 2024-06-17 PROCEDURE — 1159F MED LIST DOCD IN RCRD: CPT | Performed by: NURSE PRACTITIONER

## 2024-06-17 RX ORDER — FUROSEMIDE 40 MG/1
40 TABLET ORAL 2 TIMES DAILY
Qty: 60 TABLET | Refills: 0 | Status: SHIPPED | OUTPATIENT
Start: 2024-06-17

## 2024-06-17 RX ORDER — POTASSIUM CHLORIDE 20 MEQ/1
20 TABLET, EXTENDED RELEASE ORAL 2 TIMES DAILY
Qty: 60 TABLET | Refills: 0 | Status: SHIPPED | OUTPATIENT
Start: 2024-06-17

## 2024-06-17 RX ORDER — DOXYCYCLINE 100 MG/1
100 TABLET ORAL 2 TIMES DAILY
Qty: 14 TABLET | Refills: 0 | Status: SHIPPED | OUTPATIENT
Start: 2024-06-17

## 2024-06-17 NOTE — PROGRESS NOTES
"CARDIOVASCULAR SURGERY FOLLOW-UP PROGRESS NOTE  Chief Complaint: Wound check        HPI:   Dear Seymour Muñiz MD and colleagues:    It was nice to see Jeannie Ruiz in follow up today after cardiac surgery.  As you know, she is a 74 y.o. female with MV CAD including left main, NSTEMI presentation, HTN, HLD, DM type 2, CKD stage 3, and obesity who underwent urgent CABG x 3 with LIMA to the dual LAD, SVG to PDA, SVG to a high marginal at [facility] by Dr. Hall on 6/6/2024. Her postop course was complicated by brief PAF and nocturnal hypoxia.  She did go home with oxygen at discharge for rest periods. She comes in today complaining of worsening erythema of SVHS.  She was sent home on Keflex.  Her daughter sent us a picture and then came to the office with another picture.  I requested that she bring her to our office.  Her medial right lower leg vein harvest site is more erythematous since discharge.  She has been taking her Keflex and washing her surgical incisions with antibacterial Dial soap.  Additionally, she reports her weight going up and more BLE edema.  She is on Lasix 40 mg daily with KCL.  Her activity level has been fair. She has not seen cardiology.  She has not started cardiac rehab.    Physical Exam:         /85 (BP Location: Right arm, Patient Position: Sitting, Cuff Size: Adult)   Pulse 70   Resp 20   Ht 162.6 cm (64\")   Wt 105 kg (232 lb)   SpO2 96%   BMI 39.82 kg/m²   Heart:  regular rate and rhythm  Lungs:  clear to auscultation bilaterally  Extremities:  1+ and pitting lower extremity edema bilaterally  Incision(s):  mid chest healing well, no significant erythema ( medial breast tissue had been erythematous and much improved from hospital), wearing surgical bra, mediastinal chest tube exit sites moist, right leg no drainage, no dehiscence, +erythema, sternum stable    Assessment/Plan:     s/p urgent CABG x 3 with LIMA to the dual LAD, SVG to PDA, SVG to a high marginal. She was " seen today for a wound check.  Her right lower leg vein harvest site is more erythematous than when she was discharged from the hospital.  She denies any fevers.  She denies any drainage.  She has continued to take Keflex that was prescribed at discharge.  I will place her on doxycycline for 7 days and she will keep her previously scheduled postop appointment.  Stop Keflex.  In regards to her edema and weight gain, I have increased her Lasix to 40 mg twice daily through Friday.  She will call the office on Friday morning to let me know how her edema and weight are  doing.  Shshe will try to take the potassium twice daily but struggles with the size of the potassium pills.  They will continue antibacterial soap.  She will also paint her chest tube exit sites with Betadine to help dry them out.      Post-op brief paroxysmal atrial fibrillation--not on anticoagulation  Post-op acute respiratory insufficiency with sleep periods--went home with oxygen    Keep incisions clean and dry  OK to begin cardiac rehab  Follow-up as scheduled with cardiology  Follow-up as scheduled with PCP  Follow-up with CT surgery--as scheduled    Continue lifting restriction of 10 lbs until 6 weeks and 50 lbs until 12 weeks from the date of surgery, no excessive jarring motions or twisting motions until 12 weeks from the date of surgery.    Thank you for allowing me to participate in the care of your patient.    Regards,  ARLIN Grajeda

## 2024-06-17 NOTE — TELEPHONE ENCOUNTER
Patient's daughter called to report that her vein graft site is more red, swollen, and is now warm to the touch. Patient was prescribed keflex at discharge and is taking it as prescribed. Patient is cleaning with antibacterial soap daily. Picture reviewed by ARLIN Correa, who recommends patient continue keflex and clean with antibacterial soap and betadine twice a day. Daughter verbalized understanding and agreed to plan of care. Daughter to  betadine from office today.

## 2024-06-19 ENCOUNTER — READMISSION MANAGEMENT (OUTPATIENT)
Dept: CALL CENTER | Facility: HOSPITAL | Age: 74
End: 2024-06-19
Payer: MEDICARE

## 2024-06-19 NOTE — OUTREACH NOTE
CT Surgery Week 1 Survey      Flowsheet Row Responses   [facility] patient discharged from? [facility]   Does the patient have one of the following disease processes/diagnoses(primary or secondary)? Cardiothoracic surgery   Week 1 attempt successful? No   Unsuccessful attempts Attempt 1              Gabriela PELAEZ - Registered Nurse

## 2024-06-20 ENCOUNTER — TELEPHONE (OUTPATIENT)
Dept: CARDIAC REHAB | Facility: HOSPITAL | Age: 74
End: 2024-06-20
Payer: MEDICARE

## 2024-06-21 ENCOUNTER — TELEPHONE (OUTPATIENT)
Dept: CARDIAC REHAB | Facility: HOSPITAL | Age: 74
End: 2024-06-21
Payer: MEDICARE

## 2024-06-21 ENCOUNTER — TELEPHONE (OUTPATIENT)
Dept: CARDIAC SURGERY | Facility: CLINIC | Age: 74
End: 2024-06-21
Payer: MEDICARE

## 2024-06-21 NOTE — TELEPHONE ENCOUNTER
Spoke with patients daughter aisha. Picture received of leg. Imaging reviewed with Madeline OLIVEROS. Per   Madeline continue current treatment and f/u appt next week.   Discussed Madeline OLIVEROS recommendation.  Advised if anything changes or if they have any questions/concerns to notify office. Aisha verbalized understanding and this was agreeable.

## 2024-06-21 NOTE — TELEPHONE ENCOUNTER
----- Message from Edith MARTIN sent at 6/21/2024 11:22 AM EDT -----  Ms. Ruiz's daughter called advised that Madeline asked her to call her today regarding Mrs. Ruiz. Her daughter's name is [phone] thank you.

## 2024-06-27 ENCOUNTER — OFFICE VISIT (OUTPATIENT)
Dept: CARDIAC SURGERY | Facility: CLINIC | Age: 74
End: 2024-06-27
Payer: MEDICARE

## 2024-06-27 VITALS
HEIGHT: 64 IN | WEIGHT: 228 LBS | SYSTOLIC BLOOD PRESSURE: 130 MMHG | TEMPERATURE: 97.5 F | RESPIRATION RATE: 18 BRPM | BODY MASS INDEX: 38.93 KG/M2 | OXYGEN SATURATION: 97 % | DIASTOLIC BLOOD PRESSURE: 71 MMHG | HEART RATE: 68 BPM

## 2024-06-27 DIAGNOSIS — Z95.1 S/P CABG X 3: Primary | ICD-10-CM

## 2024-06-27 DIAGNOSIS — L53.9 ERYTHEMA OF WOUND: ICD-10-CM

## 2024-06-27 PROCEDURE — 3075F SYST BP GE 130 - 139MM HG: CPT | Performed by: PHYSICIAN ASSISTANT

## 2024-06-27 PROCEDURE — 1159F MED LIST DOCD IN RCRD: CPT | Performed by: PHYSICIAN ASSISTANT

## 2024-06-27 PROCEDURE — 99024 POSTOP FOLLOW-UP VISIT: CPT | Performed by: PHYSICIAN ASSISTANT

## 2024-06-27 PROCEDURE — 3078F DIAST BP <80 MM HG: CPT | Performed by: PHYSICIAN ASSISTANT

## 2024-06-27 PROCEDURE — 1160F RVW MEDS BY RX/DR IN RCRD: CPT | Performed by: PHYSICIAN ASSISTANT

## 2024-06-27 NOTE — PROGRESS NOTES
"CARDIOVASCULAR SURGERY FOLLOW-UP PROGRESS NOTE  Chief Complaint: Wound check        HPI:   Dear Seymour Muñiz MD and colleagues:    It was nice to see Jeannie Ruiz in follow up today after cardiac surgery.  As you know, she is a 74 y.o. female with multivessel CAD including left main stenosis, NSTEMI presentation, HTN, HLD, DM type 2, CKD stage 3, and obesity who underwent urgent CABG x 3 with LIMA to the LAD, SVG to PDA, and DVG to a high marginal at [facility] by Dr. Hall on 6/6/24. Her post-operative course was complicated by brief paroxysmal atrial fibrillation and nocturnal hypoxia. She was discharged home with oxygen for rest periods. She was seen in the office on 6/17/24 for complaints of worsening erythema of her saphenous vein harvest site and was prescribed a 7 day course of Doxycycline. She comes in today, accompanied by her daughter, for another wound check. She states the erythema has resolved and she can tell the swelling is going down. She reports persistent hematoma and \"knots\" along her medial right leg from her saphenous vein harvest. She completed her Doxycycline 3 days ago, but is still taking her Lasix twice daily. She was recently seen by her PCP and told she could stop checking her blood glucose because they have all been within normal range. She is scheduled to follow-up with Cardiology on 7/17/24 and is hoping to start on Repatha since she can't tolerate statins. She has an appointment with her PCP again in early August. She reports still using nocturnal oxygen.      Physical Exam:         /71 (BP Location: Left arm, Patient Position: Sitting, Cuff Size: Adult)   Pulse 68   Temp 97.5 °F (36.4 °C)   Resp 18   Ht 162.6 cm (64\")   Wt 103 kg (228 lb)   SpO2 97%   BMI 39.14 kg/m²   Heart:  regular rate and rhythm, S1, S2 normal, no murmur, click, rub or gallop  Lungs:  clear to auscultation bilaterally  Extremities:  1+ lower extremity edema bilaterally  Incision(s):  mid " chest & right leg incisions healing well without erythema or drainage; sternum stable; hematoma and firmness along right medial thigh and upper calf    Assessment/Plan:     S/P CABG x 3. Overall, she is doing very well. Her lower extremity erythema has completely resolved. The area is still firm and looks like a hematoma along the saphenous vein harvest site, but is improving. I suggested she apply warm compresses. She still has lower extremity edema, so I instructed her to continue taking Lasix twice daily. She wants to come back to office one more time to have her leg examined. We will plan to see her back in 2 weeks. She will call us sooner if anything worsens, but if her leg continues to improve, she may cancel the appointment.     She is not taking any prescriptive pain medication, so I have released her to drive.     I instructed her to continue her nocturnal oxygen for now and follow-up with her PCP for an official sleep study.     Continue lifting restriction of 10 lbs until 6 weeks and 50 lbs until 12 weeks from the date of surgery. No excessive jarring motions or twisting motions until 12 weeks from the date of surgery.    Thank you for allowing me to participate in the care of your patient.    Regards,  CHENG Estrada

## 2024-07-03 ENCOUNTER — READMISSION MANAGEMENT (OUTPATIENT)
Dept: CALL CENTER | Facility: HOSPITAL | Age: 74
End: 2024-07-03
Payer: MEDICARE

## 2024-07-03 NOTE — OUTREACH NOTE
CT Surgery Week 3 Survey      Flowsheet Row Responses   [facility] patient discharged from? [facility]   Does the patient have one of the following disease processes/diagnoses(primary or secondary)? Cardiothoracic surgery   Week 3 attempt successful? Yes   Call start time 1629   Call end time 1634   Discharge diagnosis CAD, Urgent CABG x 3 using right leg vein harvest   Person spoke with today (if not patient) and relationship Momo,    Meds reviewed with patient/caregiver? Yes   Is the patient having any side effects they believe may be caused by any medication additions or changes? No   Does the patient have all medications related to this admission filled (includes all antibiotics, pain medications, cardiac medications, etc.) Yes   Is the patient taking all medications as directed (includes completed medication regime)? Yes   Does the patient have a primary care provider?  Yes   Does the patient have an appointment scheduled with their C/T surgeon? Yes   Has the patient kept scheduled appointments due by today? Yes   What is the Home health agency?  [facility]   Has home health visited the patient within 72 hours of discharge? Yes   What DME was ordered? nocturnal home O2 from [facility]   Has all DME been delivered? Yes   Psychosocial issues? No   Did the patient receive a copy of their discharge instructions? Yes   Nursing interventions Reviewed instructions with patient   What is the patient's perception of their health status since discharge? Improving   Nursing interventions Nurse provided patient education   Is the patient/caregiver able to teach back normal signs of recovery? Pain or discomfort at incisional site   Nursing interventions Reassured on normal signs of recovery   Is the patient /caregiver able to teach back basic post-op care? Use a clean wash cloth and antibacterial bar or liquid soap to clean incisions, If the steri-strips are falling off, it is okay to remove them. (If applicable), Lifting  as instructed by MD in discharge instructions   Is the patient/caregiver able to teach back signs and symptoms of incisional infection? Increased redness, swelling or pain at the incisonal site, Increased drainage or bleeding, Pus or odor from incision, Incisional warmth, Fever   Is the patient/caregiver able to teach back steps to recovery at home? Set small, achievable goals for return to baseline health, Rest and rebuild strength, gradually increase activity, Eat a well-balance diet   Is the patient /caregiver able to teach back the importance of cardiac rehab? Yes   If the patient is a current smoker, are they able to teach back resources for cessation? Not a smoker   Is the patient/caregiver able to teach back the hierarchy of who to call/visit for symptoms/problems? PCP, Specialist, Home health nurse, Urgent Care, ED, 911 Yes   Week 3 call completed? Yes   Graduated Yes   Is the patient interested in additional calls from an ambulatory ? No   Would this patient benefit from a Referral to [facility] Social Work? No   Call end time 3545            Jackelyn Nguyen Registered Nurse

## 2024-07-08 ENCOUNTER — TELEPHONE (OUTPATIENT)
Dept: CARDIOLOGY | Facility: CLINIC | Age: 74
End: 2024-07-08
Payer: MEDICARE

## 2024-07-08 NOTE — TELEPHONE ENCOUNTER
Caller: aisha johnson    Relationship: Emergency Contact    Best call back number: [phone]      PATIENTS DAUGHTER IS NOT OKAY WITH NEXT AVAILABLE APPT OF 8.5.24  SHE STATES THE PATIENT NEEDS TO BE SEEN SOONER.  PLEASE REACH OUT TO DISCUSS.

## 2024-07-08 NOTE — TELEPHONE ENCOUNTER
Caller: aisha johnson    Relationship: Emergency Contact    Best call back number: [phone]    What was the call regarding: PT'S DAUGHTER IS CALLING TO LET US KNOW THE PT IS SCHEDULED FOR 7.9.24

## 2024-07-09 ENCOUNTER — OFFICE VISIT (OUTPATIENT)
Dept: CARDIOLOGY | Facility: CLINIC | Age: 74
End: 2024-07-09
Payer: MEDICARE

## 2024-07-09 VITALS
HEART RATE: 73 BPM | SYSTOLIC BLOOD PRESSURE: 114 MMHG | HEIGHT: 64 IN | OXYGEN SATURATION: 97 % | WEIGHT: 229 LBS | DIASTOLIC BLOOD PRESSURE: 70 MMHG | BODY MASS INDEX: 39.09 KG/M2

## 2024-07-09 DIAGNOSIS — E78.5 DYSLIPIDEMIA: ICD-10-CM

## 2024-07-09 DIAGNOSIS — Z95.1 HX OF CABG: Primary | ICD-10-CM

## 2024-07-09 DIAGNOSIS — I10 ESSENTIAL HYPERTENSION: ICD-10-CM

## 2024-07-09 PROCEDURE — 3074F SYST BP LT 130 MM HG: CPT | Performed by: INTERNAL MEDICINE

## 2024-07-09 PROCEDURE — 93000 ELECTROCARDIOGRAM COMPLETE: CPT | Performed by: INTERNAL MEDICINE

## 2024-07-09 PROCEDURE — 1159F MED LIST DOCD IN RCRD: CPT | Performed by: INTERNAL MEDICINE

## 2024-07-09 PROCEDURE — 1160F RVW MEDS BY RX/DR IN RCRD: CPT | Performed by: INTERNAL MEDICINE

## 2024-07-09 PROCEDURE — 3078F DIAST BP <80 MM HG: CPT | Performed by: INTERNAL MEDICINE

## 2024-07-09 PROCEDURE — 99204 OFFICE O/P NEW MOD 45 MIN: CPT | Performed by: INTERNAL MEDICINE

## 2024-07-09 NOTE — PROGRESS NOTES
Encounter Date:2024      Patient ID: Jeannie Ruiz is a 74 y.o. female.    Chief Complaint:  Status post CABG  Dyslipidemia  Postoperative atrial fibrillation    History of Present Illness  The patient is a pleasant 74-year-old white female is here for evaluation and follow-up of cardiovascular status.  Patient is from [facility] and her daughter lives in [facility].  Patient recently had chest discomfort and subsequently was found to have non-STEMI and had cardiac catheterization in the [facility] [place] and was transferred to [facility] and had CABG performed by Dr. Hall on 2024.  Patient's postoperative course was essentially uneventful except for postoperative transient atrial fibrillation.  Patient is staying with her daughter here in [place].  Patient is asymptomatic at this time.    Patient is not having any the patient has been without any chest discomfort ,shortness of breath, palpitations, dizziness or syncope.  Denies having any headache ,abdominal pain ,nausea, vomiting , diarrhea constipation, loss of weight or loss of appetite.  Denies having any excessive bruising ,hematuria or blood in the stool.    Review of all systems negative except as indicated.    Reviewed ROS.    Assessment and Plan     ]]]]]]]]]]]]]]]]]]]]]]]  Impression  ===========  -Status post CABG with LIMA to dual LAD, SVG to PDA and SVG to high marginal-Dr. Hall-2024.    - Preoperative non-STEMI    - Postoperative transient atrial fibrillation    - Dyslipidemia hypertension borderline diabetes.  CKD 3    - Exogenous obesity (BMI 39)    - Status post cholecystectomy.  Status post  section x 3.    - Non-smoker    - Intolerance to statins although patient is able to take lovastatin.  ============  Plan  ============  Status post CABG 2024.  Patient is not having any angina pectoris or congestive heart failure.  Incisions have healed well.    Preoperative non-STEMI.  Patient is on DAPT with  aspirin and Plavix.    Hypertension-well-controlled  114/70.  Patient is on Metroprolol tartrate 25 mg twice daily.    Postoperative atrial fibrillation.  Patient is maintaining sinus rhythm.  Patient is on amiodarone 200 mg a day.    Dyslipidemia-on lovastatin.  Patient was intolerant to other statins.  Consider Repatha by primary care.  Was thought to be cost will be due.    Patient to participate in cardiac rehabilitation.  Requested by weight.    Medications were reviewed and updated.  Current medications include baby aspirin Plavix 75 mg a day lovastatin 20 mg p.o. nightly Metroprolol tartrate 25 mg twice daily Lasix 40 mg twice daily potassium 20 mEq twice daily amiodarone 200 mg a day.    Wean off amiodarone with taking 200 mg every other day and discontinue next 2 to 3 weeks.  Discontinue Plavix after completion of 3 months course (preoperative non-STEMI).    Have discussed with patient and patient's daughter regarding activities limitations etc.    Follow-up in the office in about 8 weeks with EKG.    Further plan will depend on patient's progress.  ]]]]]]]]]]]]]]]]]]]]]]]]]       Diagnosis Plan   1. Hx of CABG  Cardiac Rehab Phase II    ECG 12 Lead      2. Dyslipidemia  Cardiac Rehab Phase II    ECG 12 Lead      LAB RESULTS (LAST 7 DAYS)    CBC        BMP        CMP         BNP        TROPONIN        CoAg        Creatinine Clearance  Estimated Creatinine Clearance: 67.2 mL/min (by C-G formula based on SCr of 0.85 mg/dL).    ABG        Radiology  No radiology results for the last day                The following portions of the patient's history were reviewed and updated as appropriate: allergies, current medications, past family history, past medical history, past social history, past surgical history, and problem list.    Review of Systems   Constitutional: Positive for malaise/fatigue.   Cardiovascular:  Positive for leg swelling. Negative for chest pain, dyspnea on exertion and palpitations.    Respiratory:  Positive for shortness of breath. Negative for cough.    Gastrointestinal:  Negative for abdominal pain, nausea and vomiting.   Neurological:  Negative for dizziness, focal weakness, headaches, light-headedness and numbness.   All other systems reviewed and are negative.        Current Outpatient Medications:     Accu-Chek Softclix Lancets lancets, 1 each by Other route Daily. Use as instructed Dx code: E11.9, Disp: 100 each, Rfl: 2    amiodarone (PACERONE) 200 MG tablet, Take 1 tablet by mouth 2 (Two) Times a Day With Meals for 7 days, THEN 1 tablet Daily for 21 days., Disp: 35 tablet, Rfl: 1    aspirin 81 MG EC tablet, Take 1 tablet by mouth Daily., Disp: , Rfl:     Blood Glucose Monitoring Suppl (Accu-Chek Guide Me) w/Device kit, Use 1 kit Daily. Dx code: E11.9  NDC [address]-27  Bin#: [id number] Group #: [id number]  ID #: [id number]  Issuer #: [id number]), Disp: 1 kit, Rfl: 0    clopidogrel (PLAVIX) 75 MG tablet, Take 1 tablet by mouth Daily., Disp: 30 tablet, Rfl: 3    coenzyme Q10 100 MG capsule, Take 1 capsule by mouth Daily., Disp: , Rfl:     Flaxseed, Linseed, (Flax Seed Oil) 1000 MG capsule, Take 1 capsule by mouth Daily., Disp: , Rfl:     furosemide (Lasix) 40 MG tablet, Take 1 tablet by mouth 2 (Two) Times a Day., Disp: 60 tablet, Rfl: 0    glucose blood (Accu-Chek Guide) test strip, 1 each by Other route Daily. Use as instructed Dx code: E11.9, Disp: 100 each, Rfl: 2    lovastatin (MEVACOR) 20 MG tablet, Take 1 tablet by mouth Daily., Disp: , Rfl:     metoprolol tartrate (LOPRESSOR) 25 MG tablet, Take 1 tablet by mouth Every 12 (Twelve) Hours., Disp: 60 tablet, Rfl: 3    nystatin (MYCOSTATIN) 685634 UNIT/GM powder, Apply  topically to the appropriate area as directed Every 12 (Twelve) Hours., Disp: 15 g, Rfl: 0    omega-3 acid ethyl esters (LOVAZA) 1 g capsule, Take 1 capsule by mouth., Disp: , Rfl:     potassium chloride (KLOR-CON M20) 20 MEQ CR tablet, Take 1 tablet by mouth 2 (Two) Times a  "Day., Disp: 60 tablet, Rfl: 0    VITAMIN E 400 UNIT capsule, Take 1 capsule by mouth Daily., Disp: , Rfl:     Allergies   Allergen Reactions    Statins Other (See Comments)     Muscle aches         History reviewed. No pertinent family history.    Past Surgical History:   Procedure Laterality Date    APPENDECTOMY      BREAST SURGERY      COLONOSCOPY      CORONARY ARTERY BYPASS GRAFT N/A 2024    Procedure: CORONARY ARTERY BYPASS WITH INTERNAL MAMMARY ARTERY GRAFT with intraop CHARLEE;  Surgeon: Bienvenido Hall MD;  Location: [facility];  Service: Cardiothoracic;  Laterality: N/A;  CABG x3,  2 vein grafts and 1 LIMA    SKIN BIOPSY         Past Medical History:   Diagnosis Date    Coronary artery disease     Diabetes mellitus     Elevated cholesterol     Hematuria     chronic microscopic    Hypertension        History reviewed. No pertinent family history.    Social History     Socioeconomic History    Marital status:    Tobacco Use    Smoking status: Former     Current packs/day: 0.00     Types: Cigarettes     Quit date:      Years since quittin.5     Passive exposure: Never    Smokeless tobacco: Never   Vaping Use    Vaping status: Never Used   Substance and Sexual Activity    Alcohol use: Not Currently    Drug use: Never    Sexual activity: Defer           ECG 12 Lead    Date/Time: 2024 4:51 PM  Performed by: Jayjay Wright MD    Authorized by: Jayjya Wright MD  Comparison: compared with previous ECG   Similar to previous ECG  Comparison to previous ECG: Normal sinus rhythm nonspecific ST-T wave changes 75/min normal intervals no ectopy no significant change from previous EKG.          Objective:       Physical Exam    /70 (BP Location: Right arm, Patient Position: Sitting, Cuff Size: Adult)   Pulse 73   Ht 162.6 cm (64\")   Wt 104 kg (229 lb)   SpO2 97%   BMI 39.31 kg/m²   The patient is alert, oriented and in no distress.    Vital signs as noted above.  Exogenous obesity " (BMI 39)    Head and neck revealed no carotid bruits or jugular venous distension.  No thyromegaly or lymphadenopathy is present.    Lungs clear.  No wheezing.  Breath sounds are normal bilaterally.    Heart normal first and second heart sounds.  No murmur..  No pericardial rub is present.  No gallop is present.    Abdomen soft and nontender.  No organomegaly is present.    Extremities revealed good peripheral pulses without any pedal edema.    Skin warm and dry.  Incisions have healed well.    Musculoskeletal system is grossly normal.    CNS grossly normal.    Reviewed and updated.

## 2024-07-17 ENCOUNTER — TELEPHONE (OUTPATIENT)
Dept: CARDIAC SURGERY | Facility: CLINIC | Age: 74
End: 2024-07-17
Payer: MEDICARE

## 2024-07-17 NOTE — TELEPHONE ENCOUNTER
Called primary number for patient and no answer, no option to leave vm. Called patients daughter and left vm about switching appt for 7/18/24 to 1:45pm and ask that someone please call office back to confirm change or reschedule.

## 2024-07-18 ENCOUNTER — OFFICE VISIT (OUTPATIENT)
Dept: CARDIAC SURGERY | Facility: CLINIC | Age: 74
End: 2024-07-18
Payer: MEDICARE

## 2024-07-18 VITALS
SYSTOLIC BLOOD PRESSURE: 153 MMHG | HEIGHT: 64 IN | BODY MASS INDEX: 38.93 KG/M2 | DIASTOLIC BLOOD PRESSURE: 89 MMHG | TEMPERATURE: 97.7 F | HEART RATE: 67 BPM | WEIGHT: 228 LBS | RESPIRATION RATE: 20 BRPM | OXYGEN SATURATION: 95 %

## 2024-07-18 DIAGNOSIS — J95.89 ACUTE RESPIRATORY INSUFFICIENCY, POSTOPERATIVE: Primary | ICD-10-CM

## 2024-07-18 DIAGNOSIS — Z95.1 S/P CABG X 3: ICD-10-CM

## 2024-07-18 PROCEDURE — 3077F SYST BP >= 140 MM HG: CPT | Performed by: NURSE PRACTITIONER

## 2024-07-18 PROCEDURE — 1159F MED LIST DOCD IN RCRD: CPT | Performed by: NURSE PRACTITIONER

## 2024-07-18 PROCEDURE — 1160F RVW MEDS BY RX/DR IN RCRD: CPT | Performed by: NURSE PRACTITIONER

## 2024-07-18 PROCEDURE — 3079F DIAST BP 80-89 MM HG: CPT | Performed by: NURSE PRACTITIONER

## 2024-07-18 PROCEDURE — 99024 POSTOP FOLLOW-UP VISIT: CPT | Performed by: NURSE PRACTITIONER

## 2024-07-18 RX ORDER — FUROSEMIDE 40 MG/1
40 TABLET ORAL 2 TIMES DAILY
Qty: 60 TABLET | Refills: 1 | Status: SHIPPED | OUTPATIENT
Start: 2024-07-18

## 2024-07-18 RX ORDER — POTASSIUM CHLORIDE 20 MEQ/1
20 TABLET, EXTENDED RELEASE ORAL 2 TIMES DAILY
Qty: 60 TABLET | Refills: 1 | Status: SHIPPED | OUTPATIENT
Start: 2024-07-18

## 2024-07-18 NOTE — LETTER
July 18, 2024       No Recipients    Patient: Jeannie Ruiz   YOB: 1950   Date of Visit: 7/18/2024     Dear Dc Thompson MD:       Thank you for referring Jeannie Ruiz to me for evaluation. Below are the relevant portions of my assessment and plan of care.    If you have questions, please do not hesitate to call me. I look forward to following Jeannie along with you.         Sincerely,        ARLIN Grajeda        CC:   No Recipients    Madeline Jung APRN  07/18/24 1646  Sign when Signing Visit  CARDIOVASCULAR SURGERY FOLLOW-UP PROGRESS NOTE  Chief Complaint: Post-op Follow Up        HPI:   Dear Seymour Muñiz MD and colleagues:    It was nice to see Jeannie Ruiz in follow up today after cardiac surgery.  As you know, she is a 74 y.o. female with MV CAD including left main disease, non-STEMI presentation, HTN, HLD with statin intolerance, DM type 2, CKD stage III, and obesity stage III who underwent urgent CABG x 3 with LIMA to the dual LAD, SVG to PDA, SVG to a high marginal at [facility] by Dr. Hall on 6/6/2024. She did well postoperatively but did have postop atrial fib and postop respiratory insuffiencey that required oxygen use with sleep periods. She comes in today complaining of nothing.  She and her daughter have come with their questions.  She went to see Dr. Wright since she is staying with her daughter in [place].  They were told that they had to see a cardiologist to start cardiac rehab here in [place].  She also kept her appt with [facility] in [place].  She plans to return home after completing cardiac rehab.  Her activity level has been fair.  She reports no stamina and fatigue.   She reports her amiodarone was stopped d/t a tremor.  Her BP today is elevated at 153/89.  She has been compliant with her medications.  Her daughter reports her BP has been in the 120-130s at home.  Additionally, her daughter is concerned that she  "sometimes asks the same question repeatedly and does not remember Dr. Thompson or some events from the hospital stay.  She has been doing puzzles and [employer] for brain engagement.    Physical Exam:         /89 (BP Location: Left arm, Patient Position: Sitting, Cuff Size: Adult)   Pulse 67   Temp 97.7 °F (36.5 °C)   Resp 20   Ht 162.6 cm (64\")   Wt 103 kg (228 lb)   SpO2 95%   BMI 39.14 kg/m²   Heart:  regular rate and rhythm  Lungs:  clear to auscultation bilaterally  Extremities:  no edema  Incision(s):  mid chest healing well, right leg healing well, she still has a palpable vein harvest tract but cellulitis/ erythema have resolved, sternum stable    Assessment/Plan:     S/P urgent CABG x 3 with LIMA to the dual LAD, SVG to PDA, SVG to a high marginal . Overall, she is doing well.  I encouraged her to start liberalizing her activities.  Cardiac rehab will be physically good for her and will instill confidence.  She is asking if she will have to continue taking Lasix bid. But hesitant to stop it.  She had been on losartan 100 mg daily prior to CABG.  Given her CAD/ DM/ HTN/ CKD that would be a good medicine to resume.  She and her daughter will keep a BP log and be in touch about her BP remains elevated.  I anticipate that we will decrease her Lasix to 40 day and restart some losartan in near future.  An order was also placed for pt to have nocturnal oximetry by Luis Manuel to see if her oxygen can be discontinued.  We will see her PRN.    Post-op atrial fibrillation    OK to drive if not taking narcotic pain medicine  OK to begin cardiac rehab  Follow-up as scheduled with cardiology  Follow-up as scheduled with PCP  Follow-up with CT surgery prn    Continue lifting restriction of 10 lbs until 6 weeks and 50 lbs until 12 weeks from the date of surgery, no excessive jarring motions or twisting motions until 12 weeks from the date of surgery.    Thank you for allowing me to participate in the care of your " patient.    Regards,  Madeline Jung, APRN

## 2024-07-18 NOTE — PROGRESS NOTES
"CARDIOVASCULAR SURGERY FOLLOW-UP PROGRESS NOTE  Chief Complaint: Post-op Follow Up        HPI:   Dear Dr. Aldridge, Seymour HATCH MD and colleagues:    It was nice to see Jeannie Ruiz in follow up today after cardiac surgery.  As you know, she is a 74 y.o. female with MV CAD including left main disease, non-STEMI presentation, HTN, HLD with statin intolerance, DM type 2, CKD stage III, and obesity stage III who underwent urgent CABG x 3 with LIMA to the dual LAD, SVG to PDA, SVG to a high marginal at [facility] by Dr. Hall on 6/6/2024. She did well postoperatively but did have postop atrial fib and postop respiratory insuffiencey that required oxygen use with sleep periods. She comes in today complaining of nothing.  She and her daughter have come with their questions.  She went to see Dr. Wright since she is staying with her daughter in [place].  They were told that they had to see a cardiologist to start cardiac rehab here in [place].  She also kept her appt with [facility] in [place].  She plans to return home after completing cardiac rehab.  Her activity level has been fair.  She reports no stamina and fatigue.   She reports her amiodarone was stopped d/t a tremor.  Her BP today is elevated at 153/89.  She has been compliant with her medications.  Her daughter reports her BP has been in the 120-130s at home.  Additionally, her daughter is concerned that she sometimes asks the same question repeatedly and does not remember Dr. Thompson or some events from the hospital stay.  She has been doing puzzles and sudoko for brain engagement.    Physical Exam:         /89 (BP Location: Left arm, Patient Position: Sitting, Cuff Size: Adult)   Pulse 67   Temp 97.7 °F (36.5 °C)   Resp 20   Ht 162.6 cm (64\")   Wt 103 kg (228 lb)   SpO2 95%   BMI 39.14 kg/m²   Heart:  regular rate and rhythm  Lungs:  clear to auscultation bilaterally  Extremities:  no edema  Incision(s):  mid chest healing well, right leg " healing well, she still has a palpable vein harvest tract but cellulitis/ erythema have resolved, sternum stable    Assessment/Plan:     S/P urgent CABG x 3 with LIMA to the dual LAD, SVG to PDA, SVG to a high marginal . Overall, she is doing well.  I encouraged her to start liberalizing her activities.  Cardiac rehab will be physically good for her and will instill confidence.  She is asking if she will have to continue taking Lasix bid. But hesitant to stop it.  She had been on losartan 100 mg daily prior to CABG.  Given her CAD/ DM/ HTN/ CKD that would be a good medicine to resume.  She and her daughter will keep a BP log and be in touch about her BP remains elevated.  I anticipate that we will decrease her Lasix to 40 day and restart some losartan in near future.  An order was also placed for pt to have nocturnal oximetry by Luis Manuel to see if her oxygen can be discontinued.  We will see her PRN.    Post-op atrial fibrillation    OK to drive if not taking narcotic pain medicine  OK to begin cardiac rehab  Follow-up as scheduled with cardiology  Follow-up as scheduled with PCP  Follow-up with CT surgery prn    Continue lifting restriction of 10 lbs until 6 weeks and 50 lbs until 12 weeks from the date of surgery, no excessive jarring motions or twisting motions until 12 weeks from the date of surgery.    Thank you for allowing me to participate in the care of your patient.    Regards,  ARLIN Grajeda

## 2024-07-23 ENCOUNTER — OFFICE VISIT (OUTPATIENT)
Dept: CARDIAC REHAB | Facility: HOSPITAL | Age: 74
End: 2024-07-23
Payer: MEDICARE

## 2024-07-23 ENCOUNTER — TRANSCRIBE ORDERS (OUTPATIENT)
Dept: ADMINISTRATIVE | Facility: HOSPITAL | Age: 74
End: 2024-07-23
Payer: MEDICARE

## 2024-07-23 ENCOUNTER — LAB (OUTPATIENT)
Dept: LAB | Facility: HOSPITAL | Age: 74
End: 2024-07-23
Payer: MEDICARE

## 2024-07-23 DIAGNOSIS — E78.2 MULTIPLE-TYPE HYPERLIPIDEMIA: ICD-10-CM

## 2024-07-23 DIAGNOSIS — D50.9 IRON DEFICIENCY ANEMIA, UNSPECIFIED IRON DEFICIENCY ANEMIA TYPE: Primary | ICD-10-CM

## 2024-07-23 DIAGNOSIS — E10.69 MIXED DIABETIC HYPERLIPIDEMIA ASSOCIATED WITH TYPE 1 DIABETES MELLITUS: ICD-10-CM

## 2024-07-23 DIAGNOSIS — E78.2 MIXED DIABETIC HYPERLIPIDEMIA ASSOCIATED WITH TYPE 1 DIABETES MELLITUS: ICD-10-CM

## 2024-07-23 DIAGNOSIS — Z95.1 S/P CABG (CORONARY ARTERY BYPASS GRAFT): Primary | ICD-10-CM

## 2024-07-23 DIAGNOSIS — Z95.1 HX OF CABG: ICD-10-CM

## 2024-07-23 DIAGNOSIS — E78.5 DYSLIPIDEMIA: ICD-10-CM

## 2024-07-23 DIAGNOSIS — D50.9 IRON DEFICIENCY ANEMIA, UNSPECIFIED IRON DEFICIENCY ANEMIA TYPE: ICD-10-CM

## 2024-07-23 LAB
ALBUMIN SERPL-MCNC: 4.3 G/DL (ref 3.5–5.2)
ALBUMIN UR-MCNC: <1.2 MG/DL
ALBUMIN/GLOB SERPL: 1.3 G/DL
ALP SERPL-CCNC: 89 U/L (ref 39–117)
ALT SERPL W P-5'-P-CCNC: 18 U/L (ref 1–33)
ANION GAP SERPL CALCULATED.3IONS-SCNC: 15 MMOL/L (ref 5–15)
AST SERPL-CCNC: 25 U/L (ref 1–32)
BACTERIA UR QL AUTO: ABNORMAL /HPF
BASOPHILS # BLD AUTO: 0.03 10*3/MM3 (ref 0–0.2)
BASOPHILS NFR BLD AUTO: 0.6 % (ref 0–1.5)
BILIRUB SERPL-MCNC: 0.5 MG/DL (ref 0–1.2)
BILIRUB UR QL STRIP: NEGATIVE
BUN SERPL-MCNC: 22 MG/DL (ref 8–23)
BUN/CREAT SERPL: 19.5 (ref 7–25)
CALCIUM SPEC-SCNC: 9.4 MG/DL (ref 8.6–10.5)
CHLORIDE SERPL-SCNC: 96 MMOL/L (ref 98–107)
CHOLEST SERPL-MCNC: 207 MG/DL (ref 0–200)
CLARITY UR: CLEAR
CO2 SERPL-SCNC: 25 MMOL/L (ref 22–29)
COLOR UR: YELLOW
CREAT SERPL-MCNC: 1.13 MG/DL (ref 0.57–1)
DEPRECATED RDW RBC AUTO: 48.1 FL (ref 37–54)
EGFRCR SERPLBLD CKD-EPI 2021: 50.8 ML/MIN/1.73
EOSINOPHIL # BLD AUTO: 0.13 10*3/MM3 (ref 0–0.4)
EOSINOPHIL NFR BLD AUTO: 2.4 % (ref 0.3–6.2)
ERYTHROCYTE [DISTWIDTH] IN BLOOD BY AUTOMATED COUNT: 13.6 % (ref 12.3–15.4)
GLOBULIN UR ELPH-MCNC: 3.3 GM/DL
GLUCOSE SERPL-MCNC: 92 MG/DL (ref 65–99)
GLUCOSE UR STRIP-MCNC: NEGATIVE MG/DL
HBA1C MFR BLD: 5.9 % (ref 4.8–5.6)
HCT VFR BLD AUTO: 40 % (ref 34–46.6)
HDLC SERPL-MCNC: 49 MG/DL (ref 40–60)
HGB BLD-MCNC: 12.6 G/DL (ref 12–15.9)
HGB UR QL STRIP.AUTO: NEGATIVE
HOLD SPECIMEN: NORMAL
HYALINE CASTS UR QL AUTO: ABNORMAL /LPF
IMM GRANULOCYTES # BLD AUTO: 0.01 10*3/MM3 (ref 0–0.05)
IMM GRANULOCYTES NFR BLD AUTO: 0.2 % (ref 0–0.5)
KETONES UR QL STRIP: NEGATIVE
LDLC SERPL CALC-MCNC: 126 MG/DL (ref 0–100)
LDLC/HDLC SERPL: 2.49 {RATIO}
LEUKOCYTE ESTERASE UR QL STRIP.AUTO: ABNORMAL
LYMPHOCYTES # BLD AUTO: 1.44 10*3/MM3 (ref 0.7–3.1)
LYMPHOCYTES NFR BLD AUTO: 26.9 % (ref 19.6–45.3)
MCH RBC QN AUTO: 30.1 PG (ref 26.6–33)
MCHC RBC AUTO-ENTMCNC: 31.5 G/DL (ref 31.5–35.7)
MCV RBC AUTO: 95.5 FL (ref 79–97)
MONOCYTES # BLD AUTO: 0.58 10*3/MM3 (ref 0.1–0.9)
MONOCYTES NFR BLD AUTO: 10.8 % (ref 5–12)
NEUTROPHILS NFR BLD AUTO: 3.17 10*3/MM3 (ref 1.7–7)
NEUTROPHILS NFR BLD AUTO: 59.1 % (ref 42.7–76)
NITRITE UR QL STRIP: NEGATIVE
NRBC BLD AUTO-RTO: 0 /100 WBC (ref 0–0.2)
PH UR STRIP.AUTO: 6 [PH] (ref 5–8)
PLATELET # BLD AUTO: 253 10*3/MM3 (ref 140–450)
PMV BLD AUTO: 9.5 FL (ref 6–12)
POTASSIUM SERPL-SCNC: 3.9 MMOL/L (ref 3.5–5.2)
PROT SERPL-MCNC: 7.6 G/DL (ref 6–8.5)
PROT UR QL STRIP: NEGATIVE
RBC # BLD AUTO: 4.19 10*6/MM3 (ref 3.77–5.28)
RBC # UR STRIP: ABNORMAL /HPF
REF LAB TEST METHOD: ABNORMAL
SODIUM SERPL-SCNC: 136 MMOL/L (ref 136–145)
SP GR UR STRIP: 1.02 (ref 1–1.03)
SQUAMOUS #/AREA URNS HPF: ABNORMAL /HPF
TRIGL SERPL-MCNC: 179 MG/DL (ref 0–150)
UROBILINOGEN UR QL STRIP: ABNORMAL
VLDLC SERPL-MCNC: 32 MG/DL (ref 5–40)
WBC # UR STRIP: ABNORMAL /HPF
WBC NRBC COR # BLD AUTO: 5.36 10*3/MM3 (ref 3.4–10.8)

## 2024-07-23 PROCEDURE — 80061 LIPID PANEL: CPT

## 2024-07-23 PROCEDURE — 80053 COMPREHEN METABOLIC PANEL: CPT

## 2024-07-23 PROCEDURE — 83036 HEMOGLOBIN GLYCOSYLATED A1C: CPT

## 2024-07-23 PROCEDURE — 82043 UR ALBUMIN QUANTITATIVE: CPT

## 2024-07-23 PROCEDURE — 81001 URINALYSIS AUTO W/SCOPE: CPT

## 2024-07-23 PROCEDURE — 93798 PHYS/QHP OP CAR RHAB W/ECG: CPT

## 2024-07-23 PROCEDURE — 36415 COLL VENOUS BLD VENIPUNCTURE: CPT

## 2024-07-23 PROCEDURE — 85025 COMPLETE CBC W/AUTO DIFF WBC: CPT

## 2024-07-24 ENCOUNTER — TREATMENT (OUTPATIENT)
Dept: CARDIAC REHAB | Facility: HOSPITAL | Age: 74
End: 2024-07-24
Payer: MEDICARE

## 2024-07-24 DIAGNOSIS — Z95.1 HX OF CABG: Primary | ICD-10-CM

## 2024-07-24 PROCEDURE — 93798 PHYS/QHP OP CAR RHAB W/ECG: CPT

## 2024-07-26 ENCOUNTER — TREATMENT (OUTPATIENT)
Dept: CARDIAC REHAB | Facility: HOSPITAL | Age: 74
End: 2024-07-26
Payer: MEDICARE

## 2024-07-26 DIAGNOSIS — Z95.1 S/P CABG (CORONARY ARTERY BYPASS GRAFT): Primary | ICD-10-CM

## 2024-07-26 PROCEDURE — 93798 PHYS/QHP OP CAR RHAB W/ECG: CPT

## 2024-07-30 ENCOUNTER — TREATMENT (OUTPATIENT)
Dept: CARDIAC REHAB | Facility: HOSPITAL | Age: 74
End: 2024-07-30
Payer: MEDICARE

## 2024-07-30 ENCOUNTER — TELEPHONE (OUTPATIENT)
Dept: CARDIAC SURGERY | Facility: CLINIC | Age: 74
End: 2024-07-30
Payer: MEDICARE

## 2024-07-30 DIAGNOSIS — Z95.1 S/P CABG (CORONARY ARTERY BYPASS GRAFT): Primary | ICD-10-CM

## 2024-07-30 PROCEDURE — 93798 PHYS/QHP OP CAR RHAB W/ECG: CPT

## 2024-07-30 NOTE — TELEPHONE ENCOUNTER
Overnight oximetry results received. Patient qualifies for nocturnal oxygen use. Discussed results with patient's daughter Rowena. Advised to continue 2liters O2 nightly and for pcp to manage going forward.     Rowena is going to email a picture of harvest site on leg, blister, increased redness.   [email or web address]. Once picture received will have Madeline OLIVEROS review for further recommendation.

## 2024-07-31 ENCOUNTER — TREATMENT (OUTPATIENT)
Dept: CARDIAC REHAB | Facility: HOSPITAL | Age: 74
End: 2024-07-31
Payer: MEDICARE

## 2024-07-31 DIAGNOSIS — Z95.1 S/P CABG (CORONARY ARTERY BYPASS GRAFT): Primary | ICD-10-CM

## 2024-07-31 PROCEDURE — 93798 PHYS/QHP OP CAR RHAB W/ECG: CPT

## 2024-08-01 ENCOUNTER — OFFICE VISIT (OUTPATIENT)
Dept: CARDIAC SURGERY | Facility: CLINIC | Age: 74
End: 2024-08-01
Payer: MEDICARE

## 2024-08-01 DIAGNOSIS — Z95.1 S/P CABG X 3: Primary | ICD-10-CM

## 2024-08-01 DIAGNOSIS — L53.9 ERYTHEMA OF WOUND: ICD-10-CM

## 2024-08-01 PROCEDURE — 1160F RVW MEDS BY RX/DR IN RCRD: CPT | Performed by: NURSE PRACTITIONER

## 2024-08-01 PROCEDURE — 1159F MED LIST DOCD IN RCRD: CPT | Performed by: NURSE PRACTITIONER

## 2024-08-01 PROCEDURE — 99024 POSTOP FOLLOW-UP VISIT: CPT | Performed by: NURSE PRACTITIONER

## 2024-08-01 NOTE — PROGRESS NOTES
"CARDIOVASCULAR SURGERY FOLLOW-UP PROGRESS NOTE  Chief Complaint: Wound check        HPI:   Dear Dr. Aldridge, Seymour HATCH MD and colleagues:    It was nice to see Jeannie Ruiz in follow up today after cardiac surgery.  As you know, she is a 74 y.o. female who underwent  urgent CABG x 3 with LIMA to the dual LAD, SVG to PDA, SVG to a high marginal at [facility] by Dr. Hall on 6/6/2024.  Her postop course has been slower than she would like.  She is still staying with her daughter here in [place].  She has started cardiac rehab here at [facility].  She phoned this week for her right leg to be evaluated.  She reports a \"blister\" appeared on her SVHS incisions.  Nurses in cardiac rehab notified us that her leg needed to be evaluated.  Her daughter is here with her.    Physical Exam:         There were no vitals taken for this visit.  Extremities:  Right SVHS just distal to her knee had an erythematous base from a ruptured fluid filled blister;  light pressure was applied to the surrounding area and a piece of undissolved suture was extracted.  Wound was painted with betadine and bandaid applied.      Assessment/Plan:     S/P urgent CABG x 3 with LIMA to the dual LAD, SVG to PDA, SVG to a high marginal.  Overall, she is doing well.  She had a piece of undissolved remaining suture that was expressed from wound.  Instructions for painting wound daily with betadine.  She will have cardiac rehab nurses call me when is there next week for me to take a quick look at it.  She will call if it worsens before then.  She failed her nocturnal oximetry at home with > 30 minutes of sats < 88%.  She is seeing her PCP next week and will discuss transitioning her oxygen back to home and likely sleep study soon.  She is planning on returning home soon.    Post-op atrial fibrillation  Post-op respiratory insufficiency requiring oxygen with sleep periods    OK to drive if not taking narcotic pain medicine  Continue cardiac rehab  Follow-up as " scheduled with cardiology  Follow-up as scheduled with PCP  Follow-up with CT surgery prn    Continue lifting restriction of 10 lbs until 6 weeks and 50 lbs until 12 weeks from the date of surgery, no excessive jarring motions or twisting motions until 12 weeks from the date of surgery.    Thank you for allowing me to participate in the care of your patient.    Regards,  Madeline Jung, APRN

## 2024-08-02 ENCOUNTER — TREATMENT (OUTPATIENT)
Dept: CARDIAC REHAB | Facility: HOSPITAL | Age: 74
End: 2024-08-02
Payer: MEDICARE

## 2024-08-02 DIAGNOSIS — Z95.1 S/P CABG (CORONARY ARTERY BYPASS GRAFT): Primary | ICD-10-CM

## 2024-08-02 PROCEDURE — 93798 PHYS/QHP OP CAR RHAB W/ECG: CPT

## 2024-08-05 ENCOUNTER — TELEPHONE (OUTPATIENT)
Dept: CARDIAC SURGERY | Facility: CLINIC | Age: 74
End: 2024-08-05
Payer: MEDICARE

## 2024-08-05 ENCOUNTER — TREATMENT (OUTPATIENT)
Dept: CARDIAC REHAB | Facility: HOSPITAL | Age: 74
End: 2024-08-05
Payer: MEDICARE

## 2024-08-05 DIAGNOSIS — Z95.1 S/P CABG (CORONARY ARTERY BYPASS GRAFT): Primary | ICD-10-CM

## 2024-08-05 PROCEDURE — 93798 PHYS/QHP OP CAR RHAB W/ECG: CPT

## 2024-08-05 NOTE — TELEPHONE ENCOUNTER
Pt in cardiac rehab this morning.  Asked to come see pt's RLE incision.  Erythema resolved, skin peeling around SVHS, and tiny scab noted.  D/w pt and daughter.  Can stop betadine painting.

## 2024-08-06 ENCOUNTER — TREATMENT (OUTPATIENT)
Dept: CARDIAC REHAB | Facility: HOSPITAL | Age: 74
End: 2024-08-06
Payer: MEDICARE

## 2024-08-06 DIAGNOSIS — Z95.1 S/P CABG (CORONARY ARTERY BYPASS GRAFT): Primary | ICD-10-CM

## 2024-08-06 PROCEDURE — 93798 PHYS/QHP OP CAR RHAB W/ECG: CPT

## 2024-08-07 ENCOUNTER — TREATMENT (OUTPATIENT)
Dept: CARDIAC REHAB | Facility: HOSPITAL | Age: 74
End: 2024-08-07
Payer: MEDICARE

## 2024-08-07 DIAGNOSIS — Z95.1 S/P CABG (CORONARY ARTERY BYPASS GRAFT): Primary | ICD-10-CM

## 2024-08-07 PROCEDURE — 93798 PHYS/QHP OP CAR RHAB W/ECG: CPT

## 2024-08-12 ENCOUNTER — TREATMENT (OUTPATIENT)
Dept: CARDIAC REHAB | Facility: HOSPITAL | Age: 74
End: 2024-08-12
Payer: MEDICARE

## 2024-08-12 DIAGNOSIS — Z95.1 S/P CABG (CORONARY ARTERY BYPASS GRAFT): Primary | ICD-10-CM

## 2024-08-12 PROCEDURE — 93798 PHYS/QHP OP CAR RHAB W/ECG: CPT

## 2024-08-13 ENCOUNTER — TREATMENT (OUTPATIENT)
Dept: CARDIAC REHAB | Facility: HOSPITAL | Age: 74
End: 2024-08-13
Payer: MEDICARE

## 2024-08-13 DIAGNOSIS — Z95.1 S/P CABG (CORONARY ARTERY BYPASS GRAFT): Primary | ICD-10-CM

## 2024-08-13 PROCEDURE — 93798 PHYS/QHP OP CAR RHAB W/ECG: CPT

## 2024-08-14 ENCOUNTER — TREATMENT (OUTPATIENT)
Dept: CARDIAC REHAB | Facility: HOSPITAL | Age: 74
End: 2024-08-14
Payer: MEDICARE

## 2024-08-14 DIAGNOSIS — Z95.1 S/P CABG (CORONARY ARTERY BYPASS GRAFT): Primary | ICD-10-CM

## 2024-08-14 PROCEDURE — 93798 PHYS/QHP OP CAR RHAB W/ECG: CPT

## 2024-08-19 ENCOUNTER — TREATMENT (OUTPATIENT)
Dept: CARDIAC REHAB | Facility: HOSPITAL | Age: 74
End: 2024-08-19
Payer: MEDICARE

## 2024-08-19 DIAGNOSIS — Z95.1 S/P CABG (CORONARY ARTERY BYPASS GRAFT): Primary | ICD-10-CM

## 2024-08-19 PROCEDURE — 93798 PHYS/QHP OP CAR RHAB W/ECG: CPT

## 2024-08-20 ENCOUNTER — TREATMENT (OUTPATIENT)
Dept: CARDIAC REHAB | Facility: HOSPITAL | Age: 74
End: 2024-08-20
Payer: MEDICARE

## 2024-08-20 DIAGNOSIS — Z95.1 S/P CABG (CORONARY ARTERY BYPASS GRAFT): Primary | ICD-10-CM

## 2024-08-20 PROCEDURE — 93798 PHYS/QHP OP CAR RHAB W/ECG: CPT

## 2024-08-26 ENCOUNTER — TREATMENT (OUTPATIENT)
Dept: CARDIAC REHAB | Facility: HOSPITAL | Age: 74
End: 2024-08-26
Payer: MEDICARE

## 2024-08-26 DIAGNOSIS — Z95.1 S/P CABG (CORONARY ARTERY BYPASS GRAFT): Primary | ICD-10-CM

## 2024-08-26 PROCEDURE — 93798 PHYS/QHP OP CAR RHAB W/ECG: CPT

## 2024-08-27 ENCOUNTER — TELEPHONE (OUTPATIENT)
Dept: CARDIOLOGY | Facility: CLINIC | Age: 74
End: 2024-08-27
Payer: MEDICARE

## 2024-08-27 ENCOUNTER — TREATMENT (OUTPATIENT)
Dept: CARDIAC REHAB | Facility: HOSPITAL | Age: 74
End: 2024-08-27
Payer: MEDICARE

## 2024-08-27 DIAGNOSIS — Z95.1 S/P CABG (CORONARY ARTERY BYPASS GRAFT): Primary | ICD-10-CM

## 2024-08-27 PROCEDURE — 93798 PHYS/QHP OP CAR RHAB W/ECG: CPT

## 2024-08-27 NOTE — TELEPHONE ENCOUNTER
CALLED PATIENT AND LEFT MESSAGE.     SHE CAN TAKE FIRST AVAILABLE WITH DR. REINOSO OR FOLLOW UP WITH MIQUEL OR MADY.

## 2024-08-27 NOTE — TELEPHONE ENCOUNTER
Caller: Jeannie Ruiz    Relationship: Self    Best call back number: [phone]      What was the call regarding: PATIENT CALLED BACK,  SPOKE WITH MAIRA ABOUT MOVING APPOINTMENT UP TO AM TIME. PATIENT STATED THAT SHE CAN'T DO MORNING APPOINTMENT, WON'T BE IN TOWN TILL AFTERNOON, WILL NEED TO FIND ANOTHER TIME. PLEASE ADVISE. FIRST AVAILABLE IS IN NOV 2024.

## 2024-08-30 ENCOUNTER — TREATMENT (OUTPATIENT)
Dept: CARDIAC REHAB | Facility: HOSPITAL | Age: 74
End: 2024-08-30
Payer: MEDICARE

## 2024-08-30 DIAGNOSIS — Z95.1 S/P CABG (CORONARY ARTERY BYPASS GRAFT): Primary | ICD-10-CM

## 2024-08-30 PROCEDURE — 93798 PHYS/QHP OP CAR RHAB W/ECG: CPT

## 2024-09-04 ENCOUNTER — TREATMENT (OUTPATIENT)
Dept: CARDIAC REHAB | Facility: HOSPITAL | Age: 74
End: 2024-09-04
Payer: MEDICARE

## 2024-09-04 DIAGNOSIS — Z95.1 S/P CABG (CORONARY ARTERY BYPASS GRAFT): Primary | ICD-10-CM

## 2024-09-04 PROCEDURE — 93798 PHYS/QHP OP CAR RHAB W/ECG: CPT

## 2024-09-05 ENCOUNTER — TREATMENT (OUTPATIENT)
Dept: CARDIAC REHAB | Facility: HOSPITAL | Age: 74
End: 2024-09-05
Payer: MEDICARE

## 2024-09-05 DIAGNOSIS — Z95.1 S/P CABG (CORONARY ARTERY BYPASS GRAFT): Primary | ICD-10-CM

## 2024-09-05 PROCEDURE — 93798 PHYS/QHP OP CAR RHAB W/ECG: CPT

## 2024-09-11 ENCOUNTER — TREATMENT (OUTPATIENT)
Dept: CARDIAC REHAB | Facility: HOSPITAL | Age: 74
End: 2024-09-11
Payer: MEDICARE

## 2024-09-11 DIAGNOSIS — Z95.1 S/P CABG (CORONARY ARTERY BYPASS GRAFT): Primary | ICD-10-CM

## 2024-09-11 PROCEDURE — 93798 PHYS/QHP OP CAR RHAB W/ECG: CPT

## 2024-09-12 ENCOUNTER — TREATMENT (OUTPATIENT)
Dept: CARDIAC REHAB | Facility: HOSPITAL | Age: 74
End: 2024-09-12
Payer: MEDICARE

## 2024-09-12 DIAGNOSIS — Z95.1 S/P CABG (CORONARY ARTERY BYPASS GRAFT): Primary | ICD-10-CM

## 2024-09-12 PROCEDURE — 93798 PHYS/QHP OP CAR RHAB W/ECG: CPT

## 2024-09-19 ENCOUNTER — TREATMENT (OUTPATIENT)
Dept: CARDIAC REHAB | Facility: HOSPITAL | Age: 74
End: 2024-09-19
Payer: MEDICARE

## 2024-09-19 DIAGNOSIS — Z95.1 S/P CABG (CORONARY ARTERY BYPASS GRAFT): Primary | ICD-10-CM

## 2024-09-19 PROCEDURE — 93798 PHYS/QHP OP CAR RHAB W/ECG: CPT

## 2024-09-20 ENCOUNTER — APPOINTMENT (OUTPATIENT)
Dept: CARDIAC REHAB | Facility: HOSPITAL | Age: 74
End: 2024-09-20
Payer: MEDICARE

## 2024-09-20 RX ORDER — POTASSIUM CHLORIDE 1500 MG/1
20 TABLET, EXTENDED RELEASE ORAL 2 TIMES DAILY
Qty: 60 TABLET | Refills: 0 | Status: SHIPPED | OUTPATIENT
Start: 2024-09-20

## 2024-09-20 RX ORDER — FUROSEMIDE 40 MG
40 TABLET ORAL 2 TIMES DAILY
Qty: 60 TABLET | Refills: 1 | Status: SHIPPED | OUTPATIENT
Start: 2024-09-20

## 2024-09-20 RX ORDER — FUROSEMIDE 40 MG
40 TABLET ORAL 2 TIMES DAILY
Qty: 60 TABLET | Refills: 1 | OUTPATIENT
Start: 2024-09-20

## 2024-09-20 RX ORDER — POTASSIUM CHLORIDE 1500 MG/1
20 TABLET, EXTENDED RELEASE ORAL 2 TIMES DAILY
Qty: 60 TABLET | Refills: 1 | OUTPATIENT
Start: 2024-09-20

## 2024-09-23 ENCOUNTER — TELEPHONE (OUTPATIENT)
Dept: CARDIAC SURGERY | Facility: CLINIC | Age: 74
End: 2024-09-23
Payer: MEDICARE

## 2024-09-26 ENCOUNTER — APPOINTMENT (OUTPATIENT)
Dept: CARDIAC REHAB | Facility: HOSPITAL | Age: 74
End: 2024-09-26
Payer: MEDICARE

## 2024-09-27 ENCOUNTER — APPOINTMENT (OUTPATIENT)
Dept: CARDIAC REHAB | Facility: HOSPITAL | Age: 74
End: 2024-09-27
Payer: MEDICARE

## 2024-10-01 ENCOUNTER — TELEPHONE (OUTPATIENT)
Dept: CARDIAC SURGERY | Facility: CLINIC | Age: 74
End: 2024-10-01
Payer: MEDICARE

## 2024-10-01 NOTE — PROGRESS NOTES
Encounter Date:10/04/2024  Last seen 2024      Patient ID: Jeannie Ruiz is a 74 y.o. female.    Chief Complaint:  Status post CABG  Dyslipidemia  Postoperative atrial fibrillation     History of Present Illness  Patient is having some upper sternal discomfort.  Patient was scheduled to have CT scan by cardiovascular surgeon.    Since I have last seen, the patient has been without any chest discomfort ,shortness of breath, palpitations, dizziness or syncope.  Denies having any headache ,abdominal pain ,nausea, vomiting , diarrhea constipation, loss of weight or loss of appetite.  Denies having any excessive bruising ,hematuria or blood in the stool.    Review of all systems negative except as indicated.    Reviewed ROS.  Assessment and Plan      ]]]]]]]]]]]]]]]]]]]]]]]  Impression  ===========  -Status post CABG with LIMA to dual LAD, SVG to PDA and SVG to high marginal-Dr. Hall-2024.     - Preoperative non-STEMI     - Postoperative transient atrial fibrillation     - Dyslipidemia hypertension borderline diabetes.  CKD 3     - Exogenous obesity (BMI 39)     - Status post cholecystectomy.  Status post  section x 3.     - Non-smoker     - Intolerance to statins although patient is able to take lovastatin.  ============  Plan  ============  Status post CABG 2024.  Patient is not having any angina pectoris or congestive heart failure.  Incisions have healed well.     Preoperative non-STEMI.  Patient is on DAPT with aspirin and Plavix.  Discontinue Plavix since patient has been on Plavix for about 3 months after surgery.    Patient is having some upper sternal discomfort.  Patient was scheduled to have CT scan by cardiovascular surgeon.     Hypertension-well-controlled  114/70.  Patient is on Metroprolol tartrate 25 mg twice daily.     Postoperative atrial fibrillation.  Patient is maintaining sinus rhythm.  Patient is off amiodarone.    Dyslipidemia-on lovastatin.  Patient was intolerant to other  statins.  Consider Repatha by primary care.  I had a lengthy discussion regarding therapy.  Prefer to try on statin rather than going on statin and Repatha.  Okay with Repatha alone and consider dual therapy if needed in the future.     Patient is participating in cardiac rehabilitation at [facility].    Sleep study was suggested by cardiovascular surgery team.  Patient can coordinate with Dr. Beckett for Dr. Butt regarding sleep study.    Medications were reviewed and updated.  Patient is off amiodarone.  Discontinue Plavix.  Aspirin lovastatin 20 mg p.o. nightly Metroprolol tartrate 25 mg twice daily Lasix 40 mg twice daily potassium 20 mEq daily.     Follow-up in the office in 6 months.    Further plan will depend on patient's progress.    Reviewed and updated 10/4/2024.  ]]]]]]]]]]]]]]]]]]]]]]]]]                Diagnosis Plan   1. Hx of CABG        2. Dyslipidemia        3. Essential hypertension        LAB RESULTS (LAST 7 DAYS)    CBC        BMP        CMP         BNP        TROPONIN        CoAg        Creatinine Clearance  CrCl cannot be calculated (Patient's most recent lab result is older than the maximum 30 days allowed.).    ABG        Radiology  No radiology results for the last day                The following portions of the patient's history were reviewed and updated as appropriate: allergies, current medications, past family history, past medical history, past social history, past surgical history, and problem list.    Review of Systems   Constitutional: Negative for malaise/fatigue.   Cardiovascular:  Negative for chest pain, dyspnea on exertion, leg swelling and palpitations.   Respiratory:  Negative for cough and shortness of breath.    Gastrointestinal:  Negative for abdominal pain, nausea and vomiting.   Neurological:  Negative for dizziness, focal weakness, headaches, light-headedness and numbness.   All other systems reviewed and are negative.      Current Outpatient Medications:      Accu-Chek Softclix Lancets lancets, 1 each by Other route Daily. Use as instructed Dx code: E11.9, Disp: 100 each, Rfl: 2    aspirin 81 MG EC tablet, Take 1 tablet by mouth Daily., Disp: , Rfl:     Blood Glucose Monitoring Suppl (Accu-Chek Guide Me) w/Device kit, Use 1 kit Daily. Dx code: E11.9  NDC [address]-09  Bin#: [id number] Group #: [id number]  ID #: [id number]  Issuer #: [id number]), Disp: 1 kit, Rfl: 0    clopidogrel (PLAVIX) 75 MG tablet, Take 1 tablet by mouth Daily., Disp: 30 tablet, Rfl: 3    coenzyme Q10 100 MG capsule, Take 1 capsule by mouth Daily., Disp: , Rfl:     Flaxseed, Linseed, (Flax Seed Oil) 1000 MG capsule, Take 1 capsule by mouth Daily., Disp: , Rfl:     furosemide (Lasix) 40 MG tablet, Take 1 tablet by mouth 2 (Two) Times a Day., Disp: 60 tablet, Rfl: 1    glucose blood (Accu-Chek Guide) test strip, 1 each by Other route Daily. Use as instructed Dx code: E11.9, Disp: 100 each, Rfl: 2    lovastatin (MEVACOR) 20 MG tablet, Take 1 tablet by mouth Daily., Disp: , Rfl:     metoprolol tartrate (LOPRESSOR) 25 MG tablet, Take 1 tablet by mouth Every 12 (Twelve) Hours., Disp: 60 tablet, Rfl: 3    nystatin (MYCOSTATIN) 364968 UNIT/GM powder, Apply  topically to the appropriate area as directed Every 12 (Twelve) Hours. (Patient not taking: Reported on 7/18/2024), Disp: 15 g, Rfl: 0    omega-3 acid ethyl esters (LOVAZA) 1 g capsule, Take 1 capsule by mouth., Disp: , Rfl:     potassium chloride (KLOR-CON M20) 20 MEQ CR tablet, Take 1 tablet by mouth 2 (Two) Times a Day., Disp: 60 tablet, Rfl: 1    potassium chloride (KLOR-CON M20) 20 MEQ CR tablet, Take 1 tablet by mouth 2 (Two) Times a Day., Disp: 60 tablet, Rfl: 0    VITAMIN E 400 UNIT capsule, Take 1 capsule by mouth Daily., Disp: , Rfl:     Allergies   Allergen Reactions    Statins Other (See Comments)     Muscle aches         No family history on file.    Past Surgical History:   Procedure Laterality Date    APPENDECTOMY      BREAST SURGERY      COLONOSCOPY       CORONARY ARTERY BYPASS GRAFT N/A 2024    Procedure: CORONARY ARTERY BYPASS WITH INTERNAL MAMMARY ARTERY GRAFT with intraop CHARLEE;  Surgeon: Bienvenido Hall MD;  Location: [facility];  Service: Cardiothoracic;  Laterality: N/A;  CABG x3,  2 vein grafts and 1 LIMA    SKIN BIOPSY         Past Medical History:   Diagnosis Date    Coronary artery disease     Diabetes mellitus     Elevated cholesterol     Hematuria     chronic microscopic    Hypertension        No family history on file.    Social History     Socioeconomic History    Marital status:    Tobacco Use    Smoking status: Former     Current packs/day: 0.00     Types: Cigarettes     Quit date:      Years since quittin.7     Passive exposure: Never    Smokeless tobacco: Never   Vaping Use    Vaping status: Never Used   Substance and Sexual Activity    Alcohol use: Not Currently    Drug use: Never    Sexual activity: Defer           ECG 12 Lead    Date/Time: 10/4/2024 12:05 PM  Performed by: Jayjay Wright MD    Authorized by: Jayjay Wright MD  Comparison: compared with previous ECG   Similar to previous ECG  Comparison to previous ECG: Normal sinus rhythm nonspecific ST-T wave changes sinus arrhythmia 70/min left anterior fascicular block no ectopy no significant change from previous EKG.        Objective:       Physical Exam    There were no vitals taken for this visit.  The patient is alert, oriented and in no distress.    Vital signs as noted above.  Exogenous obesity (BMI 39)    Head and neck revealed no carotid bruits or jugular venous distension.  No thyromegaly or lymphadenopathy is present.    Lungs clear.  No wheezing.  Breath sounds are normal bilaterally.    Heart normal first and second heart sounds.  No murmur..  No pericardial rub is present.  No gallop is present.    Abdomen soft and nontender.  No organomegaly is present.    Extremities revealed good peripheral pulses without any pedal edema.    Skin warm and  dry.    Musculoskeletal system is grossly normal.    CNS grossly normal.    Reviewed and updated.

## 2024-10-01 NOTE — TELEPHONE ENCOUNTER
Received call from patients daughter Rowena. Per Rowena patient is having chest pain when taking a deep breath. Also calling to report that patient has a sac of fluid to left of incision, when pressing on sac leaves an indention. Requested picture be sent for evaluation. Per Rowena she is currently working, will try to send picture later this afternoon.    Also called  requesting a return call to discuss symptoms.

## 2024-10-03 DIAGNOSIS — Z95.1 S/P CABG X 3: Primary | ICD-10-CM

## 2024-10-03 DIAGNOSIS — R07.9 CHEST PAIN, UNSPECIFIED TYPE: ICD-10-CM

## 2024-10-03 NOTE — TELEPHONE ENCOUNTER
Spoke with  Joseph, Discussed 's recommendation. She is agreeable to proceed with Ct chest. Advised we would work on getting it scheduled for tomorrow after her appt with . She verbalized understanding and this was agreeable.

## 2024-10-04 ENCOUNTER — HOSPITAL ENCOUNTER (OUTPATIENT)
Dept: CT IMAGING | Facility: HOSPITAL | Age: 74
Discharge: HOME | End: 2024-10-04
Payer: MEDICARE

## 2024-10-04 ENCOUNTER — OFFICE VISIT (OUTPATIENT)
Dept: CARDIOLOGY | Facility: CLINIC | Age: 74
End: 2024-10-04
Payer: MEDICARE

## 2024-10-04 VITALS
HEIGHT: 64 IN | WEIGHT: 227.25 LBS | SYSTOLIC BLOOD PRESSURE: 140 MMHG | DIASTOLIC BLOOD PRESSURE: 82 MMHG | BODY MASS INDEX: 38.8 KG/M2 | HEART RATE: 70 BPM | OXYGEN SATURATION: 94 %

## 2024-10-04 DIAGNOSIS — Z95.1 S/P CABG X 3: ICD-10-CM

## 2024-10-04 DIAGNOSIS — I10 ESSENTIAL HYPERTENSION: ICD-10-CM

## 2024-10-04 DIAGNOSIS — E78.5 DYSLIPIDEMIA: ICD-10-CM

## 2024-10-04 DIAGNOSIS — Z95.1 HX OF CABG: Primary | ICD-10-CM

## 2024-10-04 DIAGNOSIS — R07.9 CHEST PAIN, UNSPECIFIED TYPE: ICD-10-CM

## 2024-10-04 PROCEDURE — 71250 CT THORAX DX C-: CPT

## 2024-10-04 RX ORDER — POTASSIUM CHLORIDE 1500 MG/1
20 TABLET, EXTENDED RELEASE ORAL 2 TIMES DAILY
Qty: 60 TABLET | Refills: 1 | Status: SHIPPED | OUTPATIENT
Start: 2024-10-04

## 2024-10-08 ENCOUNTER — TELEPHONE (OUTPATIENT)
Dept: CARDIAC SURGERY | Facility: CLINIC | Age: 74
End: 2024-10-08
Payer: MEDICARE

## 2024-10-08 NOTE — TELEPHONE ENCOUNTER
"CT chest was obtained d/t \"bump that popped out\" on incision  CT reviewed--cardiac imaging is fine and pt reports \"bump\" is no longer there    I did go over right breast 1.2 cm mass and prominent right axillary node noted  Pt reports she has not had a mammogram in years    We are faxing the CT report to PCP for him to order breast imaging.  Pt knows to call me if she can not get breast imaging ordered.  She verbalized understanding of findings and what the recommended studies are.  "

## 2024-10-09 ENCOUNTER — TELEPHONE (OUTPATIENT)
Dept: CARDIAC SURGERY | Facility: CLINIC | Age: 74
End: 2024-10-09
Payer: MEDICARE

## 2024-10-09 ENCOUNTER — TELEPHONE (OUTPATIENT)
Dept: CARDIOLOGY | Facility: CLINIC | Age: 74
End: 2024-10-09
Payer: MEDICARE

## 2024-10-09 RX ORDER — METOPROLOL TARTRATE 25 MG/1
25 TABLET, FILM COATED ORAL EVERY 12 HOURS SCHEDULED
Qty: 180 TABLET | Refills: 3 | Status: SHIPPED | OUTPATIENT
Start: 2024-10-09

## 2024-10-09 NOTE — TELEPHONE ENCOUNTER
Pt called and stated that Dr. Aldridge office had not received the CT scan faxed this morning refaxed CT Chest to [phone]. Pt was also wanting an ultra sound instead of Mammogram. Informed that she needed the Mammogram.

## 2024-10-09 NOTE — TELEPHONE ENCOUNTER
Rx Refill Note  Requested Prescriptions     Signed Prescriptions Disp Refills    metoprolol tartrate (LOPRESSOR) 25 MG tablet 180 tablet 3     Sig: Take 1 tablet by mouth Every 12 (Twelve) Hours.     Authorizing Provider: BEVERLY REINOSO     Ordering User: JOLENE BROWN      Last office visit with prescribing clinician: 10/4/2024   Last telemedicine visit with prescribing clinician: Visit date not found   Next office visit with prescribing clinician: 4/7/2025                         Would you like a call back once the refill request has been completed: [] Yes [] No    If the office needs to give you a call back, can they leave a voicemail: [] Yes [] No    Jolene Brown MA  10/09/24, 15:28 EDT

## 2024-10-09 NOTE — TELEPHONE ENCOUNTER
Tried to reach patients daughter Rowena to notify ct report had been received at PCP office and I had personally spoken with Cammy at PCP office.  TYRONEM for return call.

## 2024-10-09 NOTE — TELEPHONE ENCOUNTER
Pt's daughter called to say that pt's PCP hasn't scheduled pt for mammogram and wants our office aprn to place orders or help with getting this scheduled. Passed information to RN. RN is going to contact pts pcp office directly. Informed pts daughters, she verbalized understanding.

## 2024-10-09 NOTE — TELEPHONE ENCOUNTER
Spoke with DeandreJoseph, advised I had spoken with pcp office. CT report received. Cammy to give report to  for further recommendation. Also advised I had returned her daughter Rowena's call, no answer and left a message for return call.  verbalized understanding.

## 2024-10-09 NOTE — TELEPHONE ENCOUNTER
Spoke with Cammy at  office. CT report received. She will review report with . Advised ct surgery deferring recommendation of f/u with mammogram to .

## 2024-10-09 NOTE — TELEPHONE ENCOUNTER
Incoming Refill Request      Medication requested (name and dose): METOPROLOL 25 MG    Pharmacy where request should be sent: [facility]    Additional details provided by patient:     Best call back number: [phone]    Does the patient have less than a 3 day supply:  [] Yes  [x] No    Jc Vernon Rep  10/09/24, 15:26 EDT

## 2024-10-10 ENCOUNTER — APPOINTMENT (OUTPATIENT)
Dept: CARDIAC REHAB | Facility: HOSPITAL | Age: 74
End: 2024-10-10
Payer: MEDICARE

## 2024-10-15 ENCOUNTER — TELEPHONE (OUTPATIENT)
Dept: CARDIOLOGY | Facility: CLINIC | Age: 74
End: 2024-10-15
Payer: MEDICARE

## 2024-10-15 NOTE — TELEPHONE ENCOUNTER
Daughter, Rowena called to make payment for [facility] paperwork. She said she received call, but there is not a note in chart.  Is [facility] paperwork completed?   Kathleen HATCH can take payment.

## 2024-10-17 ENCOUNTER — TELEPHONE (OUTPATIENT)
Dept: CARDIOLOGY | Facility: CLINIC | Age: 74
End: 2024-10-17
Payer: MEDICARE

## 2024-10-17 NOTE — TELEPHONE ENCOUNTER
FACILITY: [facility]   DR:   PHONE: [phone]  FAX: [phone]  PROCEDURE: BREAST BIOPSY  SCHEDULED: 10/29/24  MEDS TO HOLD: PLAVIX AND ASA

## 2024-10-21 ENCOUNTER — TELEPHONE (OUTPATIENT)
Dept: CARDIOLOGY | Facility: CLINIC | Age: 74
End: 2024-10-21
Payer: MEDICARE

## 2024-10-21 NOTE — TELEPHONE ENCOUNTER
Rowena thought Dr. Wright said at last OV that patient can stop Plavix, but it's still listed in her chart.

## 2024-10-22 NOTE — TELEPHONE ENCOUNTER
"Left vm with patient - it is noted in LOV note that patient is no longer taking plavix. I went into chart and updated med list, marking plavix at \"not taking\"   "

## 2024-10-24 ENCOUNTER — TREATMENT (OUTPATIENT)
Dept: CARDIAC REHAB | Facility: HOSPITAL | Age: 74
End: 2024-10-24
Payer: MEDICARE

## 2024-10-24 DIAGNOSIS — Z95.1 S/P CABG (CORONARY ARTERY BYPASS GRAFT): Primary | ICD-10-CM

## 2024-10-24 PROCEDURE — 93798 PHYS/QHP OP CAR RHAB W/ECG: CPT

## 2024-10-25 ENCOUNTER — TREATMENT (OUTPATIENT)
Dept: CARDIAC REHAB | Facility: HOSPITAL | Age: 74
End: 2024-10-25
Payer: MEDICARE

## 2024-10-25 DIAGNOSIS — Z95.1 S/P CABG (CORONARY ARTERY BYPASS GRAFT): Primary | ICD-10-CM

## 2024-10-25 PROCEDURE — 93798 PHYS/QHP OP CAR RHAB W/ECG: CPT

## 2024-10-28 ENCOUNTER — TREATMENT (OUTPATIENT)
Dept: CARDIAC REHAB | Facility: HOSPITAL | Age: 74
End: 2024-10-28
Payer: MEDICARE

## 2024-10-28 DIAGNOSIS — Z95.1 S/P CABG (CORONARY ARTERY BYPASS GRAFT): Primary | ICD-10-CM

## 2024-10-28 PROCEDURE — 93798 PHYS/QHP OP CAR RHAB W/ECG: CPT

## 2024-10-29 ENCOUNTER — HOSPITAL ENCOUNTER (OUTPATIENT)
Dept: ULTRASOUND IMAGING | Facility: HOSPITAL | Age: 74
Discharge: HOME | End: 2024-10-29
Payer: MEDICARE

## 2024-10-29 ENCOUNTER — HOSPITAL ENCOUNTER (OUTPATIENT)
Dept: MAMMOGRAPHY | Facility: HOSPITAL | Age: 74
Discharge: HOME | End: 2024-10-29
Payer: MEDICARE

## 2024-10-29 DIAGNOSIS — R92.8 ABNORMAL MAMMOGRAM OF RIGHT BREAST: ICD-10-CM

## 2024-10-29 DIAGNOSIS — R92.8 ABNORMAL ULTRASOUND OF BREAST: ICD-10-CM

## 2024-10-29 PROCEDURE — 25010000002 LIDOCAINE 1 % SOLUTION: Performed by: INTERNAL MEDICINE

## 2024-10-29 PROCEDURE — 25010000002 LIDOCAINE 1% - EPINEPHRINE 1:100000 1 %-1:100000 SOLUTION: Performed by: INTERNAL MEDICINE

## 2024-10-29 PROCEDURE — 76098 X-RAY EXAM SURGICAL SPECIMEN: CPT

## 2024-10-29 PROCEDURE — A4648 IMPLANTABLE TISSUE MARKER: HCPCS

## 2024-10-29 PROCEDURE — 76942 ECHO GUIDE FOR BIOPSY: CPT

## 2024-10-29 RX ORDER — LIDOCAINE HYDROCHLORIDE 10 MG/ML
3 INJECTION, SOLUTION INFILTRATION; PERINEURAL ONCE
Status: COMPLETED | OUTPATIENT
Start: 2024-10-29 | End: 2024-10-29

## 2024-10-29 RX ORDER — LIDOCAINE HYDROCHLORIDE 10 MG/ML
5 INJECTION, SOLUTION INFILTRATION; PERINEURAL ONCE
Status: DISCONTINUED | OUTPATIENT
Start: 2024-10-29 | End: 2024-10-30 | Stop reason: HOSPADM

## 2024-10-29 RX ORDER — LIDOCAINE HYDROCHLORIDE AND EPINEPHRINE 10; 10 MG/ML; UG/ML
8 INJECTION, SOLUTION INFILTRATION; PERINEURAL ONCE
Status: COMPLETED | OUTPATIENT
Start: 2024-10-29 | End: 2024-10-29

## 2024-10-29 RX ORDER — LIDOCAINE HYDROCHLORIDE AND EPINEPHRINE 10; 10 MG/ML; UG/ML
10 INJECTION, SOLUTION INFILTRATION; PERINEURAL ONCE
Status: DISCONTINUED | OUTPATIENT
Start: 2024-10-29 | End: 2024-10-30 | Stop reason: HOSPADM

## 2024-10-29 RX ADMIN — LIDOCAINE HYDROCHLORIDE,EPINEPHRINE BITARTRATE 10 ML: 10; .01 INJECTION, SOLUTION INFILTRATION; PERINEURAL at 13:33

## 2024-10-29 RX ADMIN — Medication 5 ML: at 13:33

## 2024-10-30 ENCOUNTER — PATIENT OUTREACH (OUTPATIENT)
Dept: ONCOLOGY | Facility: CLINIC | Age: 74
End: 2024-10-30
Payer: MEDICARE

## 2024-10-30 ENCOUNTER — TELEPHONE (OUTPATIENT)
Dept: MAMMOGRAPHY | Facility: HOSPITAL | Age: 74
End: 2024-10-30
Payer: MEDICARE

## 2024-10-30 DIAGNOSIS — Z87.891 PERSONAL HISTORY OF TOBACCO USE: Primary | ICD-10-CM

## 2024-10-30 DIAGNOSIS — Z12.2 ENCOUNTER FOR SCREENING FOR LUNG CANCER: ICD-10-CM

## 2024-10-30 NOTE — SIGNIFICANT NOTE
Patient called to get scheduled for LDCT scan. Smoking history updated.     Low-Dose CT: Lung Cancer Screening  Criteria:  Age 50-77 years of age (CMS) , 50-80 years of age ([employer]) and;  Patient Age: 74 y.o.  20 pack-year smoking history (# yrs smoked X avg #ppd = pack/yrs); if pt has quit smoking it must be within <15yrs and;  Asymptomatic for lung cancer  ICD-10 Codes:  History of smoking  Tobacco abuse/addiction  Z72.0 (current smoker)  Z87.891 (personal history of smoking/nicotine dependence) use with CMS   Patient Smoking History  Social History     Tobacco Use   Smoking Status Former    Current packs/day: 0.00    Average packs/day: 1.5 packs/day for 14.0 years (21.0 ttl pk-yrs)    Types: Cigarettes    Start date:     Quit date:     Years since quittin.8    Passive exposure: Past   Smokeless Tobacco Never     CPT Codes:  46518 low dose (must say low dose otherwise is CT without contrast)  / (for patients with [facility] and CMS)

## 2024-10-30 NOTE — TELEPHONE ENCOUNTER
Jeannie is sore . She is going to try the ice packs again. The tegaderm is making her skin sensitive , so she is going to take it off. She has my contact information if she has any concerns.

## 2024-11-04 RX ORDER — POTASSIUM CHLORIDE 1500 MG/1
20 TABLET, EXTENDED RELEASE ORAL 2 TIMES DAILY
Qty: 180 TABLET | Refills: 3 | Status: SHIPPED | OUTPATIENT
Start: 2024-11-04

## 2024-11-05 ENCOUNTER — PATIENT OUTREACH (OUTPATIENT)
Dept: ONCOLOGY | Facility: CLINIC | Age: 74
End: 2024-11-05
Payer: MEDICARE

## 2024-11-05 ENCOUNTER — OFFICE VISIT (OUTPATIENT)
Dept: SURGERY | Facility: CLINIC | Age: 74
End: 2024-11-05
Payer: MEDICARE

## 2024-11-05 VITALS
DIASTOLIC BLOOD PRESSURE: 84 MMHG | HEART RATE: 75 BPM | WEIGHT: 225.2 LBS | BODY MASS INDEX: 38.45 KG/M2 | TEMPERATURE: 98.2 F | HEIGHT: 64 IN | OXYGEN SATURATION: 95 % | SYSTOLIC BLOOD PRESSURE: 134 MMHG

## 2024-11-05 DIAGNOSIS — Z17.0 MALIGNANT NEOPLASM OF RIGHT BREAST IN FEMALE, ESTROGEN RECEPTOR POSITIVE, UNSPECIFIED SITE OF BREAST: ICD-10-CM

## 2024-11-05 DIAGNOSIS — C50.911 MALIGNANT NEOPLASM OF RIGHT BREAST IN FEMALE, ESTROGEN RECEPTOR POSITIVE, UNSPECIFIED SITE OF BREAST: ICD-10-CM

## 2024-11-05 DIAGNOSIS — C77.3 BREAST CANCER METASTASIZED TO AXILLARY LYMPH NODE, RIGHT: Primary | ICD-10-CM

## 2024-11-05 DIAGNOSIS — C50.911 BREAST CANCER METASTASIZED TO AXILLARY LYMPH NODE, RIGHT: Primary | ICD-10-CM

## 2024-11-05 DIAGNOSIS — Z17.0 MALIGNANT NEOPLASM OF UPPER-OUTER QUADRANT OF RIGHT BREAST IN FEMALE, ESTROGEN RECEPTOR POSITIVE: Primary | ICD-10-CM

## 2024-11-05 DIAGNOSIS — C50.411 MALIGNANT NEOPLASM OF UPPER-OUTER QUADRANT OF RIGHT BREAST IN FEMALE, ESTROGEN RECEPTOR POSITIVE: Primary | ICD-10-CM

## 2024-11-05 PROCEDURE — 3075F SYST BP GE 130 - 139MM HG: CPT | Performed by: SURGERY

## 2024-11-05 PROCEDURE — 99204 OFFICE O/P NEW MOD 45 MIN: CPT | Performed by: SURGERY

## 2024-11-05 PROCEDURE — 1160F RVW MEDS BY RX/DR IN RCRD: CPT | Performed by: SURGERY

## 2024-11-05 PROCEDURE — 1159F MED LIST DOCD IN RCRD: CPT | Performed by: SURGERY

## 2024-11-05 PROCEDURE — 3078F DIAST BP <80 MM HG: CPT | Performed by: SURGERY

## 2024-11-05 RX ORDER — CHOLECALCIFEROL (VITAMIN D3) 25 MCG
1000 TABLET ORAL DAILY
COMMUNITY

## 2024-11-06 LAB
LAB AP CASE REPORT: NORMAL
LAB AP CLINICAL INFORMATION: NORMAL
LAB AP DIAGNOSIS COMMENT: NORMAL
LAB AP SPECIAL STAINS: NORMAL
PATH REPORT.FINAL DX SPEC: NORMAL
PATH REPORT.GROSS SPEC: NORMAL

## 2024-11-06 NOTE — PROGRESS NOTES
I accompanied the patient and her daughter, Rowena, to her appointment with Dr. Brower on 11/5/2024.    Dr. Brower notified the patient of her imaging and pathology results in detail and trupti diagram.  He stated that he does not have the completed pathology results and had spoken with the pathologist on the phone with a preliminary report.  It was confirmed that biopsy of the mass was consistent with invasive breast cancer and that the lymph node is positive as well.    Dr. Brower stated that her would keep them up to date on the completed results.  He suggested that the patient proceed with a Breast MRI and a PET scan.  She will also need a consult with Med Onc.    The patient was scheduled for a PET scan on 11/11/2024, Breast MRI 11/12/2024 and a consult with Dr. Alonso on 11/13/2024.  The patient will also be presented at Breast Conference on 11/13/2024.

## 2024-11-07 ENCOUNTER — PATIENT OUTREACH (OUTPATIENT)
Dept: ONCOLOGY | Facility: CLINIC | Age: 74
End: 2024-11-07
Payer: MEDICARE

## 2024-11-07 NOTE — PROGRESS NOTES
I have called and left a message with both the patient and her daughter to let them know that we have received the pathology report from her biopsies and that I would like to go over those with them.  I requested for a return call.

## 2024-11-08 ENCOUNTER — PATIENT OUTREACH (OUTPATIENT)
Dept: ONCOLOGY | Facility: CLINIC | Age: 74
End: 2024-11-08
Payer: MEDICARE

## 2024-11-08 NOTE — PROGRESS NOTES
I spoke with the patient and her daughter today.  I discussed the findings on the pathology report and notified them that the Her2 results should be reported in about 5 -10 business days and the it would take longer if needed to be sent for additional tests.    I spoke with both of them at different times and answered their questions to the best of my ability.  I requested that if they have additional questions or concerns, to please feel free to reach out.    I put together some notes for the patient along with some additional resources for her daughter to  later today.

## 2024-11-11 ENCOUNTER — HOSPITAL ENCOUNTER (OUTPATIENT)
Dept: PET IMAGING | Facility: HOSPITAL | Age: 74
Discharge: HOME | End: 2024-11-11
Admitting: SURGERY
Payer: MEDICARE

## 2024-11-11 DIAGNOSIS — C50.911 BREAST CANCER METASTASIZED TO AXILLARY LYMPH NODE, RIGHT: ICD-10-CM

## 2024-11-11 DIAGNOSIS — C50.911 MALIGNANT NEOPLASM OF RIGHT BREAST IN FEMALE, ESTROGEN RECEPTOR POSITIVE, UNSPECIFIED SITE OF BREAST: ICD-10-CM

## 2024-11-11 DIAGNOSIS — C77.3 BREAST CANCER METASTASIZED TO AXILLARY LYMPH NODE, RIGHT: ICD-10-CM

## 2024-11-11 DIAGNOSIS — Z17.0 MALIGNANT NEOPLASM OF RIGHT BREAST IN FEMALE, ESTROGEN RECEPTOR POSITIVE, UNSPECIFIED SITE OF BREAST: ICD-10-CM

## 2024-11-11 LAB — GLUCOSE BLDC GLUCOMTR-MCNC: 95 MG/DL (ref 70–105)

## 2024-11-11 PROCEDURE — A9552 F18 FDG: HCPCS | Performed by: SURGERY

## 2024-11-11 PROCEDURE — 82948 REAGENT STRIP/BLOOD GLUCOSE: CPT

## 2024-11-11 PROCEDURE — 78815 PET IMAGE W/CT SKULL-THIGH: CPT

## 2024-11-11 PROCEDURE — 0 FLUDEOXYGLUCOSE F18 SOLUTION: Performed by: SURGERY

## 2024-11-11 RX ADMIN — FLUDEOXYGLUCOSE F 18 1 DOSE: 200 INJECTION, SOLUTION INTRAVENOUS at 11:05

## 2024-11-12 ENCOUNTER — HOSPITAL ENCOUNTER (OUTPATIENT)
Dept: MRI IMAGING | Facility: HOSPITAL | Age: 74
Discharge: HOME | End: 2024-11-12
Admitting: SURGERY
Payer: MEDICARE

## 2024-11-12 DIAGNOSIS — Z17.0 MALIGNANT NEOPLASM OF UPPER-OUTER QUADRANT OF RIGHT BREAST IN FEMALE, ESTROGEN RECEPTOR POSITIVE: ICD-10-CM

## 2024-11-12 DIAGNOSIS — C50.411 MALIGNANT NEOPLASM OF UPPER-OUTER QUADRANT OF RIGHT BREAST IN FEMALE, ESTROGEN RECEPTOR POSITIVE: ICD-10-CM

## 2024-11-12 LAB
CREAT BLDA-MCNC: 1.3 MG/DL (ref 0.6–1.3)
EGFRCR SERPLBLD CKD-EPI 2021: 43 ML/MIN/1.73

## 2024-11-12 PROCEDURE — 25010000002 GADOTERIDOL PER 1 ML: Performed by: SURGERY

## 2024-11-12 PROCEDURE — C8937 CAD BREAST MRI: HCPCS

## 2024-11-12 PROCEDURE — A9579 GAD-BASE MR CONTRAST NOS,1ML: HCPCS | Performed by: SURGERY

## 2024-11-12 PROCEDURE — C8908 MRI W/O FOL W/CONT, BREAST,: HCPCS

## 2024-11-12 PROCEDURE — 82565 ASSAY OF CREATININE: CPT

## 2024-11-12 RX ADMIN — GADOTERIDOL 20 ML: 279.3 INJECTION, SOLUTION INTRAVENOUS at 14:58

## 2024-11-12 NOTE — PROGRESS NOTES
Hematology/Oncology Outpatient Consultation    Patient name: Jeannie Ruiz  : 1950  MRN: [id number]  Primary Care Physician: Seymour Aldridge MD  Referring Physician: Seymour Aldridge MD  Reason For Consult:     Chief Complaint   Patient presents with    Appointment     Low-grade ductal carcinoma in situ        History of Present Illness:    This is a 74-year-old female was referred secondary to new diagnosis of invasive breast cancer.  She had routine bilateral mammogram on 10/11/2024 which basically revealed irregular mass with spiculated margins seen in the right breast at the 9 o'clock position associated with pleomorphic calcification.  For that reason ultrasound was recommended to further evaluate..  There were fine pleomorphic calcifications with segmental distribution seen in the lateral right breast which are suspicious this spans a distance of greater than 6 cm therefore biopsy of the suspicious calcifications as well as the spiculated mass was recommended  On the ultrasound there was an irregular mass that measures 1.2 x 9 mm in size at the 9 o'clock position corresponding to the irregular mass seen on mammogram suggestive of malignancy.  There is an axillary lymph node with abnormally thickened cortex measuring more than 3 mm in size.  Ultrasound-guided core biopsy of the right axillary lymph node was recommended as well as biopsy of the calcifications in the right breast and biopsy of the irregular mass in the right breast.    10/29/2024 patient had stereotactic biopsy of the calcifications and ultrasound-guided biopsy of the breast mass as well as the axillary lymph node    Pathology revealed the right core biopsy showed low-grade ductal carcinoma in situ with microcalcifications  The right breast mass biopsy showed invasive ductal carcinoma with no evidence of lymphovascular invasion ER positive at 95%, NH positive at 100% and HER2/marysol was 2+ on immunohistochemistry equivocal FISH is  currently pending    The right axillary lymph node was positive for metastatic ductal carcinoma    She subsequently had a PET CT scan completed on 11/5/2024 which showed biopsy-proven right breast cancer mildly metabolic active and mildly hypermetabolic right axillary lymph node with biopsy clip.  No other hypermetabolic adenopathy was noted in the neck, abdomen pelvis or skeletal structure.      Patient had bilateral breast MRI which basically revealed biopsy-proven 1.7 cm mass at the 9 o'clock position on the right breast with contiguous DCIS extending at least 3 cm posteriorly otherwise no other additional abnormalities were seen in the right breast, left breast.      Family history of cancer  Patient is   She has 3 children  She does not smoke and does not drink alcohol  She retired and does for some farm work  now        Past Medical History:   Diagnosis Date    Coronary artery disease     Diabetes mellitus     Elevated cholesterol     Hematuria     chronic microscopic    Hypertension        Past Surgical History:   Procedure Laterality Date    APPENDECTOMY      BREAST SURGERY      COLONOSCOPY      CORONARY ARTERY BYPASS GRAFT N/A 6/6/2024    Procedure: CORONARY ARTERY BYPASS WITH INTERNAL MAMMARY ARTERY GRAFT with intraop CHARLEE;  Surgeon: Bienvenido Hall MD;  Location: [facility];  Service: Cardiothoracic;  Laterality: N/A;  CABG x3,  2 vein grafts and 1 LIMA    SKIN BIOPSY      US GUIDED LYMPH NODE BIOPSY  10/29/2024         Current Outpatient Medications:     Accu-Chek Softclix Lancets lancets, 1 each by Other route Daily. Use as instructed Dx code: E11.9, Disp: 100 each, Rfl: 2    aspirin 81 MG EC tablet, Take 1 tablet by mouth Daily., Disp: , Rfl:     Blood Glucose Monitoring Suppl (Accu-Chek Guide Me) w/Device kit, Use 1 kit Daily. Dx code: E11.9  NDC [address]-70  Bin#: [id number] Group #: [id number]  ID #: [id number]  Issuer #: [id number]), Disp: 1 kit, Rfl: 0    cholecalciferol (Vitamin D,  Cholecalciferol,) 25 MCG (1000 UT) tablet, Take 1 tablet by mouth Daily., Disp: , Rfl:     coenzyme Q10 100 MG capsule, Take 1 capsule by mouth Daily., Disp: , Rfl:     Flaxseed, Linseed, (Flax Seed Oil) 1000 MG capsule, Take 1 capsule by mouth Daily., Disp: , Rfl:     furosemide (Lasix) 40 MG tablet, Take 1 tablet by mouth 2 (Two) Times a Day., Disp: 60 tablet, Rfl: 1    glucose blood (Accu-Chek Guide) test strip, 1 each by Other route Daily. Use as instructed Dx code: E11.9, Disp: 100 each, Rfl: 2    lovastatin (MEVACOR) 20 MG tablet, Take 1 tablet by mouth Daily., Disp: , Rfl:     metoprolol tartrate (LOPRESSOR) 25 MG tablet, Take 1 tablet by mouth Every 12 (Twelve) Hours., Disp: 180 tablet, Rfl: 3    omega-3 acid ethyl esters (LOVAZA) 1 g capsule, Take 1 capsule by mouth., Disp: , Rfl:     potassium chloride (KLOR-CON M20) 20 MEQ CR tablet, Take 1 tablet by mouth 2 (Two) Times a Day., Disp: 180 tablet, Rfl: 3    VITAMIN E 400 UNIT capsule, Take 1 capsule by mouth Daily., Disp: , Rfl:     Allergies   Allergen Reactions    Statins Other (See Comments)     Muscle aches         Immunization History   Administered Date(s) Administered    COVID-19 (PFIZER) Purple Cap Monovalent 2021, 2021, 2021    Fluzone High-Dose 65+YRS 10/06/2021    Fluzone High-Dose 65+yrs 10/05/2020    H1N1 Inj Preservative Free 2009    Influenza Seasonal Injectable 2013, 2014, 2015       Family History   Problem Relation Age of Onset    Diabetes Mother     Heart disease Mother     Diabetes Father     Heart disease Father     Stroke Father        Cancer-related family history is not on file.    Social History     Tobacco Use    Smoking status: Former     Current packs/day: 0.00     Average packs/day: 1.5 packs/day for 14.0 years (21.0 ttl pk-yrs)     Types: Cigarettes     Start date:      Quit date:      Years since quittin.8     Passive exposure: Past    Smokeless tobacco: Never   Vaping Use  "   Vaping status: Never Used   Substance Use Topics    Alcohol use: Not Currently    Drug use: Never       ROS:    Review of Systems   Constitutional:  Negative for chills, fatigue and fever.   HENT:  Negative for congestion, drooling, ear discharge, rhinorrhea, sinus pressure and tinnitus.    Eyes:  Negative for photophobia, pain and discharge.   Respiratory:  Negative for apnea, choking and stridor.    Cardiovascular:  Negative for palpitations.   Gastrointestinal:  Negative for abdominal distention, abdominal pain and anal bleeding.   Endocrine: Negative for polydipsia and polyphagia.   Genitourinary:  Negative for decreased urine volume, flank pain and genital sores.   Musculoskeletal:  Negative for gait problem, neck pain and neck stiffness.   Skin:  Negative for color change, rash and wound.   Neurological:  Negative for tremors, seizures, syncope, facial asymmetry and speech difficulty.   Hematological:  Negative for adenopathy.   Psychiatric/Behavioral:  Negative for agitation, confusion, hallucinations and self-injury. The patient is not hyperactive.        Objective:    Vitals:    11/13/24 1451   BP: 155/76   Pulse: 66   Resp: 20   Temp: 98.2 °F (36.8 °C)   TempSrc: Infrared   SpO2: 95%   Weight: 102 kg (224 lb)   Height: 162.6 cm (64\")   PainSc: 0-No pain     Body mass index is 38.45 kg/m².    ECOG  (1) Restricted in physically strenuous activity, ambulatory and able to do work of light nature    Physical Exam:  Physical Exam  Vitals and nursing note reviewed.   Constitutional:       General: She is not in acute distress.     Appearance: She is not diaphoretic.   HENT:      Head: Normocephalic and atraumatic.   Eyes:      General: No scleral icterus.        Right eye: No discharge.         Left eye: No discharge.      Conjunctiva/sclera: Conjunctivae normal.   Neck:      Thyroid: No thyromegaly.   Cardiovascular:      Rate and Rhythm: Normal rate and regular rhythm.      Heart sounds: Normal heart sounds. "      No friction rub. No gallop.   Pulmonary:      Effort: Pulmonary effort is normal. No respiratory distress.      Breath sounds: No stridor. No wheezing.   Abdominal:      General: Bowel sounds are normal.      Palpations: Abdomen is soft. There is no mass.      Tenderness: There is no abdominal tenderness. There is no guarding or rebound.   Musculoskeletal:         General: No tenderness. Normal range of motion.      Cervical back: Normal range of motion and neck supple.   Lymphadenopathy:      Cervical: No cervical adenopathy.   Skin:     General: Skin is warm.      Findings: No erythema or rash.   Neurological:      Mental Status: She is alert and oriented to person, place, and time.      Motor: No abnormal muscle tone.   Psychiatric:         Behavior: Behavior normal.         RECENT LABS  WBC   Date Value Ref Range Status   07/23/2024 5.36 3.40 - 10.80 10*3/mm3 Final     RBC   Date Value Ref Range Status   07/23/2024 4.19 3.77 - 5.28 10*6/mm3 Final     Hemoglobin   Date Value Ref Range Status   07/23/2024 12.6 12.0 - 15.9 g/dL Final     Hematocrit   Date Value Ref Range Status   07/23/2024 40.0 34.0 - 46.6 % Final     MCV   Date Value Ref Range Status   07/23/2024 95.5 79.0 - 97.0 fL Final     MCH   Date Value Ref Range Status   07/23/2024 30.1 26.6 - 33.0 pg Final     MCHC   Date Value Ref Range Status   07/23/2024 31.5 31.5 - 35.7 g/dL Final     RDW   Date Value Ref Range Status   07/23/2024 13.6 12.3 - 15.4 % Final     RDW-SD   Date Value Ref Range Status   07/23/2024 48.1 37.0 - 54.0 fl Final     MPV   Date Value Ref Range Status   07/23/2024 9.5 6.0 - 12.0 fL Final     Platelets   Date Value Ref Range Status   07/23/2024 253 140 - 450 10*3/mm3 Final     Neutrophil %   Date Value Ref Range Status   07/23/2024 59.1 42.7 - 76.0 % Final     Lymphocyte %   Date Value Ref Range Status   07/23/2024 26.9 19.6 - 45.3 % Final     Monocyte %   Date Value Ref Range Status   07/23/2024 10.8 5.0 - 12.0 % Final      Eosinophil %   Date Value Ref Range Status   07/23/2024 2.4 0.3 - 6.2 % Final     Basophil %   Date Value Ref Range Status   07/23/2024 0.6 0.0 - 1.5 % Final     Immature Grans %   Date Value Ref Range Status   07/23/2024 0.2 0.0 - 0.5 % Final     Neutrophils, Absolute   Date Value Ref Range Status   07/23/2024 3.17 1.70 - 7.00 10*3/mm3 Final     Lymphocytes, Absolute   Date Value Ref Range Status   07/23/2024 1.44 0.70 - 3.10 10*3/mm3 Final     Monocytes, Absolute   Date Value Ref Range Status   07/23/2024 0.58 0.10 - 0.90 10*3/mm3 Final     Eosinophils, Absolute   Date Value Ref Range Status   07/23/2024 0.13 0.00 - 0.40 10*3/mm3 Final     Basophils, Absolute   Date Value Ref Range Status   07/23/2024 0.03 0.00 - 0.20 10*3/mm3 Final     Immature Grans, Absolute   Date Value Ref Range Status   07/23/2024 0.01 0.00 - 0.05 10*3/mm3 Final     nRBC   Date Value Ref Range Status   07/23/2024 0.0 0.0 - 0.2 /100 WBC Final       Lab Results   Component Value Date    GLUCOSE 92 07/23/2024    BUN 22 07/23/2024    CREATININE 1.30 11/12/2024    BCR 19.5 07/23/2024    K 3.9 07/23/2024    CO2 25.0 07/23/2024    CALCIUM 9.4 07/23/2024    ALBUMIN 4.3 07/23/2024    AST 25 07/23/2024    ALT 18 07/23/2024         Assessment & Plan   Malignant neoplasm of overlapping sites of right female breast, unspecified estrogen receptor status  - CBC & Differential      Invasive ductal carcinoma of the right breast, ER positive MI positive HER2/marysol is 2+ equivocal FISH is pending.  Clinical T1 cN1 M0.  Biopsy-proven right axillary lymph node positivity  Low-grade DCIS involving the right breast  No family history of cancer  Postmenopausal age  Recent CABG three-vessel disease  June 2024: Her cardiologist is Dr. Wright and cardiothoracic surgeon is Dr. Hall  She will a candidate for endocrine therapy  No significant family history of cancer  Social distress: None at this time.  Accompanied by her daughter for this  appointment          Plans    Await results of HER2/marysol analysis by FISH  Discussed treatment recommendations if HER2/marysol is positive which case will consider neoadjuvant chemotherapy  Discussed that we will need to collaborate with cardiology and cardiothoracic surgery if chemo will be indicated  If HER2 is negative, also reviewed the indications for Oncotype DX assay postsurgery and implications  Discussed that she would be a candidate for endocrine therapy as well  Reviewed her imaging studies progress note pathology reports.  Her case was also reviewed at the breast conference 11/13/2024  Follow-up 2 weeks postsurgery if HER2/marysol is negative  Discussed with patient and daughter  All questions answered            I spent 60 total minutes, face-to-face, caring for Jeannie cr. 90% of this time involved counseling and/or coordination of care as documented within this note.

## 2024-11-12 NOTE — PROGRESS NOTES
GENERAL SURGERY CONSULTATION NOTE    Consult requested by: Self    Patient Care Team:  Seymour Aldridge MD as PCP - General (Internal Medicine)    Reason for consult: Right-sided breast cancer    Subjective     Patient is a 74 y.o. female presents with a new right-sided breast cancer.  The patient presented to [facility] from an outside hospital with suspicious calcifications concerning for possible DCIS at the 9 o'clock position extending posteriorly from a suspicious appearing mass in the right breast.  The patient subsequently underwent stereotactic biopsy of the area of calcifications and core needle biopsy of the mass and a suspicious appearing lymph node in the right axilla.  The biopsy results demonstrate:  Specimen #1 (Breast, right, core biopsy:):  Low-grade ductal carcinoma in situ with microcalcifications  Negative for invasive carcinoma    Specimen #2 (Breast, right, core biopsy):  Invasive ductal carcinoma (tubule score 3, nuclear score 1, mitotic score 1, total 5/9), see comment  Largest tumor focus 10 mm  Focal intermediate grade ductal carcinoma in situ with microcalcifications  No lymph-vascular invasion identified    Specimen #3 (Lymph node, right axilla, core biopsy):  Positive for metastatic ductal carcinoma    These were noted to be ER positive at 95%, and WA positive at 100%.  HER2 was equivocal on IHC and FISH remains pending  Patient reports that she beginning of her periods in her early teenage years, she went through menopause around age 49.  She has been pregnant 3 times with 3 live births and no miscarriages.  Her first child was born when she was 21 years old and she did breast-feed for approximately 2 years.  She did use an IUD for birth control, and had taken estrogen replacement therapy previously.  She has previously had breast surgery before.  Family history as noted below    Review of Systems   Genitourinary:  Negative for breast discharge, breast lump and breast pain.  "  Hematological:  Negative for adenopathy.        History  Past Medical History:   Diagnosis Date    Coronary artery disease     Diabetes mellitus     Elevated cholesterol     Hematuria     chronic microscopic    Hypertension      Past Surgical History:   Procedure Laterality Date    APPENDECTOMY      BREAST SURGERY      COLONOSCOPY      CORONARY ARTERY BYPASS GRAFT N/A 2024    Procedure: CORONARY ARTERY BYPASS WITH INTERNAL MAMMARY ARTERY GRAFT with intraop CHARLEE;  Surgeon: Bienvenido Hall MD;  Location: [facility];  Service: Cardiothoracic;  Laterality: N/A;  CABG x3,  2 vein grafts and 1 LIMA    SKIN BIOPSY      US GUIDED LYMPH NODE BIOPSY  10/29/2024     Family History   Problem Relation Age of Onset    Diabetes Mother     Heart disease Mother     Diabetes Father     Heart disease Father     Stroke Father      Social History     Tobacco Use    Smoking status: Former     Current packs/day: 0.00     Average packs/day: 1.5 packs/day for 14.0 years (21.0 ttl pk-yrs)     Types: Cigarettes     Start date:      Quit date:      Years since quittin.8     Passive exposure: Past    Smokeless tobacco: Never   Vaping Use    Vaping status: Never Used   Substance Use Topics    Alcohol use: Not Currently    Drug use: Never     (Not in a hospital admission)    Allergies:  Statins    Objective     Vital Signs  /84 (Cuff Size: Large Adult)   Pulse 75   Temp 98.2 °F (36.8 °C) (Infrared)   Ht 162.6 cm (64\")   Wt 102 kg (225 lb 3.2 oz)   SpO2 95%   BMI 38.66 kg/m²      Physical Exam  Vitals reviewed. Exam conducted with a chaperone present.   Constitutional:       Appearance: She is well-developed.   HENT:      Head: Normocephalic and atraumatic.   Eyes:      Pupils: Pupils are equal, round, and reactive to light.   Cardiovascular:      Rate and Rhythm: Normal rate and regular rhythm.   Pulmonary:      Effort: Pulmonary effort is normal.      Breath sounds: Normal breath sounds.   Chest:      Comments: " Both breasts were examined in the upright position.  Both breasts exhibit pendulous morphology.  There are no overlying skin changes, skin thickening, dimpling, or rashes.  No discharge from the nipple is noted.  Within the right axilla there is a firm tender nodule consistent with the patient's biopsy-proven lymph node.  Abdominal:      General: There is no distension.      Palpations: Abdomen is soft.      Tenderness: There is no abdominal tenderness.      Hernia: No hernia is present.   Musculoskeletal:         General: Normal range of motion.      Cervical back: Normal range of motion.   Lymphadenopathy:      Cervical: No cervical adenopathy.      Upper Body:      Right upper body: Axillary adenopathy present. No supraclavicular adenopathy.      Left upper body: No supraclavicular or axillary adenopathy.   Skin:     General: Skin is warm and dry.      Findings: No rash.   Neurological:      Mental Status: She is alert and oriented to person, place, and time.         Results Review:   Lab Results (last 24 hours)       ** No results found for the last 24 hours. **          NM PET/CT Skull Base to Mid Thigh    Result Date: 11/12/2024  Impression: 1.The biopsy-proven right breast malignancy is only mildly metabolically active. There is a also mildly hypermetabolic right level 1 axillary lymph node with a biopsy clip compatible with the known axillary metastases. No other hypermetabolic adenopathy or evidence of metastatic disease in the neck, abdomen, pelvis or visualized skeletal structures.. 2.There is a 1.2 cm partially visualized density in the subcutaneous fat of the medial upper right thigh which is mildly metabolically active. This is likely related to previous greater saphenous vein harvest site for CABG. Correlation with clinical history is recommended. 3.Incidental CT findings as described above. Electronically Signed: Tru Blackwell DO  11/12/2024 10:06 AM EST  Workstation ID: [id number]       I reviewed  the patient's new imaging results and agree with the interpretation.  I reviewed the patient's other test results and agree with the interpretation    Assessment & Plan     Right-sided breast cancer    We reviewed the patient's imaging, and her pathology reports in detail.  We discussed that breast cancer is treated in a multidisciplinary fashion, with input from radiation oncology, medical oncology, and general surgery.  We discussed that the patient's case will be discussed at a multidisciplinary tumor board meeting held every other week.  We then discussed the diagnosis of breast cancer and that most breast cancers arise either from the ducts or from the lobules.  Using visual aids, we discussed the difference between invasive and in situ disease, especially whether or not the lymph nodes need to be evaluated.  Usually, with in situ disease, lymph nodes are not examined.  However, examining the lymph nodes should be considered if the area of concern is widespread or if it is high-grade.  We also discussed hormone receptors, and discussed that there are medications which can be given if the hormone receptors were positive which can be used to treat the cancer, and to provide protection in not only the breast with cancer, but the contralateral breast as well.  The surgical options were discussed with the patient which include breast conservation therapy (lumpectomy with radiation) and mastectomy.  With regards to mastectomy, we discussed that reconstruction may be performed either at the time of the surgery, or in a delayed fashion.  We discussed that the survival benefit between these 2 procedures are equivalent, and therefore they are considered oncologically appropriate.  However, some women will choose one versus the other for a multitude of factors.  There are times when a lumpectomy is not a feasible option, these include but are not limited to tumor to breast ratio, the location of the tumor, previous  radiation to the chest wall, or connective tissue disorders which would make radiation treatment ineffective.  Discussed with patient in the office that I recommend obtaining an MRI of the breast and PET scan to rule out distant metastasis.  We will await the results of the patient's HER2 testing prior to definitive surgical planning.  If MRI demonstrates that this area of calcifications in the mass can be adequately bracketed and resected the patient may be a candidate for breast conservation therapy which would be preferential given her multiple medical comorbidities.    The patient understands, and agrees to proceed with the further testing as above.      I discussed the patients findings and my recommendations with the patient and her daughter.     Lucio Brower MD  11/12/24  14:43 EST

## 2024-11-13 ENCOUNTER — CONSULT (OUTPATIENT)
Dept: ONCOLOGY | Facility: CLINIC | Age: 74
End: 2024-11-13
Payer: MEDICARE

## 2024-11-13 ENCOUNTER — PATIENT OUTREACH (OUTPATIENT)
Dept: ONCOLOGY | Facility: CLINIC | Age: 74
End: 2024-11-13
Payer: MEDICARE

## 2024-11-13 VITALS
TEMPERATURE: 98.2 F | RESPIRATION RATE: 20 BRPM | HEIGHT: 64 IN | WEIGHT: 224 LBS | HEART RATE: 66 BPM | DIASTOLIC BLOOD PRESSURE: 76 MMHG | OXYGEN SATURATION: 95 % | SYSTOLIC BLOOD PRESSURE: 155 MMHG | BODY MASS INDEX: 38.24 KG/M2

## 2024-11-13 DIAGNOSIS — C50.811 MALIGNANT NEOPLASM OF OVERLAPPING SITES OF RIGHT FEMALE BREAST, UNSPECIFIED ESTROGEN RECEPTOR STATUS: Primary | ICD-10-CM

## 2024-11-13 PROBLEM — Z12.39 BREAST CANCER SCREENING: Status: ACTIVE | Noted: 2021-07-16

## 2024-11-13 PROBLEM — N60.29 FIBROADENOSIS OF BREAST: Status: ACTIVE | Noted: 2021-06-25

## 2024-11-13 PROBLEM — N87.9 CERVICAL DYSPLASIA: Status: ACTIVE | Noted: 2021-06-25

## 2024-11-13 PROBLEM — N18.30 STAGE 3 CHRONIC KIDNEY DISEASE: Status: ACTIVE | Noted: 2024-06-04

## 2024-11-13 PROBLEM — M54.50 RECURRENT LOW BACK PAIN: Status: ACTIVE | Noted: 2021-06-25

## 2024-11-13 PROBLEM — G47.34 NOCTURNAL HYPOXEMIA: Status: ACTIVE | Noted: 2024-08-08

## 2024-11-13 PROBLEM — R74.8 ELEVATED LIVER ENZYMES: Status: ACTIVE | Noted: 2021-06-25

## 2024-11-13 PROBLEM — M19.90 OSTEOARTHRITIS: Status: ACTIVE | Noted: 2024-06-04

## 2024-11-13 PROBLEM — Z78.0 ASYMPTOMATIC POSTMENOPAUSAL STATE: Status: ACTIVE | Noted: 2023-02-07

## 2024-11-13 PROBLEM — R60.0 PERIPHERAL EDEMA: Status: ACTIVE | Noted: 2021-07-16

## 2024-11-13 PROBLEM — R74.01 TRANSAMINITIS: Status: ACTIVE | Noted: 2024-06-04

## 2024-11-13 PROBLEM — R31.9 HEMATURIA: Status: ACTIVE | Noted: 2021-06-25

## 2024-11-13 PROBLEM — R40.0 DAYTIME SOMNOLENCE: Status: ACTIVE | Noted: 2024-08-08

## 2024-11-13 PROBLEM — K63.5 COLON POLYPOSIS: Status: ACTIVE | Noted: 2022-08-04

## 2024-11-13 PROBLEM — R06.83 SNORING: Status: ACTIVE | Noted: 2024-08-08

## 2024-11-13 LAB — REF LAB TEST METHOD: NORMAL

## 2024-11-13 NOTE — LETTER
2024     Seymour Aldridge MD  [address] [place] [address] [place] [id number]    Patient: Jeannie Ruiz   YOB: 1950   Date of Visit: 2024     Dear Seymour Aldridge MD:       Thank you for referring Jeannie Ruiz to me for evaluation. Below are the relevant portions of my assessment and plan of care.    If you have questions, please do not hesitate to call me. I look forward to following Jeannie along with you.         Sincerely,        Vangie Alonso MD        CC: MD Celeste Lim, Vangie Lewis MD  24  Sign when Signing Visit   Hematology/Oncology Outpatient Consultation    Patient name: Jeannie Ruiz  : 1950  MRN: [id number]  Primary Care Physician: Seymour Aldridge MD  Referring Physician: Seymour Aldridge MD  Reason For Consult:     Chief Complaint   Patient presents with   • Appointment     Low-grade ductal carcinoma in situ        History of Present Illness:    This is a 74-year-old female was referred secondary to new diagnosis of invasive breast cancer.  She had routine bilateral mammogram on 10/11/2024 which basically revealed irregular mass with spiculated margins seen in the right breast at the 9 o'clock position associated with pleomorphic calcification.  For that reason ultrasound was recommended to further evaluate..  There were fine pleomorphic calcifications with segmental distribution seen in the lateral right breast which are suspicious this spans a distance of greater than 6 cm therefore biopsy of the suspicious calcifications as well as the spiculated mass was recommended  On the ultrasound there was an irregular mass that measures 1.2 x 9 mm in size at the 9 o'clock position corresponding to the irregular mass seen on mammogram suggestive of malignancy.  There is an axillary lymph node with abnormally thickened cortex measuring more than 3 mm in size.  Ultrasound-guided core biopsy of the right axillary lymph node was  recommended as well as biopsy of the calcifications in the right breast and biopsy of the irregular mass in the right breast.    10/29/2024 patient had stereotactic biopsy of the calcifications and ultrasound-guided biopsy of the breast mass as well as the axillary lymph node    Pathology revealed the right core biopsy showed low-grade ductal carcinoma in situ with microcalcifications  The right breast mass biopsy showed invasive ductal carcinoma with no evidence of lymphovascular invasion ER positive at 95%, SD positive at 100% and HER2/marysol was 2+ on immunohistochemistry equivocal FISH is currently pending    The right axillary lymph node was positive for metastatic ductal carcinoma    She subsequently had a PET CT scan completed on 11/5/2024 which showed biopsy-proven right breast cancer mildly metabolic active and mildly hypermetabolic right axillary lymph node with biopsy clip.  No other hypermetabolic adenopathy was noted in the neck, abdomen pelvis or skeletal structure.      Patient had bilateral breast MRI which basically revealed biopsy-proven 1.7 cm mass at the 9 o'clock position on the right breast with contiguous DCIS extending at least 3 cm posteriorly otherwise no other additional abnormalities were seen in the right breast, left breast.      Family history of cancer  Patient is   She has 3 children  She does not smoke and does not drink alcohol  She retired and does for some farm work  now        Past Medical History:   Diagnosis Date   • Coronary artery disease    • Diabetes mellitus    • Elevated cholesterol    • Hematuria     chronic microscopic   • Hypertension        Past Surgical History:   Procedure Laterality Date   • APPENDECTOMY     • BREAST SURGERY     • COLONOSCOPY     • CORONARY ARTERY BYPASS GRAFT N/A 6/6/2024    Procedure: CORONARY ARTERY BYPASS WITH INTERNAL MAMMARY ARTERY GRAFT with intraop CHARLEE;  Surgeon: Bienvenido Hall MD;  Location: [facility];  Service: Cardiothoracic;   Laterality: N/A;  CABG x3,  2 vein grafts and 1 LIMA   • SKIN BIOPSY     • US GUIDED LYMPH NODE BIOPSY  10/29/2024         Current Outpatient Medications:   •  Accu-Chek Softclix Lancets lancets, 1 each by Other route Daily. Use as instructed Dx code: E11.9, Disp: 100 each, Rfl: 2  •  aspirin 81 MG EC tablet, Take 1 tablet by mouth Daily., Disp: , Rfl:   •  Blood Glucose Monitoring Suppl (Accu-Chek Guide Me) w/Device kit, Use 1 kit Daily. Dx code: E11.9  NDC [address]-63  Bin#: [id number] Group #: [id number]  ID #: [id number]  Issuer #: [id number]), Disp: 1 kit, Rfl: 0  •  cholecalciferol (Vitamin D, Cholecalciferol,) 25 MCG (1000 UT) tablet, Take 1 tablet by mouth Daily., Disp: , Rfl:   •  coenzyme Q10 100 MG capsule, Take 1 capsule by mouth Daily., Disp: , Rfl:   •  Flaxseed, Linseed, (Flax Seed Oil) 1000 MG capsule, Take 1 capsule by mouth Daily., Disp: , Rfl:   •  furosemide (Lasix) 40 MG tablet, Take 1 tablet by mouth 2 (Two) Times a Day., Disp: 60 tablet, Rfl: 1  •  glucose blood (Accu-Chek Guide) test strip, 1 each by Other route Daily. Use as instructed Dx code: E11.9, Disp: 100 each, Rfl: 2  •  lovastatin (MEVACOR) 20 MG tablet, Take 1 tablet by mouth Daily., Disp: , Rfl:   •  metoprolol tartrate (LOPRESSOR) 25 MG tablet, Take 1 tablet by mouth Every 12 (Twelve) Hours., Disp: 180 tablet, Rfl: 3  •  omega-3 acid ethyl esters (LOVAZA) 1 g capsule, Take 1 capsule by mouth., Disp: , Rfl:   •  potassium chloride (KLOR-CON M20) 20 MEQ CR tablet, Take 1 tablet by mouth 2 (Two) Times a Day., Disp: 180 tablet, Rfl: 3  •  VITAMIN E 400 UNIT capsule, Take 1 capsule by mouth Daily., Disp: , Rfl:     Allergies   Allergen Reactions   • Statins Other (See Comments)     Muscle aches         Immunization History   Administered Date(s) Administered   • COVID-19 (PFIZER) Purple Cap Monovalent 01/24/2021, 02/16/2021, 11/17/2021   • Fluzone High-Dose 65+YRS 10/06/2021   • Fluzone High-Dose 65+yrs 10/05/2020   • H1N1 Inj Preservative Free  "2009   • Influenza Seasonal Injectable 2013, 2014, 2015       Family History   Problem Relation Age of Onset   • Diabetes Mother    • Heart disease Mother    • Diabetes Father    • Heart disease Father    • Stroke Father        Cancer-related family history is not on file.    Social History     Tobacco Use   • Smoking status: Former     Current packs/day: 0.00     Average packs/day: 1.5 packs/day for 14.0 years (21.0 ttl pk-yrs)     Types: Cigarettes     Start date:      Quit date:      Years since quittin.8     Passive exposure: Past   • Smokeless tobacco: Never   Vaping Use   • Vaping status: Never Used   Substance Use Topics   • Alcohol use: Not Currently   • Drug use: Never       ROS:    Review of Systems   Constitutional:  Negative for chills, fatigue and fever.   HENT:  Negative for congestion, drooling, ear discharge, rhinorrhea, sinus pressure and tinnitus.    Eyes:  Negative for photophobia, pain and discharge.   Respiratory:  Negative for apnea, choking and stridor.    Cardiovascular:  Negative for palpitations.   Gastrointestinal:  Negative for abdominal distention, abdominal pain and anal bleeding.   Endocrine: Negative for polydipsia and polyphagia.   Genitourinary:  Negative for decreased urine volume, flank pain and genital sores.   Musculoskeletal:  Negative for gait problem, neck pain and neck stiffness.   Skin:  Negative for color change, rash and wound.   Neurological:  Negative for tremors, seizures, syncope, facial asymmetry and speech difficulty.   Hematological:  Negative for adenopathy.   Psychiatric/Behavioral:  Negative for agitation, confusion, hallucinations and self-injury. The patient is not hyperactive.        Objective:    Vitals:    24 1451   BP: 155/76   Pulse: 66   Resp: 20   Temp: 98.2 °F (36.8 °C)   TempSrc: Infrared   SpO2: 95%   Weight: 102 kg (224 lb)   Height: 162.6 cm (64\")   PainSc: 0-No pain     Body mass index is 38.45 " kg/m².    ECOG  (1) Restricted in physically strenuous activity, ambulatory and able to do work of light nature    Physical Exam:  Physical Exam  Vitals and nursing note reviewed.   Constitutional:       General: She is not in acute distress.     Appearance: She is not diaphoretic.   HENT:      Head: Normocephalic and atraumatic.   Eyes:      General: No scleral icterus.        Right eye: No discharge.         Left eye: No discharge.      Conjunctiva/sclera: Conjunctivae normal.   Neck:      Thyroid: No thyromegaly.   Cardiovascular:      Rate and Rhythm: Normal rate and regular rhythm.      Heart sounds: Normal heart sounds.      No friction rub. No gallop.   Pulmonary:      Effort: Pulmonary effort is normal. No respiratory distress.      Breath sounds: No stridor. No wheezing.   Abdominal:      General: Bowel sounds are normal.      Palpations: Abdomen is soft. There is no mass.      Tenderness: There is no abdominal tenderness. There is no guarding or rebound.   Musculoskeletal:         General: No tenderness. Normal range of motion.      Cervical back: Normal range of motion and neck supple.   Lymphadenopathy:      Cervical: No cervical adenopathy.   Skin:     General: Skin is warm.      Findings: No erythema or rash.   Neurological:      Mental Status: She is alert and oriented to person, place, and time.      Motor: No abnormal muscle tone.   Psychiatric:         Behavior: Behavior normal.         RECENT LABS  WBC   Date Value Ref Range Status   07/23/2024 5.36 3.40 - 10.80 10*3/mm3 Final     RBC   Date Value Ref Range Status   07/23/2024 4.19 3.77 - 5.28 10*6/mm3 Final     Hemoglobin   Date Value Ref Range Status   07/23/2024 12.6 12.0 - 15.9 g/dL Final     Hematocrit   Date Value Ref Range Status   07/23/2024 40.0 34.0 - 46.6 % Final     MCV   Date Value Ref Range Status   07/23/2024 95.5 79.0 - 97.0 fL Final     MCH   Date Value Ref Range Status   07/23/2024 30.1 26.6 - 33.0 pg Final     MCHC   Date Value  Ref Range Status   07/23/2024 31.5 31.5 - 35.7 g/dL Final     RDW   Date Value Ref Range Status   07/23/2024 13.6 12.3 - 15.4 % Final     RDW-SD   Date Value Ref Range Status   07/23/2024 48.1 37.0 - 54.0 fl Final     MPV   Date Value Ref Range Status   07/23/2024 9.5 6.0 - 12.0 fL Final     Platelets   Date Value Ref Range Status   07/23/2024 253 140 - 450 10*3/mm3 Final     Neutrophil %   Date Value Ref Range Status   07/23/2024 59.1 42.7 - 76.0 % Final     Lymphocyte %   Date Value Ref Range Status   07/23/2024 26.9 19.6 - 45.3 % Final     Monocyte %   Date Value Ref Range Status   07/23/2024 10.8 5.0 - 12.0 % Final     Eosinophil %   Date Value Ref Range Status   07/23/2024 2.4 0.3 - 6.2 % Final     Basophil %   Date Value Ref Range Status   07/23/2024 0.6 0.0 - 1.5 % Final     Immature Grans %   Date Value Ref Range Status   07/23/2024 0.2 0.0 - 0.5 % Final     Neutrophils, Absolute   Date Value Ref Range Status   07/23/2024 3.17 1.70 - 7.00 10*3/mm3 Final     Lymphocytes, Absolute   Date Value Ref Range Status   07/23/2024 1.44 0.70 - 3.10 10*3/mm3 Final     Monocytes, Absolute   Date Value Ref Range Status   07/23/2024 0.58 0.10 - 0.90 10*3/mm3 Final     Eosinophils, Absolute   Date Value Ref Range Status   07/23/2024 0.13 0.00 - 0.40 10*3/mm3 Final     Basophils, Absolute   Date Value Ref Range Status   07/23/2024 0.03 0.00 - 0.20 10*3/mm3 Final     Immature Grans, Absolute   Date Value Ref Range Status   07/23/2024 0.01 0.00 - 0.05 10*3/mm3 Final     nRBC   Date Value Ref Range Status   07/23/2024 0.0 0.0 - 0.2 /100 WBC Final       Lab Results   Component Value Date    GLUCOSE 92 07/23/2024    BUN 22 07/23/2024    CREATININE 1.30 11/12/2024    BCR 19.5 07/23/2024    K 3.9 07/23/2024    CO2 25.0 07/23/2024    CALCIUM 9.4 07/23/2024    ALBUMIN 4.3 07/23/2024    AST 25 07/23/2024    ALT 18 07/23/2024         Assessment & Plan  Malignant neoplasm of overlapping sites of right female breast, unspecified estrogen  receptor status  - CBC & Differential      Invasive ductal carcinoma of the right breast, ER positive OR positive HER2/marysol is 2+ equivocal FISH is pending.  Clinical T1 cN1 M0.  Biopsy-proven right axillary lymph node positivity  Low-grade DCIS involving the right breast  No family history of cancer  Postmenopausal age  Recent CABG three-vessel disease  June 2024: Her cardiologist is Dr. Wright and cardiothoracic surgeon is Dr. Hall  She will a candidate for endocrine therapy  No significant family history of cancer  Social distress: None at this time.  Accompanied by her daughter for this appointment          Plans    Await results of HER2/marysol analysis by FISH  Discussed treatment recommendations if HER2/marysol is positive which case will consider neoadjuvant chemotherapy  Discussed that we will need to collaborate with cardiology and cardiothoracic surgery if chemo will be indicated  If HER2 is negative, also reviewed the indications for Oncotype DX assay postsurgery and implications  Discussed that she would be a candidate for endocrine therapy as well  Reviewed her imaging studies progress note pathology reports.  Her case was also reviewed at the breast conference 11/13/2024  Follow-up 2 weeks postsurgery if HER2/marysol is negative  Discussed with patient and daughter  All questions answered            I spent 60 total minutes, face-to-face, caring for Jeannie today. 90% of this time involved counseling and/or coordination of care as documented within this note.

## 2024-11-14 ENCOUNTER — PATIENT ROUNDING (BHMG ONLY) (OUTPATIENT)
Dept: SURGERY | Facility: CLINIC | Age: 74
End: 2024-11-14
Payer: MEDICARE

## 2024-11-14 LAB
LAB AP CASE REPORT: NORMAL
LAB AP CLINICAL INFORMATION: NORMAL
LAB AP DIAGNOSIS COMMENT: NORMAL
LAB AP FISH HER2/NEU REPORT,ADDENDUM: NORMAL
LAB AP IHC HER2/NEU REPORT,ADDENDUM: NORMAL
LAB AP SPECIAL STAINS: NORMAL
PATH REPORT.FINAL DX SPEC: NORMAL
PATH REPORT.GROSS SPEC: NORMAL

## 2024-11-14 NOTE — PROGRESS NOTES
I accompanied the patient and her daughter to her appointment with Dr. Alonso.    Dr. Alonso notified the patient that we do not have the patient's completed Her2 results.      She discussed that if the Her2 results come back positive, then there would be a role for chemotherapy.  If the Her2 results come back negative, she will proceed with surgery and there would be a role for Oncotype after surgery to decide if Chemotherapy is needed.    I spent time with the patient and her daughter discussing the plan of treatment depending on the Her2 results.

## 2024-11-15 ENCOUNTER — PATIENT OUTREACH (OUTPATIENT)
Dept: ONCOLOGY | Facility: CLINIC | Age: 74
End: 2024-11-15
Payer: MEDICARE

## 2024-11-15 PROBLEM — Z17.0 MALIGNANT NEOPLASM OF UPPER-OUTER QUADRANT OF RIGHT BREAST IN FEMALE, ESTROGEN RECEPTOR POSITIVE: Status: ACTIVE | Noted: 2024-11-05

## 2024-11-15 PROBLEM — C50.411 MALIGNANT NEOPLASM OF UPPER-OUTER QUADRANT OF RIGHT BREAST IN FEMALE, ESTROGEN RECEPTOR POSITIVE: Status: ACTIVE | Noted: 2024-11-05

## 2024-11-15 RX ORDER — SODIUM CHLORIDE 0.9 % (FLUSH) 0.9 %
10 SYRINGE (ML) INJECTION AS NEEDED
OUTPATIENT
Start: 2024-11-15

## 2024-11-15 RX ORDER — SODIUM CHLORIDE 9 MG/ML
40 INJECTION, SOLUTION INTRAVENOUS AS NEEDED
OUTPATIENT
Start: 2024-11-15

## 2024-11-15 RX ORDER — SODIUM CHLORIDE 0.9 % (FLUSH) 0.9 %
10 SYRINGE (ML) INJECTION EVERY 12 HOURS SCHEDULED
OUTPATIENT
Start: 2024-11-15

## 2024-11-15 RX ORDER — LIDOCAINE 40 MG/G
1 CREAM TOPICAL ONCE
OUTPATIENT
Start: 2024-11-15 | End: 2024-11-15

## 2024-11-15 NOTE — PROGRESS NOTES
I called and left a message requesting that the patient call to discuss her Her2 results.    I spoke with both the patient and her daughter a different times today and notified them that the patient's Her2 results were negative and that she could proceed with surgery.    I let her know that the Dr. Brower's office would be calling to schedule surgery and it may be Monday before they call.    We discussed the treatment time line with Surgery, Oncotype DX, and +/-Chemotherapy and XRT.

## 2024-11-19 ENCOUNTER — TELEPHONE (OUTPATIENT)
Dept: CARDIOLOGY | Facility: CLINIC | Age: 74
End: 2024-11-19
Payer: MEDICARE

## 2024-11-19 ENCOUNTER — TELEPHONE (OUTPATIENT)
Dept: CARDIAC SURGERY | Facility: CLINIC | Age: 74
End: 2024-11-19
Payer: MEDICARE

## 2024-11-19 ENCOUNTER — PATIENT OUTREACH (OUTPATIENT)
Dept: ONCOLOGY | Facility: CLINIC | Age: 74
End: 2024-11-19
Payer: MEDICARE

## 2024-11-19 RX ORDER — FUROSEMIDE 40 MG/1
40 TABLET ORAL 2 TIMES DAILY
Qty: 60 TABLET | Refills: 1 | Status: SHIPPED | OUTPATIENT
Start: 2024-11-19

## 2024-11-19 NOTE — PROGRESS NOTES
I received a call from the patient's daughter, Rowena, stating that she and the patient are upset because they had received a call today stating that she was scheduled for surgery on Thursday.    Rowena stated that the patient still had a few questions that she would like answered with Dr. Brower prior to surgery.    She also stated that she would like to discuss Radiation with the Radiation Oncologist prior to making her surgery decision.    Dr. Brower was notified of the patient's concerns.    The patient has been scheduled for a follow up appointment with both Dr. Brower and Dr. Lovelace.

## 2024-11-19 NOTE — TELEPHONE ENCOUNTER
Patient just found out she has breast cancer. Patient had CABG in June 2024. Patient/Rowena just wondering if heart is ok to do radiation and chemo if needed.       Please advise.

## 2024-11-19 NOTE — TELEPHONE ENCOUNTER
Patient's daughter called to report that patient has been diagnosed with breast cancer recently. Patient and patient's daughter questioning whether it is safe for her to have surgery and chemo if necessary. Discussed with ARLIN Correa, who recommends she proceed with breast surgery. Patient does not have any restrictions from an open heart standpoint. Patient and daughter verbalized understanding.

## 2024-11-19 NOTE — TELEPHONE ENCOUNTER
Rx Refill Note  Requested Prescriptions     Pending Prescriptions Disp Refills    furosemide (Lasix) 40 MG tablet 60 tablet 1     Sig: Take 1 tablet by mouth 2 (Two) Times a Day.      Last office visit with prescribing clinician: 10/4/2024   Last telemedicine visit with prescribing clinician: Visit date not found   Next office visit with prescribing clinician: 4/7/2025                         Would you like a call back once the refill request has been completed: [] Yes [] No    If the office needs to give you a call back, can they leave a voicemail: [] Yes [] No    Melissa Freed MA  11/19/24, 14:02 EST

## 2024-11-19 NOTE — TELEPHONE ENCOUNTER
Requesting refill on Furosemide 40 mg. Please send to [facility] pharmacy.       Patient just found out she has breast cancer. Patient had CABG in June 2024.  Patient/Rowena just wondering if heart is ok to do radiation and chemo if needed.

## 2024-11-20 ENCOUNTER — TELEPHONE (OUTPATIENT)
Dept: CARDIOLOGY | Facility: CLINIC | Age: 74
End: 2024-11-20
Payer: MEDICARE

## 2024-11-21 ENCOUNTER — TELEPHONE (OUTPATIENT)
Dept: CARDIOLOGY | Facility: CLINIC | Age: 74
End: 2024-11-21
Payer: MEDICARE

## 2024-11-21 NOTE — TELEPHONE ENCOUNTER
FACILITY: [facility]  DR: JONAH  PHONE: [phone]  FAX: [phone]  PROCEDURE: BREAST SURGERY  SCHEDULED: TBD  MEDS TO HOLD: ASPIRIN    PLACED ON PROVIDERS DESK FOR REVIEW.    Office Visit with Jayjay Wright MD (10/04/2024)

## 2024-11-21 NOTE — TELEPHONE ENCOUNTER
Spoke to patients daughter and understood.     Patient wanted to schedule an appointment for further questions for Dr. Wright. Transferred patients daughter to scheduling to make an appointment.

## 2024-11-22 LAB
LAB AP CASE REPORT: NORMAL
LAB AP CLINICAL INFORMATION: NORMAL
LAB AP DIAGNOSIS COMMENT: NORMAL
LAB AP FISH HER2/NEU REPORT,ADDENDUM: NORMAL
LAB AP IHC HER2/NEU REPORT,ADDENDUM: NORMAL
LAB AP SPECIAL STAINS: NORMAL
PATH REPORT.ADDENDUM SPEC: NORMAL
PATH REPORT.FINAL DX SPEC: NORMAL
PATH REPORT.GROSS SPEC: NORMAL

## 2024-11-26 ENCOUNTER — OFFICE VISIT (OUTPATIENT)
Dept: SURGERY | Facility: CLINIC | Age: 74
End: 2024-11-26
Payer: MEDICARE

## 2024-11-26 VITALS
HEART RATE: 67 BPM | DIASTOLIC BLOOD PRESSURE: 97 MMHG | SYSTOLIC BLOOD PRESSURE: 164 MMHG | TEMPERATURE: 98.6 F | OXYGEN SATURATION: 96 % | HEIGHT: 64 IN | RESPIRATION RATE: 18 BRPM | BODY MASS INDEX: 38.45 KG/M2 | WEIGHT: 225.2 LBS

## 2024-11-26 DIAGNOSIS — C50.411 MALIGNANT NEOPLASM OF UPPER-OUTER QUADRANT OF RIGHT BREAST IN FEMALE, ESTROGEN RECEPTOR POSITIVE: Primary | ICD-10-CM

## 2024-11-26 DIAGNOSIS — Z17.0 MALIGNANT NEOPLASM OF UPPER-OUTER QUADRANT OF RIGHT BREAST IN FEMALE, ESTROGEN RECEPTOR POSITIVE: Primary | ICD-10-CM

## 2024-11-26 DIAGNOSIS — C50.911 BREAST CANCER METASTASIZED TO AXILLARY LYMPH NODE, RIGHT: ICD-10-CM

## 2024-11-26 DIAGNOSIS — C77.3 BREAST CANCER METASTASIZED TO AXILLARY LYMPH NODE, RIGHT: ICD-10-CM

## 2024-11-26 PROCEDURE — 3077F SYST BP >= 140 MM HG: CPT | Performed by: SURGERY

## 2024-11-26 PROCEDURE — 1160F RVW MEDS BY RX/DR IN RCRD: CPT | Performed by: SURGERY

## 2024-11-26 PROCEDURE — 99214 OFFICE O/P EST MOD 30 MIN: CPT | Performed by: SURGERY

## 2024-11-26 PROCEDURE — 1159F MED LIST DOCD IN RCRD: CPT | Performed by: SURGERY

## 2024-11-26 PROCEDURE — 3080F DIAST BP >= 90 MM HG: CPT | Performed by: SURGERY

## 2024-11-26 RX ORDER — EVOLOCUMAB 140 MG/ML
140 INJECTION, SOLUTION SUBCUTANEOUS
COMMUNITY

## 2024-11-27 ENCOUNTER — OFFICE VISIT (OUTPATIENT)
Dept: CARDIOLOGY | Facility: CLINIC | Age: 74
End: 2024-11-27
Payer: MEDICARE

## 2024-11-27 VITALS
DIASTOLIC BLOOD PRESSURE: 84 MMHG | HEIGHT: 64 IN | BODY MASS INDEX: 38.41 KG/M2 | WEIGHT: 225 LBS | OXYGEN SATURATION: 97 % | HEART RATE: 70 BPM | SYSTOLIC BLOOD PRESSURE: 133 MMHG

## 2024-11-27 DIAGNOSIS — Z95.1 HX OF CABG: Primary | ICD-10-CM

## 2024-11-27 DIAGNOSIS — E78.5 DYSLIPIDEMIA: ICD-10-CM

## 2024-11-27 DIAGNOSIS — I10 ESSENTIAL HYPERTENSION: ICD-10-CM

## 2024-11-27 NOTE — PROGRESS NOTES
Encounter Date:2024  Last seen 10/4/2024      Patient ID: Jeannie Ruiz is a 74 y.o. female.    Chief Complaint:  Status post CABG  Dyslipidemia  Postoperative atrial fibrillation     History of Present Illness  Patient is being worked up for right breast carcinoma.    Since I have last seen, the patient has been without any chest discomfort ,shortness of breath, palpitations, dizziness or syncope.  Denies having any headache ,abdominal pain ,nausea, vomiting , diarrhea constipation, loss of weight or loss of appetite.  Denies having any excessive bruising ,hematuria or blood in the stool.    Review of all systems negative except as indicated.    Reviewed ROS.  Assessment and Plan      ]]]]]]]]]]]]]]]]]]]]]]]  Impression  ===========  -Status post CABG with LIMA to dual LAD, SVG to PDA and SVG to high marginal-Dr. Hall-2024.     - Preoperative non-STEMI     - Postoperative transient atrial fibrillation     - Dyslipidemia hypertension borderline diabetes.  CKD 3     - Exogenous obesity (BMI 39)     - Status post cholecystectomy.  Status post  section x 3.     - Non-smoker     - Intolerance to statins although patient is able to take lovastatin.  ============  Plan  ============  Status post CABG 2024.  Patient is not having any angina pectoris or congestive heart failure.     Preoperative non-STEMI.  Patient is off Plavix now.      Hypertension-well-controlled  133/84  Patient is on Metroprolol tartrate 25 mg twice daily.     Postoperative atrial fibrillation.  Patient is maintaining sinus rhythm.  Patient is off amiodarone.     Dyslipidemia-on Repatha.    Patient is being worked up for right breast carcinoma.    Patient is [facility] in cardiac [facility] at [facility].      Medications were reviewed and updated.  Patient is off amiodarone.  Patient is off Plavix.  Aspirin lovastatin 20 mg p.o. nightly Metroprolol tartrate 25 mg twice daily Lasix 40 mg twice daily  potassium 20 mEq daily.     Follow-up in the office in 6 months.     Further plan will depend on patient's progress.     Reviewed and updated 11/27/2024.  ]]]]]]]]]]]]]]]]]]]]]]]]]               Diagnosis Plan   1. Hx of CABG        2. Dyslipidemia        3. Essential hypertension        LAB RESULTS (LAST 7 DAYS)    CBC        BMP        CMP         BNP        TROPONIN        CoAg        Creatinine Clearance  Estimated Creatinine Clearance: 43.4 mL/min (by C-G formula based on SCr of 1.3 mg/dL).    ABG        Radiology  No radiology results for the last day                The following portions of the patient's history were reviewed and updated as appropriate: allergies, current medications, past family history, past medical history, past social history, past surgical history, and problem list.    Review of Systems   Constitutional: Positive for malaise/fatigue.   Cardiovascular:  Positive for leg swelling and palpitations. Negative for chest pain and dyspnea on exertion.   Respiratory:  Negative for cough and shortness of breath.    Gastrointestinal:  Negative for abdominal pain, nausea and vomiting.   Neurological:  Negative for dizziness, focal weakness, headaches, light-headedness and numbness.   All other systems reviewed and are negative.        Current Outpatient Medications:     Accu-Chek Softclix Lancets lancets, 1 each by Other route Daily. Use as instructed Dx code: E11.9, Disp: 100 each, Rfl: 2    aspirin 81 MG EC tablet, Take 1 tablet by mouth Daily., Disp: , Rfl:     Blood Glucose Monitoring Suppl (Accu-Chek Guide Me) w/Device kit, Use 1 kit Daily. Dx code: E11.9  NDC [address]-70  Bin#: [id number] Group #: [id number]  ID #: [id number]  Issuer #: [id number]), Disp: 1 kit, Rfl: 0    cholecalciferol (Vitamin D, Cholecalciferol,) 25 MCG (1000 UT) tablet, Take 1 tablet by mouth Daily., Disp: , Rfl:     coenzyme Q10 100 MG capsule, Take 1 capsule by mouth Daily., Disp: , Rfl:     Evolocumab (Repatha) solution  prefilled syringe injection, Inject 1 mL under the skin into the appropriate area as directed., Disp: , Rfl:     Flaxseed, Linseed, (Flax Seed Oil) 1000 MG capsule, Take 1 capsule by mouth Daily., Disp: , Rfl:     furosemide (Lasix) 40 MG tablet, Take 1 tablet by mouth 2 (Two) Times a Day., Disp: 60 tablet, Rfl: 1    glucose blood (Accu-Chek Guide) test strip, 1 each by Other route Daily. Use as instructed Dx code: E11.9, Disp: 100 each, Rfl: 2    lovastatin (MEVACOR) 20 MG tablet, Take 1 tablet by mouth Daily., Disp: , Rfl:     metoprolol tartrate (LOPRESSOR) 25 MG tablet, Take 1 tablet by mouth Every 12 (Twelve) Hours., Disp: 180 tablet, Rfl: 3    omega-3 acid ethyl esters (LOVAZA) 1 g capsule, Take 1 capsule by mouth., Disp: , Rfl:     potassium chloride (KLOR-CON M20) 20 MEQ CR tablet, Take 1 tablet by mouth 2 (Two) Times a Day., Disp: 180 tablet, Rfl: 3    VITAMIN E 400 UNIT capsule, Take 1 capsule by mouth Daily., Disp: , Rfl:     Allergies   Allergen Reactions    Statins Other (See Comments)     Muscle aches         Family History   Problem Relation Age of Onset    Diabetes Mother     Heart disease Mother     Diabetes Father     Heart disease Father     Stroke Father        Past Surgical History:   Procedure Laterality Date    APPENDECTOMY      BREAST SURGERY      COLONOSCOPY      CORONARY ARTERY BYPASS GRAFT N/A 6/6/2024    Procedure: CORONARY ARTERY BYPASS WITH INTERNAL MAMMARY ARTERY GRAFT with intraop CHARLEE;  Surgeon: Bienvenido Hall MD;  Location: [facility];  Service: Cardiothoracic;  Laterality: N/A;  CABG x3,  2 vein grafts and 1 LIMA    SKIN BIOPSY      US GUIDED LYMPH NODE BIOPSY  10/29/2024       Past Medical History:   Diagnosis Date    Coronary artery disease     Diabetes mellitus     Elevated cholesterol     Hematuria     chronic microscopic    Hypertension        Family History   Problem Relation Age of Onset    Diabetes Mother     Heart disease Mother     Diabetes Father     Heart disease  "Father     Stroke Father        Social History     Socioeconomic History    Marital status:    Tobacco Use    Smoking status: Former     Current packs/day: 0.00     Average packs/day: 1.5 packs/day for 14.0 years (21.0 ttl pk-yrs)     Types: Cigarettes     Start date:      Quit date:      Years since quittin.9     Passive exposure: Past    Smokeless tobacco: Never   Vaping Use    Vaping status: Never Used   Substance and Sexual Activity    Alcohol use: Not Currently    Drug use: Never    Sexual activity: Defer         Procedures      Objective:       Physical Exam    /84 (BP Location: Right arm, Patient Position: Sitting, Cuff Size: Adult)   Pulse 70   Ht 162.6 cm (64\")   Wt 102 kg (225 lb)   SpO2 97%   BMI 38.62 kg/m²   The patient is alert, oriented and in no distress.    Vital signs as noted above.    Head and neck revealed no carotid bruits or jugular venous distension.  No thyromegaly or lymphadenopathy is present.    Lungs clear.  No wheezing.  Breath sounds are normal bilaterally.    Heart normal first and second heart sounds.  No murmur..  No pericardial rub is present.  No gallop is present.    Abdomen soft and nontender.  No organomegaly is present.    Extremities revealed good peripheral pulses without any pedal edema.    Skin warm and dry.    Musculoskeletal system is grossly normal.    CNS grossly normal.    Reviewed and updated.        "

## 2024-12-03 NOTE — PROGRESS NOTES
[facility] RADIATION ONCOLOGY  CONSULTATION NOTE    NAME: Jeannie Ruiz  YOB: 1950  MRN #: [id number]  DATE OF SERVICE: 12/4/2024  REFERRING PROVIDER: Seymour Aldridge MD   PRIMARY CARE PROVIDER: Seymour Aldridge MD    CHIEF COMPLAINT:  Right breast cancer    DIAGNOSIS:    Encounter Diagnosis   Name Primary?    Malignant neoplasm of upper-outer quadrant of right breast in female, estrogen receptor positive Yes       Cancer Staging   Stage IB (cT1c, cN1(f), cM0, G1, ER+, WA+, HER2-) invasive ductal carcinoma of the right breast         Pacemaker?:  no   Prior XRT?:  no   Contraindications?:  no  Pregnancy Test?:  No, patient is female and >55 years and/or has undergone hysterectomy       HISTORY OF PRESENT ILLNESS     Jeannie Ruiz is a 74 y.o. female with a history of NSTEMI, multivessel coronary artery disease and coronary artery bypass with a left internal mammary artery graft on 6/7/2024 who presents with a Stage IB (cT1c, cN1(f), cM0, G1, ER+, WA+, HER2-) invasive ductal carcinoma of the right breast. She presents before surgery to discuss the role of radiation therapy in the management of her disease.     10/4/2024 A CT of the chest was performed due to complaints of chest pain in setting of recent cardiac surgery. Imaging showed an incidental 1.2 cm mass in the right breast and an enlarged right axillary lymph node that measured 1.3 cm in short axis.  Diagnostic breast imaging was recommended.    10/11/2024 The patient had a diagnostic mammogram. Imaging showed fine pleomorphic calcifications with segmental distribution in the lateral right breast. They were suspicious and associated with a spiculated mass at the 9 o'clock position in the right breast. The pleomorphic calcifications extended in a segmental distribution from the mass measuring greater than 6 cm in diameter on the cc magnification view.  Biopsy was recommended.    Ultrasound also demonstrated an irregular  mass that measured 12 x 9 mm in the right breast at the 9 o'clock position 8 cm from the nipple.  This corresponded to the irregular mass with spiculated margins seen on mammogram.  This was suggestive of malignancy.  Ultrasound-guided biopsy was recommended.  An axillary lymph node was also seen in the right breast which had an abnormally thickened hypoechoic cortex that measured greater than 3 mm in thickness.  Ultrasound-guided biopsy was recommended of this lymph node as well.    10/29/2024 US guided and stereotactic biopsies were performed.  Pathology showed low-grade ductal carcinoma in situ with microcalcifications in the right breast core biopsy.  This was negative for invasive carcinoma.  A second right breast core biopsy showed invasive ductal carcinoma with the largest tumor focus measuring 10 mm with focal intermediate grade ductal carcinoma in situ present there was no LVSI identified.  The right axillary lymph node biopsy was also positive for metastatic ductal carcinoma.    11/12/2024 The patient met with Dr. Brower.  They discussed surgical options including breast conservation therapy and mastectomy.  He also recommended MRI of the breast and a PET scan to rule out distant metastasis.    11/12/2024 PET CT and MRI of the breasts were performed. PET CT mild hypermetabolism at the site of the right breast biopsy-proven malignancy.  There was also mild hypermetabolic activity at the right level 1 axillary lymph node with biopsy clip.  There was no additional hyper metabolic adenopathy.  There was no other findings concerning for metastatic disease. The tumor stained ER+, OR+, HER2-.    MRI of the breast showed a 1.7 cm malignant mass in the 9 o'clock position at the middle third of the right breast with continuous DCIS extending at least 3 cm posterior to this malignant mass.  The imaging report also discussed some of the biopsy-proven DCIS was MRI occult and the extent of disease may be  underestimated by MRI.  Imaging was consistent with a single biopsy-proven right axillary metastasis.  There were no additional suspicious enhancing lesions seen elsewhere in the bilateral breast tissues.    11/13/2024 The patient met with Dr. Alonso. They discussed the potential role of systemic therapy depending on the final FISH HER2 finding (now shown to be negative) and based on oncootype testing.     11/19/2024 the patient met with Dr. Roper at [facility] for second opinion.  The patient was referred to medical oncology and radiation oncology but has yet to meet with these providers.  They discussed surgical management of her breast cancer as well as radiation therapy and endocrine therapy.  They discussed the possibility of systemic therapy depending on the patient's Oncotype score and encouraged the patient to further discuss this with medical oncology.    Today the patient presents to discuss the role of radiation therapy in the management of her disease.  She requested a presurgical evaluation to learn more about radiation therapy, its indication, and potential side effects of treatment.  The patient has some understandable concerns about proceeding with radiation therapy given her cardiac history.       IMAGING     MRI Breast Bilateral Diagnostic W WO Contrast  Result Date: 11/12/2024  1. Biopsy-proven 1.7 cm malignant mass in the 9:00 middle third of the right breast, with contiguous DCIS extending at least 3 cm posterior to this malignant mass as described above. Please note that some of the biopsy-proven DCIS is MR occult and extent of disease may be underestimated by MRI. This may preclude breast conservation. However if preoperative localization is indicated, please note that the residual calcifications should be targeted given that the biopsy clip migrated 2 cm medial to the known DCIS. 2. No MR evidence of malignancy elsewhere in the right breast 3. No MR evidence of malignancy within the left  breast. 4. Single biopsy-proven right axillary metastasis. 5. Nondiagnostic evaluation of the sternum and internal mammary chain due to sternotomy wire artifact. No evidence of internal mammary metastasis is seen on recent chest CT or PET/CT.  BIRADS ASSESSMENT:Category 6: Known Biopsy-Proven Malignancy  Electronically Signed By-Carlos Claire On:11/12/2024 4:32 PM      NM PET/CT Skull Base to Mid Thigh  Result Date: 11/12/2024  Impression: 1.The biopsy-proven right breast malignancy is only mildly metabolically active. There is a also mildly hypermetabolic right level 1 axillary lymph node with a biopsy clip compatible with the known axillary metastases. No other hypermetabolic adenopathy or evidence of metastatic disease in the neck, abdomen, pelvis or visualized skeletal structures.. 2.There is a 1.2 cm partially visualized density in the subcutaneous fat of the medial upper right thigh which is mildly metabolically active. This is likely related to previous greater saphenous vein harvest site for CABG. Correlation with clinical history is recommended. 3.Incidental CT findings as described above. Electronically Signed: Tru Blackwell DO  11/12/2024 10:06 AM EST  Workstation ID: [id number]    Mammo Stereotactic Breast Biopsy Initial With & Without Device  US Guided Breast Biopsy With & Without Device initial  US Guided Lymph Node Biopsy  Addendum Date: 11/7/2024    Pathology results from biopsies performed by Dr. Claire are available.  Specimen #1 (Breast, right, core biopsy:): Low-grade ductal carcinoma in situ with microcalcifications Negative for invasive carcinoma  Specimen #2 (Breast, right, core biopsy): Invasive ductal carcinoma (tubule score 3, nuclear score 1, mitotic score 1, total 5/9), see comment Largest tumor focus 10 mm Focal intermediate grade ductal carcinoma in situ with microcalcifications No lymph-vascular invasion identified  Specimen #3 (Lymph node, right axilla, core biopsy): Positive for  metastatic ductal carcinoma  These results are concordant with imaging findings. Recommend surgical/oncologic management.    Electronically Signed By-Allie Khan MD On:11/7/2024 4:27 PM    Result Date: 11/7/2024  Technically successful stereotactic biopsy of the right breast, with migration of the biopsy clip as described above. This should be noted at the time of any planned preoperative localization.  RECOMMENDATIONS: 1. After final pathology has been reviewed, an addendum will be dictated for concordance and further recommendations. 2. Clip migration as above.   Electronically Signed By-Carlos Claire On:10/29/2024 2:57 PM      Mammo Stereotactic Breast Specimen Right  Result Date: 10/29/2024  Technically successful stereotactic biopsy of the right breast, with migration of the biopsy clip as described above. This should be noted at the time of any planned preoperative localization.  RECOMMENDATIONS: 1. After final pathology has been reviewed, an addendum will be dictated for concordance and further recommendations. 2. Clip migration as above.  Electronically Signed By-Carlos Claire On:10/29/2024 2:56 PM      US breast/axilla limited right  Result Date: 10/11/2024  Impression: Finding 1:  Irregular mass in the right breast is highly suggestive of malignancy. Biopsy is indicated. Finding 2:  Axillary lymph node in the right breast is suspicious. Biopsy is indicated. Finding 3:  Calcifications in the right breast are suspicious. Biopsy is indicated. BI-RADS Category 5: Highly Suggestive for Malignancy COPIES TO: Electronically Signed By: Landy Katz M.D. Date signed: 10/11/2024 at 12:14:46    Mammogram tomosynthesis diagnostic bilateral  Result Date: 10/11/2024  Impression: Finding 1:  Irregular mass in the right breast is highly suggestive of malignancy. Biopsy is indicated. Finding 2:  Axillary lymph node in the right breast is suspicious. Biopsy is indicated. Finding 3:  Calcifications in the right breast  are suspicious. Biopsy is indicated. BI-RADS Category 5: Highly Suggestive for Malignancy COPIES TO: Electronically Signed By: Landy Katz M.D. Date signed: 10/11/2024 at 12:14:46    CT Chest Without Contrast Diagnostic  Result Date: 10/7/2024  Impression: Cardiac surgical changes with no acute cardiopulmonary abnormality. 1.2 cm right breast mass, as well as enlarged right axillary lymph node measuring 1.3 cm in short axis. No prior breast imaging available for comparison. If not recently performed, recommend diagnostic right breast mammogram. Right breast ultrasound can also be considered at time of exam for further evaluation. Electronically Signed: Abdirizak Salter MD  10/7/2024 4:11 PM EDT  Workstation ID: [id number]       PATHOLOGY     Tissue Pathology Exam  Facility: [facility]  Date: 10/29/2024  Final Diagnosis   Specimen #1 (Breast, right, core biopsy:):     Low-grade ductal carcinoma in situ with microcalcifications     Negative for invasive carcinoma  Specimen #2 (Breast, right, core biopsy):     Invasive ductal carcinoma (tubule score 3, nuclear score 1, mitotic score 1, total 5/9), see comment     Largest tumor focus 10 mm     Focal intermediate grade ductal carcinoma in situ with microcalcifications     No lymph-vascular invasion identified  Specimen #3 (Lymph node, right axilla, core biopsy):     Positive for metastatic ductal carcinoma     Electronically signed by Cedirck Hay MD on 11/6/2024 at 1627   Comment    Immunohistochemistry was performed utilizing appropriate controls and the tumor displays strong membranous positivity for E-cadherin and p120 catenin which confirms ductal origin.  HER2 testing is pending and will be resulted in an addendum.   Special Stains    Test: ER/DE, Paraffin Block, IHC  Final Diagnosis: Invasive ductal carcinoma  Site of biopsy/source: Right breast    Estrogen Receptor Assay (IHC): Positive    Progesterone Receptor Assay (IHC): Positive    HER2 (IHC): Equivocal,  2+    HERs (FISH): Negative, not amplified       [facility] Pathology Consultation  Date: 11/13/2024          LABS     HEMATOLOGY:  WBC   Date Value Ref Range Status   07/23/2024 5.36 3.40 - 10.80 10*3/mm3 Final     RBC   Date Value Ref Range Status   07/23/2024 4.19 3.77 - 5.28 10*6/mm3 Final     Hemoglobin   Date Value Ref Range Status   07/23/2024 12.6 12.0 - 15.9 g/dL Final     Hematocrit   Date Value Ref Range Status   07/23/2024 40.0 34.0 - 46.6 % Final     Platelets   Date Value Ref Range Status   07/23/2024 253 140 - 450 10*3/mm3 Final     CHEMISTRY:  Lab Results   Component Value Date    GLUCOSE 92 07/23/2024    BUN 22 07/23/2024    CREATININE 1.30 11/12/2024    BCR 19.5 07/23/2024    K 3.9 07/23/2024    CO2 25.0 07/23/2024    CALCIUM 9.4 07/23/2024    ALBUMIN 4.3 07/23/2024    AST 25 07/23/2024    ALT 18 07/23/2024       PROBLEM LIST     Patient Active Problem List   Diagnosis    CAD (coronary artery disease)    Non-STEMI (non-ST elevated myocardial infarction)    HTN (hypertension)    HLD (hyperlipidemia)    Type 2 diabetes mellitus, without long-term current use of insulin    S/P CABG x 3 with HUFFMAN per Dr. Hall 6/6/2024    Postoperative atrial fibrillation    Erythema of wound    Asymptomatic postmenopausal state    Breast cancer screening    Cervical dysplasia    Colon polyposis    Daytime somnolence    Elevated liver enzymes    Fibroadenosis of breast    Hematuria    Nocturnal hypoxemia    Osteoarthritis    Peripheral edema    Recurrent low back pain    Snoring    Stage 3 chronic kidney disease    Transaminitis    Malignant neoplasm of upper-outer quadrant of right breast in female, estrogen receptor positive        CURRENT MEDICATIONS     Current Outpatient Medications   Medication Instructions    Accu-Chek Softclix Lancets lancets 1 each, Other, Daily, Use as instructed Dx code: E11.9    aspirin 81 mg, Daily    Blood Glucose Monitoring Suppl (Accu-Chek Guide Me) w/Device kit 1 kit, Not  Applicable, Daily, Dx code: E11.9  NDC [address]-89  Bin#: [id number] Group #: [id number]  ID #: [id number]  Issuer #: [id number])    cholecalciferol (VITAMIN D3) 1,000 Units, Daily    coenzyme Q10 100 MG capsule 1 capsule, Daily    Flaxseed, Linseed, (Flax Seed Oil) 1000 MG capsule 1 capsule, Daily    furosemide (LASIX) 40 mg, Oral, 2 Times Daily    glucose blood (Accu-Chek Guide) test strip 1 each, Other, Daily, Use as instructed Dx code: E11.9    lovastatin (MEVACOR) 20 mg, Daily    metoprolol tartrate (LOPRESSOR) 25 mg, Oral, Every 12 Hours Scheduled    omega-3 acid ethyl esters (LOVAZA) 1 g    potassium chloride (KLOR-CON M20) 20 MEQ CR tablet 20 mEq, Oral, 2 Times Daily    Repatha 140 mg    vitamin E (VITAMIN E) 400 Units, Daily        ALLERGIES     Allergies   Allergen Reactions    Statins Other (See Comments)     Muscle aches           FAMILY HISTORY     Family History   Problem Relation Age of Onset    Diabetes Mother     Heart disease Mother     Diabetes Father     Heart disease Father     Stroke Father         SOCIAL HISTORY     Social History     Tobacco Use    Smoking status: Former     Current packs/day: 0.00     Average packs/day: 1.5 packs/day for 14.0 years (21.0 ttl pk-yrs)     Types: Cigarettes     Start date:      Quit date:      Years since quittin.9     Passive exposure: Past    Smokeless tobacco: Never   Vaping Use    Vaping status: Never Used   Substance Use Topics    Alcohol use: Not Currently    Drug use: Never        REVIEW OF SYSTEMS     Review of Systems   Constitutional:  Positive for activity change and fatigue.   Psychiatric/Behavioral:  The patient is nervous/anxious.       Vitals:    24 1023   BP: (!) 184/71   Pulse: 75   Resp: 24   Temp: 98.3 °F (36.8 °C)   SpO2: 96%      Physical Exam  Constitutional:       General: She is not in acute distress.  HENT:      Head: Normocephalic and atraumatic.   Pulmonary:      Effort: Pulmonary effort is normal. No respiratory distress.    Neurological:      Mental Status: She is alert and oriented to person, place, and time. Mental status is at baseline.   Psychiatric:         Mood and Affect: Mood normal.         Behavior: Behavior normal.      Breast exam deferred by patient until after surgery.     ECOG:  Restricted in physically strenuous activity but ambulatory and able to carry out work of a light or sedentary nature, e.g., light house work, office work = 1      ASSESSMENT AND PLAN     ASSESSMENT:      Jeannie Ruiz is a 74 y.o. female with a history of NSTEMI, multivessel coronary artery disease and coronary artery bypass with a left internal mammary artery graft on 6/7/2024 who presents with a Stage IB (cT1c, cN1(f), cM0, G1, ER+, MO+, HER2-) invasive ductal carcinoma of the right breast. She presents before surgery to discuss the role of radiation therapy in the management of her disease.     Diagnoses and all orders for this visit:    1. Malignant neoplasm of upper-outer quadrant of right breast in female, estrogen receptor positive (Primary)       PLAN:      ASSESSMENT     1)  Ms. Ruiz is a 74 y.o. female with AJCC 8th Edition Prognostic Stage IB (T1c N1 M0) RIGHT breast cancer.          2)  Hormone Receptor Status:  ER: positive MO positive, HER2/Leanna: negative          3)  Ms. Ruiz has not yet undergone surgery.          PLAN     1)  Should Ms. Ruiz choose to undergo breast conserving surgery, I recommend a course of adjuvant radiotherapy to the intact RIGHT breast and regional lymph nodes.  Standard of care radiotherapy would be conventional whole breast and regional zenaida radiotherapy delivered over 5-6 weeks, depending on the addition of a tumor bed boost. We also reviewed emerging data for hypofractionated whole breast and regional zenaida radiation and the pros and cons of each approach.          Should Ms. Ruiz choose to undergo mastectomy, then final pathology will be used to make adjuvant radiotherapy recommendations.  We  discussed she still has at least one indication for adjuvant radiotherapy after mastectomy given her lymph node positive disease.       2)  Lumpectomy bed boost recommendations will be made after review of her final pathology.          3)  I will plan on seeing the patient back 2 weeks after surgery to make final radiotherapy recommendations at that time. The patient is also planning on getting Oncotype testing to guide the recommendation of systemic therapy.     The patient is getting a second opinion at [facility]. If she decides to pursue radiation treatment at [facility] then she can return to our department on an as needed basis.              EDUCATION     Ready to learn, no apparent learning barriers were identified; learning preferences include listening. Explained diagnosis and treatment plan; patient expressed understanding of the content.          INFORMED CONSENT     The treatment alternatives, expected side effects and potential complications were discussed.  There is a small risk radiotherapy may cause permanent damage to any normal healthy tissue or organ adjacent to the treatment site which can include the following:          Breast resulting in poor cosmetic outcome     Ribs causing an increased risk of fracture     Lung causing a risk of pneumonitis     Heart causing a risk of heart attack, conduction or valvular abnormalities     Lymphatics resulting in lymphedema     Brachial plexus resulting in neurological dysfunction     DNA of normal cells resulting in a risk of 2nd malignancy          Acute side effects including dermatitis, skin itching, skin burning, peeling or blisters, breast swelling resulting in tenderness as well as fatigue.         I spent 60 minutes caring for Jeannie on this date of service. This time includes time spent by me in the following activities:preparing for the visit, reviewing tests, obtaining and/or reviewing a separately obtained history, performing a medically appropriate  examination and/or evaluation , counseling and educating the patient/family/caregiver, documenting information in the medical record, and independently interpreting results and communicating that information with the patient/family/caregiver    FOLLOW UP     No follow-ups on file.     CC: Seymour Aldridge MD Hopkins, Frank S, MD

## 2024-12-03 NOTE — PROGRESS NOTES
GENERAL SURGERY CONSULTATION NOTE    Consult requested by: Self    Patient Care Team:  Seymour Aldridge MD as PCP - General (Internal Medicine)  Vangie Alonso MD as Consulting Physician (Hematology and Oncology)    Reason for consult: Right-sided breast cancer    Subjective   From 11/5/2024  Patient is a 74 y.o. female presents with a new right-sided breast cancer.  The patient presented to [facility] from an outside hospital with suspicious calcifications concerning for possible DCIS at the 9 o'clock position extending posteriorly from a suspicious appearing mass in the right breast.  The patient subsequently underwent stereotactic biopsy of the area of calcifications and core needle biopsy of the mass and a suspicious appearing lymph node in the right axilla.  The biopsy results demonstrate:  Specimen #1 (Breast, right, core biopsy:):  Low-grade ductal carcinoma in situ with microcalcifications  Negative for invasive carcinoma    Specimen #2 (Breast, right, core biopsy):  Invasive ductal carcinoma (tubule score 3, nuclear score 1, mitotic score 1, total 5/9), see comment  Largest tumor focus 10 mm  Focal intermediate grade ductal carcinoma in situ with microcalcifications  No lymph-vascular invasion identified    Specimen #3 (Lymph node, right axilla, core biopsy):  Positive for metastatic ductal carcinoma    These were noted to be ER positive at 95%, and ME positive at 100%.  HER2 was equivocal on IHC and FISH remains pending  Patient reports that she beginning of her periods in her early teenage years, she went through menopause around age 49.  She has been pregnant 3 times with 3 live births and no miscarriages.  Her first child was born when she was 21 years old and she did breast-feed for approximately 2 years.  She did use an IUD for birth control, and had taken estrogen replacement therapy previously.  She has previously had breast surgery before.  Family history as noted below    From  "2024:  The patient returns to the office today after having her PET scan and MRI to review the results.  She would like to discuss in greater detail her surgical options and what to expect regarding surgery    Review of Systems   Genitourinary:  Negative for breast discharge, breast lump and breast pain.   Hematological:  Negative for adenopathy.        History  Past Medical History:   Diagnosis Date    Coronary artery disease     Diabetes mellitus     Elevated cholesterol     Hematuria     chronic microscopic    Hypertension      Past Surgical History:   Procedure Laterality Date    APPENDECTOMY      BREAST SURGERY      COLONOSCOPY      CORONARY ARTERY BYPASS GRAFT N/A 2024    Procedure: CORONARY ARTERY BYPASS WITH INTERNAL MAMMARY ARTERY GRAFT with intraop CHARLEE;  Surgeon: Bienvenido Hall MD;  Location: [facility];  Service: Cardiothoracic;  Laterality: N/A;  CABG x3,  2 vein grafts and 1 LIMA    SKIN BIOPSY      US GUIDED LYMPH NODE BIOPSY  10/29/2024     Family History   Problem Relation Age of Onset    Diabetes Mother     Heart disease Mother     Diabetes Father     Heart disease Father     Stroke Father      Social History     Tobacco Use    Smoking status: Former     Current packs/day: 0.00     Average packs/day: 1.5 packs/day for 14.0 years (21.0 ttl pk-yrs)     Types: Cigarettes     Start date:      Quit date:      Years since quittin.9     Passive exposure: Past    Smokeless tobacco: Never   Vaping Use    Vaping status: Never Used   Substance Use Topics    Alcohol use: Not Currently    Drug use: Never     (Not in a hospital admission)    Allergies:  Statins    Objective     Vital Signs  /97 (BP Location: Right arm, Patient Position: Sitting, Cuff Size: Large Adult)   Pulse 67   Temp 98.6 °F (37 °C) (Infrared)   Resp 18   Ht 162.6 cm (64\")   Wt 102 kg (225 lb 3.2 oz)   SpO2 96%   BMI 38.66 kg/m²      Physical Exam  Vitals reviewed. Exam conducted with a chaperone " present.   Constitutional:       Appearance: She is well-developed.   HENT:      Head: Normocephalic and atraumatic.   Eyes:      Pupils: Pupils are equal, round, and reactive to light.   Cardiovascular:      Rate and Rhythm: Normal rate and regular rhythm.   Pulmonary:      Effort: Pulmonary effort is normal.      Breath sounds: Normal breath sounds.   Chest:      Comments: Both breasts were examined in the upright position.  Both breasts exhibit pendulous morphology.  There are no overlying skin changes, skin thickening, dimpling, or rashes.  No discharge from the nipple is noted.  Within the right axilla there is a firm tender nodule consistent with the patient's biopsy-proven lymph node.  Abdominal:      General: There is no distension.      Palpations: Abdomen is soft.      Tenderness: There is no abdominal tenderness.      Hernia: No hernia is present.   Musculoskeletal:         General: Normal range of motion.      Cervical back: Normal range of motion.   Lymphadenopathy:      Cervical: No cervical adenopathy.      Upper Body:      Right upper body: Axillary adenopathy present. No supraclavicular adenopathy.      Left upper body: No supraclavicular or axillary adenopathy.   Skin:     General: Skin is warm and dry.      Findings: No rash.   Neurological:      Mental Status: She is alert and oriented to person, place, and time.         Results Review:   Lab Results (last 24 hours)       ** No results found for the last 24 hours. **          No radiology results for the last day      I reviewed the patient's new imaging results and agree with the interpretation.  I reviewed the patient's other test results and agree with the interpretation    Assessment & Plan     Right-sided breast cancer    We reviewed the patient's imaging by looking at the imaging results together, and her pathology reports in detail.  We discussed that breast cancer is treated in a multidisciplinary fashion, with input from radiation  oncology, medical oncology, and general surgery.  We discussed that the patient's case will be discussed at a multidisciplinary tumor board meeting held every other week.  We then discussed the diagnosis of breast cancer and that most breast cancers arise either from the ducts or from the lobules.  Using visual aids, we discussed the difference between invasive and in situ disease, especially whether or not the lymph nodes need to be evaluated.  Usually, with in situ disease, lymph nodes are not examined.  However, examining the lymph nodes should be considered if the area of concern is widespread or if it is high-grade.  We also discussed hormone receptors, and discussed that there are medications which can be given if the hormone receptors were positive which can be used to treat the cancer, and to provide protection in not only the breast with cancer, but the contralateral breast as well.  The surgical options were discussed with the patient which include breast conservation therapy (lumpectomy with radiation) and mastectomy.  With regards to mastectomy, we discussed that reconstruction may be performed either at the time of the surgery, or in a delayed fashion.  We discussed that the survival benefit between these 2 procedures are equivalent, and therefore they are considered oncologically appropriate.  However, some women will choose one versus the other for a multitude of factors.  There are times when a lumpectomy is not a feasible option, these include but are not limited to tumor to breast ratio, the location of the tumor, previous radiation to the chest wall, or connective tissue disorders which would make radiation treatment ineffective.  If the patient elects to undergo breast conservation therapy, she understands that this may include a lumpectomy with evaluation of her lymph nodes, followed by radiation.  There may or may not be a role for chemotherapy or hormonal therapy following her surgery as well.   This will be managed by medical oncology.  We discussed that in order for the surgery to be effective we will need to have clear surgical margins, and that if the margins are positive, then she may require further surgical excision versus a prolonged course of radiation.  The risk, benefits, and alternatives to surgery were discussed with the patient which include but are not limited to: bleeding, infection, damage to nerves/blood vessels, and pain.  If patient elects to undergo mastectomy, she understands that this may include an evaluation of her lymph nodes.  She understands that there may or may not be a role for radiation therapy following surgery.  There may or may not be a role for chemotherapy or hormonal therapy following her surgery as well.  This will be managed by medical oncology.  We discussed the risk, benefits, and alternatives to surgery which include but are not limited to: Bleeding, infection, damage to nerves/blood vessels, and pain.  Following surgery, the patient will have drains in place which will need to be managed at home.  These drains will be removed once the output is minimal and clear.  She is still unsure as to how she wishes to proceed and we will continue to discuss this with family.  She also is interested in meeting radiation oncology  She is concerned about whether or not she can have radiation therapy given her recent CABG.  I have reassured her that she is cleared both from Dr. Wright's perspective and Dr. Hall    She will call me to let me know how she wishes to proceed    The patient understands, and agrees to proceed with the further testing as above.      I discussed the patients findings and my recommendations with the patient and her daughter.     Lucio Brower MD  12/03/24  14:58 EST

## 2024-12-04 ENCOUNTER — CONSULT (OUTPATIENT)
Dept: RADIATION ONCOLOGY | Facility: HOSPITAL | Age: 74
End: 2024-12-04
Payer: MEDICARE

## 2024-12-04 ENCOUNTER — PATIENT OUTREACH (OUTPATIENT)
Dept: ONCOLOGY | Facility: CLINIC | Age: 74
End: 2024-12-04
Payer: MEDICARE

## 2024-12-04 VITALS
HEART RATE: 75 BPM | TEMPERATURE: 98.3 F | BODY MASS INDEX: 38.35 KG/M2 | SYSTOLIC BLOOD PRESSURE: 184 MMHG | OXYGEN SATURATION: 96 % | RESPIRATION RATE: 24 BRPM | DIASTOLIC BLOOD PRESSURE: 71 MMHG | WEIGHT: 223.4 LBS

## 2024-12-04 DIAGNOSIS — Z17.0 MALIGNANT NEOPLASM OF UPPER-OUTER QUADRANT OF RIGHT BREAST IN FEMALE, ESTROGEN RECEPTOR POSITIVE: Primary | ICD-10-CM

## 2024-12-04 DIAGNOSIS — C50.411 MALIGNANT NEOPLASM OF UPPER-OUTER QUADRANT OF RIGHT BREAST IN FEMALE, ESTROGEN RECEPTOR POSITIVE: Primary | ICD-10-CM

## 2024-12-04 PROCEDURE — G0463 HOSPITAL OUTPT CLINIC VISIT: HCPCS | Performed by: INTERNAL MEDICINE

## 2024-12-05 NOTE — PROGRESS NOTES
I accompanied the patient to her appointment on 12/4/2024 with Dr. Lovelace.    Dr. Lovelace discussed and reviewed the patient's images with her.     He discussed the role of radiation therapy after a lumpectomy and the with having a positive lymph node.    He also discussed the side effects of treatment.    The patient stated that she is scheduled with [facility] team tomorrow for a second opinion.    She stated that she would let us know what she decides to do for treatment.

## 2024-12-06 ENCOUNTER — TREATMENT (OUTPATIENT)
Dept: CARDIAC REHAB | Facility: HOSPITAL | Age: 74
End: 2024-12-06
Payer: MEDICARE

## 2024-12-06 DIAGNOSIS — Z95.1 S/P CABG (CORONARY ARTERY BYPASS GRAFT): Primary | ICD-10-CM

## 2024-12-06 PROCEDURE — 93798 PHYS/QHP OP CAR RHAB W/ECG: CPT

## 2024-12-10 ENCOUNTER — TELEPHONE (OUTPATIENT)
Dept: CARDIAC REHAB | Facility: HOSPITAL | Age: 74
End: 2024-12-10
Payer: MEDICARE

## 2025-01-14 NOTE — PROGRESS NOTES
[facility] RADIATION ONCOLOGY  RE-EVALUATION NOTE    NAME: Jeannie Ruiz  YOB: 1950  MRN #: [id number]  DATE OF SERVICE: 1/16/2025  REFERRING PROVIDER: Seymour Aldridge MD   PRIMARY CARE PROVIDER: Seymour Aldridge MD    CHIEF COMPLAINT:  Breast cancer    DIAGNOSIS:   Encounter Diagnosis   Name Primary?    Malignant neoplasm of upper-outer quadrant of right breast in female, estrogen receptor positive Yes         Cancer Staging   Stage IB (cT1c, cN1(f), cM0, G1, ER+, WV+, HER2-)  Stage IB (pT2, pN1a, cM0, G2, ER+, WV+, HER2-, Oncotype DX score: 25) right breast invasive ductal carcinoma      INTERVAL HISTORY     Jeannie Ruiz is a 74 y.o. female with a history of NSTEMI, multivessel coronary artery disease and coronary artery bypass with a left internal mammary artery graft on 6/7/2024 who presents with a Stage IB (pT2, pN1a, cM0, G2, ER+, WV+, HER2-, Oncotype DX score: 25) right breast invasive ductal carcinoma. She underwent lumpectomy and SLNB followed by axillary node dissection on 12/19/2024.  She presents for review of pathology and discussion of radiation treatment options.    Since our last appointment, the patient underwent surgical resection with Dr. Cheek [facility].  Pathology showed right breast invasive ductal carcinoma that measured 2.2 cm in largest diameter.  The tumor was unifocal and overall grade 2.  LVSI was not identified.  Skin was free of tumor.  DCIS was present with foci of both high-grade and low-grade DCIS.  DCIS was 15 mm in greatest linear extent with comedonecrosis and microcalcifications.  All margins were free of invasive carcinoma by over 5 mm and of DCIS by over 2 mm. 0/3 SLNB were positive for metastatic disease. An additional 2/7 lymph nodes were positive for metastatic disease from a right axilla excision.  A total of 2 of 10 lymph nodes were positive for metastatic disease measuring up to 10 mm in greatest diameter with 1 mm of extranodal  extension present.  Tumor was ER/WI positive, HER2 negative.  Final staging pT2N1a.     The patient reports slowly healing.  She has continued irritation and redness around her longer lumpectomy scar.  There are no current open wounds or drainage.  She does have some scabbing along some of her scars.  She reports her drain scar was very slow to heal.  It does appear completely closed at this point with a scab over the top.    She presents to discuss the role of radiation therapy in the management of her disease.  She has an appointment with medical oncology at [facility] tomorrow to discuss the role of systemic therapy in the management of her disease.        IMAGING     MRI Breast Bilateral Diagnostic W WO Contrast  Result Date: 11/12/2024  1.     Biopsy-proven 1.7 cm malignant mass in the 9:00 middle third of the right breast, with contiguous DCIS extending at least 3 cm posterior to this malignant mass as described above. Please note that some of the biopsy-proven DCIS is MR occult and extent of disease may be underestimated by MRI. This may preclude breast conservation. However if preoperative localization is indicated, please note that the residual calcifications should be targeted given that the biopsy clip migrated 2 cm medial to the known DCIS. 2.     No MR evidence of malignancy elsewhere in the right breast 3.     No MR evidence of malignancy within the left breast. 4.     Single biopsy-proven right axillary metastasis. 5.     Nondiagnostic evaluation of the sternum and internal mammary chain due to sternotomy wire artifact. No evidence of internal mammary metastasis is seen on recent chest CT or PET/CT.  BIRADS ASSESSMENT:Category 6: Known Biopsy-Proven Malignancy      Electronically Signed By-Carlos Claire On:11/12/2024 4:32 PM      NM PET/CT Skull Base to Mid Thigh  Result Date: 11/12/2024  Impression: 1.The biopsy-proven right breast malignancy is only mildly metabolically active. There is a also mildly  hypermetabolic right level 1 axillary lymph node with a biopsy clip compatible with the known axillary metastases. No other hypermetabolic adenopathy or evidence of metastatic disease in the neck, abdomen, pelvis or visualized skeletal structures.. 2.There is a 1.2 cm partially visualized density in the subcutaneous fat of the medial upper right thigh which is mildly metabolically active. This is likely related to previous greater saphenous vein harvest site for CABG. Correlation with clinical history is recommended. 3.Incidental CT findings as described above. Electronically Signed: Tru Blackwell DO  11/12/2024 10:06 AM EST  Workstation ID: [id number]    Mammo Stereotactic Breast Biopsy Initial With & Without Device  US Guided Breast Biopsy With & Without Device initial  US Guided Lymph Node Biopsy  Addendum Date: 11/7/2024    Pathology results from biopsies performed by Dr. Claire are available.  Specimen #1 (Breast, right, core biopsy:): Low-grade ductal carcinoma in situ with microcalcifications Negative for invasive carcinoma  Specimen #2 (Breast, right, core biopsy): Invasive ductal carcinoma (tubule score 3, nuclear score 1, mitotic score 1, total 5/9) Largest tumor focus 10 mm Focal intermediate grade ductal carcinoma in situ with microcalcifications No lymph-vascular invasion identified  Specimen #3 (Lymph node, right axilla, core biopsy): Positive for metastatic ductal carcinoma  These results are concordant with imaging findings. Recommend surgical/oncologic management.  Electronically Signed By-Allie Khan MD On:11/7/2024 4:31 PM    Result Date: 11/7/2024  Successful ultrasound guided core biopsy of the right breast and axilla. The patient tolerated the procedure well without immediate complication. Specimens have been sent to pathology for further analysis.   RECOMMENDATION: 1.     Addendum will be dictated upon receiving final pathology for concordance and recommendations. 2.     Please note that  there was medial migration of the biopsy clip at the site of calcifications sampled by stereotactic biopsy earlier today.   Electronically Signed ByOscar Claire On:10/29/2024 3:37 PM    Mammo Stereotactic Breast Specimen Right  Result Date: 10/29/2024  Technically successful stereotactic biopsy of the right breast, with migration of the biopsy clip as described above. This should be noted at the time of any planned preoperative localization.  RECOMMENDATIONS: 1.     After final pathology has been reviewed, an addendum will be dictated for concordance and further recommendations. 2.     Clip migration as above.  Electronically Signed ByOscar Claire On:10/29/2024 2:56 PM      US breast/axilla limited right  Result Date: 10/11/2024  Impression: Finding 1:  Irregular mass in the right breast is highly suggestive of malignancy. Biopsy is indicated. Finding 2:  Axillary lymph node in the right breast is suspicious. Biopsy is indicated. Finding 3:  Calcifications in the right breast are suspicious. Biopsy is indicated. BI-RADS Category 5: Highly Suggestive for Malignancy COPIES TO: Electronically Signed By: Landy Katz M.D. Date signed: 10/11/2024 at 12:14:46    Mammogram tomosynthesis diagnostic bilateral  Result Date: 10/11/2024  Impression: Finding 1:  Irregular mass in the right breast is highly suggestive of malignancy. Biopsy is indicated. Finding 2:  Axillary lymph node in the right breast is suspicious. Biopsy is indicated. Finding 3:  Calcifications in the right breast are suspicious. Biopsy is indicated. BI-RADS Category 5: Highly Suggestive for Malignancy COPIES TO: Electronically Signed By: Landy Katz M.D. Date signed: 10/11/2024 at 12:14:46    CT Chest Without Contrast Diagnostic  Result Date: 10/7/2024  Impression: Cardiac surgical changes with no acute cardiopulmonary abnormality. 1.2 cm right breast mass, as well as enlarged right axillary lymph node measuring 1.3 cm in short axis. No prior breast  imaging available for comparison. If not recently performed, recommend diagnostic right breast mammogram. Right breast ultrasound can also be considered at time of exam for further evaluation. Electronically Signed: Abdirizak Salter MD  10/7/2024 4:11 PM EDT  Workstation ID: [id number]       PATHOLOGY     Surgical Pathology: Right Lumpectomy with SLNB  [facility]  12/19/2024  Final Diagnosis:      A. Lymph nodes, right axilla, excision:           - Two of seven nodes positive for metastatic carcinoma (2/7).      B.  [place] lymph node #1, excision:           - Three nodes negative for metastasis (0/3).      C.  Breast, right, lumpectomy:           - Invasive ductal carcinoma.   Synopsis:      - Procedure: Lumpectomy and lymphadenectomy      -  Laterality: Right breast      -  Histologic type: Invasive ductal carcinoma      -  Tumor size: 22 mm      -  Tumor focality: Unifocal      -  Histologic grade: Overall grade 2      -  Lymphovascular invasion: Not identified      -  Dermal lymphatic invasion: Not identified      -  Skin: Free of tumor      -  Ductal carcinoma in situ: Present, with foci of both high-grade (grade 3) and      low-grade (grade 1) DCIS present, approximately 15 mm in greatest linear      extent, with comedonecrosis and microcalcification present      -  Treatment effect: Not applicable      -  Margins:         --DCIS: All margins free by over 2 mm         --Invasive carcinoma: All margins free by over 5 mm      -  Lymph nodes:         --Number of sentinel lymph nodes examined:     3         --Total number of lymph nodes examined:     10         --Number with macrometastases:    2         --Number with micrometastases: Â  0         --Number with isolated tumor cells:    0         --Size of largest deposit:    10 mm         --Extranodal extension:    1 mm      -  Biomarkers (tested previously):         --Estrogen receptor: Positive (95%)         --Progesterone receptor: Positive (80%)          --HER2: Equivocal (score 2+) by IHC;  negative (not amplified) by FISH      -  Stage (AJCC 8th ed):  pT2  pN1a       LABS     HEMATOLOGY:  WBC   Date Value Ref Range Status   07/23/2024 5.36 3.40 - 10.80 10*3/mm3 Final     RBC   Date Value Ref Range Status   07/23/2024 4.19 3.77 - 5.28 10*6/mm3 Final     Hemoglobin   Date Value Ref Range Status   07/23/2024 12.6 12.0 - 15.9 g/dL Final     Hematocrit   Date Value Ref Range Status   07/23/2024 40.0 34.0 - 46.6 % Final     Platelets   Date Value Ref Range Status   07/23/2024 253 140 - 450 10*3/mm3 Final     CHEMISTRY:  Lab Results   Component Value Date    GLUCOSE 92 07/23/2024    BUN 22 07/23/2024    CREATININE 1.30 11/12/2024    BCR 19.5 07/23/2024    K 3.9 07/23/2024    CO2 25.0 07/23/2024    CALCIUM 9.4 07/23/2024    ALBUMIN 4.3 07/23/2024    AST 25 07/23/2024    ALT 18 07/23/2024     PROBLEM LIST     Patient Active Problem List   Diagnosis    CAD (coronary artery disease)    Non-STEMI (non-ST elevated myocardial infarction)    HTN (hypertension)    HLD (hyperlipidemia)    Type 2 diabetes mellitus, without long-term current use of insulin    S/P CABG x 3 with HUFFMAN per Dr. Hall 6/6/2024    Postoperative atrial fibrillation    Erythema of wound    Asymptomatic postmenopausal state    Breast cancer screening    Cervical dysplasia    Colon polyposis    Daytime somnolence    Elevated liver enzymes    Fibroadenosis of breast    Hematuria    Nocturnal hypoxemia    Osteoarthritis    Peripheral edema    Recurrent low back pain    Snoring    Stage 3 chronic kidney disease    Transaminitis    Malignant neoplasm of upper-outer quadrant of right breast in female, estrogen receptor positive      CURRENT MEDICATIONS     Current Outpatient Medications   Medication Instructions    Accu-Chek Softclix Lancets lancets 1 each, Other, Daily, Use as instructed Dx code: E11.9    aspirin 81 mg, Daily    Blood Glucose Monitoring Suppl (Accu-Chek Guide Me) w/Device kit 1 kit, Not  Applicable, Daily, Dx code: E11.9  NDC [address]-49  Bin#: [id number] Group #: [id number]  ID #: [id number]  Issuer #: [id number])    cholecalciferol (VITAMIN D3) 1,000 Units, Daily    coenzyme Q10 100 MG capsule 1 capsule, Daily    Evolocumab (Repatha) solution prefilled syringe injection Inject 140 mg under the skin every 14 (fourteen) days.    Flaxseed, Linseed, (Flax Seed Oil) 1000 MG capsule 1 capsule, Daily    fluconazole (DIFLUCAN) 150 MG tablet Take 1 tablet (150 mg total) by mouth 1 (one) time for 1 dose.    furosemide (LASIX) 40 mg, Oral, 2 Times Daily    glucose blood (Accu-Chek Guide) test strip 1 each, Other, Daily, Use as instructed Dx code: E11.9    lovastatin (MEVACOR) 20 mg, Daily    metoprolol tartrate (LOPRESSOR) 25 mg, Oral, Every 12 Hours Scheduled    omega-3 acid ethyl esters (LOVAZA) 1 g    potassium chloride (KLOR-CON M20) 20 MEQ CR tablet 20 mEq, Oral, 2 Times Daily    Repatha 140 mg    sulfamethoxazole-trimethoprim (BACTRIM DS,SEPTRA DS) 800-160 MG per tablet Take 1 tablet by mouth 2 (two) times a day for 10 days.    vitamin E (VITAMIN E) 400 Units, Daily      ALLERGIES     Allergies   Allergen Reactions    Statins Other (See Comments)     Muscle aches         REVIEW OF SYSTEMS     Review of Systems   Constitutional:  Negative for activity change and fatigue.   Respiratory:  Negative for shortness of breath.    Skin:  Positive for color change and wound.        Vitals:    01/15/25 1530   BP: 162/83   Pulse: 70   Resp: 15   Temp: 97.8 °F (36.6 °C)   SpO2: 96%      Physical Exam  Constitutional:       General: She is not in acute distress.  HENT:      Head: Normocephalic and atraumatic.   Pulmonary:      Effort: Pulmonary effort is normal. No respiratory distress.   Chest:          Comments: Slowly healing sentinel lymph node biopsy and lumpectomy wounds.  Drain scar noted with scab over top.  No evidence of recent drainage or bleeding.  No evidence of infection.  Neurological:      Mental Status: She  is alert and oriented to person, place, and time. Mental status is at baseline.   Psychiatric:         Mood and Affect: Mood normal.         Behavior: Behavior normal.          ECOG:  Restricted in physically strenuous activity but ambulatory and able to carry out work of a light or sedentary nature, e.g., light house work, office work = 1    ASSESSMENT AND PLAN     ASSESSMENT:      Jeannie Ruiz is a 74 y.o. female with a history of NSTEMI, multivessel coronary artery disease and coronary artery bypass with a left internal mammary artery graft on 6/7/2024 who presents with a Stage IB (pT2, pN1a, cM0, G2, ER+, RI+, HER2-, Oncotype DX score: 25) right breast invasive ductal carcinoma. She underwent lumpectomy and SLNB followed by axillary node dissection on 12/19/2024.  She presents for review of pathology and discussion of radiation treatment options.    Diagnoses and all orders for this visit:    1. Malignant neoplasm of upper-outer quadrant of right breast in female, estrogen receptor positive (Primary)       PLAN:           ASSESSMENT     1)  Ms. Ruiz is a 74 y.o. female with AJCC 8th Edition Prognostic Stage IB (T2 N1 M0) RIGHT breast cancer.          2)  Hormone Receptor Status:  ER: positive RI positive, HER2/Leanna: negative          3)  Ms. Ruiz has undergone segmental mastectomy with sentinel lymph node biopsy.  Final pathology demonstrated right breast invasive ductal carcinoma that measured 2.2 cm in largest diameter.  The tumor was unifocal and overall grade 2.  LVSI was not identified.  Skin was free of tumor.  DCIS was present with foci of both high-grade and low-grade DCIS.  DCIS was 15 mm in greatest linear extent with comedonecrosis and microcalcifications.  All margins were free of invasive carcinoma by over 5 mm and of DCIS by over 2 mm. 0/3 SLNB were positive for metastatic disease. An additional 2/7 lymph nodes were positive for metastatic disease from a right axilla excision.  A total of 2 of  10 lymph nodes were positive for metastatic disease measuring up to 10 mm in greatest diameter with 1 mm of extranodal extension present.  Tumor was ER/NV positive, HER2 negative.  Final staging pT2N1a. Overall, she is healing well from the surgery.          4)  Ms. Ruiz meets with medical oncology on 1/16/2025 to discuss the role of systemic therapy in the management of her disease.       PLAN     1)  I recommend a course of adjuvant radiotherapy to the intact RIGHT breast and RNI.  Standard of care radiotherapy would be conventional whole breast radiotherapy delivered over 5-6 weeks, depending on the addition of a tumor bed boost.  We discussed the option of hypofractionated whole breast and regional lymph node radiation as well.  We discussed the pros and cons of each approach.  The patient will think about her options and decide prior to CT simulation which approach she prefers.         2)  I DO NOT recommend a lumpectomy bed boost as the patient is postmenopausal and had grade 2 disease.         3)  CT simulation will be scheduled at the patient's earliest convenience with the goal of starting radiotherapy 4-6 weeks following surgery.  Should she require adjuvant chemotherapy, I will see her back in clinic near the end of her chemotherapy with the goal of starting radiotherapy approximately 4-6 weeks after chemotherapy completion.          EDUCATION     Ready to learn, no apparent learning barriers were identified; learning preferences include listening. Explained diagnosis and treatment plan; patient expressed understanding of the content.          INFORMED CONSENT     The treatment alternatives, expected side effects and potential complications were discussed.  There is a small risk radiotherapy may cause permanent damage to any normal healthy tissue or organ adjacent to the treatment site which can include the following:          Breast resulting in poor cosmetic outcome     Ribs causing an increased risk  of fracture     Lung causing a risk of pneumonitis     Heart causing a risk of heart attack, conduction or valvular abnormalities     Lymphatics resulting in lymphedema     Brachial plexus resulting in neurological dysfunction     DNA of normal cells resulting in a risk of 2nd malignancy          Acute side effects including dermatitis, skin itching, skin burning, peeling or blisters, breast swelling resulting in tenderness as well as fatigue.          I spent 60 minutes caring for Jeannie on this date of service. This time includes time spent by me in the following activities:preparing for the visit, reviewing tests, obtaining and/or reviewing a separately obtained history, performing a medically appropriate examination and/or evaluation , counseling and educating the patient/family/caregiver, documenting information in the medical record, and independently interpreting results and communicating that information with the patient/family/caregiver    FOLLOW UP     No follow-ups on file.     CC: Seymour Aldridge MD Hopkins, Frank S, MD

## 2025-01-15 ENCOUNTER — OFFICE VISIT (OUTPATIENT)
Dept: RADIATION ONCOLOGY | Facility: HOSPITAL | Age: 75
End: 2025-01-15
Payer: MEDICARE

## 2025-01-15 VITALS
DIASTOLIC BLOOD PRESSURE: 83 MMHG | HEIGHT: 64 IN | OXYGEN SATURATION: 96 % | WEIGHT: 219.6 LBS | HEART RATE: 70 BPM | RESPIRATION RATE: 15 BRPM | TEMPERATURE: 97.8 F | SYSTOLIC BLOOD PRESSURE: 162 MMHG | BODY MASS INDEX: 37.49 KG/M2

## 2025-01-15 DIAGNOSIS — Z17.0 MALIGNANT NEOPLASM OF UPPER-OUTER QUADRANT OF RIGHT BREAST IN FEMALE, ESTROGEN RECEPTOR POSITIVE: Primary | ICD-10-CM

## 2025-01-15 DIAGNOSIS — C50.411 MALIGNANT NEOPLASM OF UPPER-OUTER QUADRANT OF RIGHT BREAST IN FEMALE, ESTROGEN RECEPTOR POSITIVE: Primary | ICD-10-CM

## 2025-01-15 PROCEDURE — G0463 HOSPITAL OUTPT CLINIC VISIT: HCPCS | Performed by: INTERNAL MEDICINE

## 2025-01-24 ENCOUNTER — TELEPHONE (OUTPATIENT)
Dept: ONCOLOGY | Facility: CLINIC | Age: 75
End: 2025-01-24

## 2025-01-24 NOTE — TELEPHONE ENCOUNTER
Caller: aisha johnson    Relationship to patient: Emergency Contact DAUGHTER    NO BH VERBAL RELEASE ON FILE     Best call back number: [phone]    Chief complaint: FOLLOW UP POST SURGERY FOR TREATMENT PLAN     Type of visit: FOLLOW UP     Requested date: ANYDAY BUT NEEDING LATEST IN THE AFTERNOON APPT TIME

## 2025-01-27 ENCOUNTER — TELEPHONE (OUTPATIENT)
Dept: ONCOLOGY | Facility: CLINIC | Age: 75
End: 2025-01-27
Payer: MEDICARE

## 2025-01-28 ENCOUNTER — TELEPHONE (OUTPATIENT)
Dept: CARDIOLOGY | Facility: CLINIC | Age: 75
End: 2025-01-28
Payer: MEDICARE

## 2025-01-28 ENCOUNTER — TELEPHONE (OUTPATIENT)
Dept: CARDIAC REHAB | Facility: HOSPITAL | Age: 75
End: 2025-01-28
Payer: MEDICARE

## 2025-01-28 ENCOUNTER — TELEPHONE (OUTPATIENT)
Dept: ONCOLOGY | Facility: CLINIC | Age: 75
End: 2025-01-28
Payer: MEDICARE

## 2025-01-28 ENCOUNTER — PATIENT OUTREACH (OUTPATIENT)
Dept: ONCOLOGY | Facility: CLINIC | Age: 75
End: 2025-01-28
Payer: MEDICARE

## 2025-01-28 RX ORDER — FUROSEMIDE 40 MG/1
40 TABLET ORAL 2 TIMES DAILY
Qty: 180 TABLET | Refills: 3 | Status: SHIPPED | OUTPATIENT
Start: 2025-01-28

## 2025-01-28 NOTE — TELEPHONE ENCOUNTER
Incoming Refill Request      Medication requested (name and dose): Furosemide 40 mg    Pharmacy where request should be sent: [facility]    Additional details provided by patient:     Best call back number: [phone]    Does the patient have less than a 3 day supply:  [] Yes   No    Jc Vernon Rep  01/28/25, 08:35 EST

## 2025-01-28 NOTE — TELEPHONE ENCOUNTER
Rx Refill Note  Requested Prescriptions     Signed Prescriptions Disp Refills    furosemide (Lasix) 40 MG tablet 180 tablet 3     Sig: Take 1 tablet by mouth 2 (Two) Times a Day.     Authorizing Provider: BEVERLY REINOSO     Ordering User: JOLENE BROWN      Last office visit with prescribing clinician: 11/27/2024   Last telemedicine visit with prescribing clinician: Visit date not found   Next office visit with prescribing clinician: 4/7/2025                         Would you like a call back once the refill request has been completed: [] Yes [] No    If the office needs to give you a call back, can they leave a voicemail: [] Yes [] No    Jolene Brown MA  01/28/25, 08:38 EST

## 2025-01-28 NOTE — PROGRESS NOTES
I had a voicemail from the patient requesting a return call to discuss bra resource.    I reviewed the patient's chart and returned her call.  I spoke with the patient and she stated that she was not give a bra to wear after surgery.  She stated that she was not able to wear a sports bra due how high it would come under her arm.   I gave the patient information on a  bra shop in [place], [place] ([employer]).    I also spoke with the patient letting her know that there were some calls in from her daughter to see Dr. Alonso.  She stated that her daughter, Rowena, is wanting her to have a second opinion with Dr. Alonso to see what she would suggest with the patient's Oncotype Results of 25.  The patient has seen Dr. Reynoso with [facility] and they discussed the need for oral chemotherapy and Zometa.      I let her know that I would notify Dr. Alonso's , Cece, and that she would give her a call.

## 2025-01-29 ENCOUNTER — TELEPHONE (OUTPATIENT)
Dept: ONCOLOGY | Facility: CLINIC | Age: 75
End: 2025-01-29
Payer: MEDICARE

## 2025-01-29 NOTE — TELEPHONE ENCOUNTER
Spoke with patient, she requested to change 02/06/25 appointment to next available late appointment with MD

## 2025-01-29 NOTE — TELEPHONE ENCOUNTER
"  Caller: Jeannie Ruiz \"LUZ\"    Relationship to patient: Self    Best call back number: [phone]    Type of visit: LAB AND FOLLOW UP    Requested date: LATE IN THE AFTERNOON     If rescheduling, when is the original appointment: 02/06     Additional notes: PLEASE CALL TO R/S.     "

## 2025-02-03 LAB
LAB AP CASE REPORT: NORMAL
LAB AP CLINICAL INFORMATION: NORMAL
LAB AP DIAGNOSIS COMMENT: NORMAL
LAB AP FISH HER2/NEU REPORT,ADDENDUM: NORMAL
LAB AP IHC HER2/NEU REPORT,ADDENDUM: NORMAL
LAB AP SPECIAL STAINS: NORMAL
Lab: NORMAL
PATH REPORT.ADDENDUM SPEC: NORMAL
PATH REPORT.FINAL DX SPEC: NORMAL
PATH REPORT.GROSS SPEC: NORMAL

## 2025-02-05 ENCOUNTER — HOSPITAL ENCOUNTER (OUTPATIENT)
Dept: RADIATION ONCOLOGY | Facility: HOSPITAL | Age: 75
Setting detail: RADIATION/ONCOLOGY SERIES
End: 2025-02-05
Payer: MEDICARE

## 2025-02-05 ENCOUNTER — OFFICE VISIT (OUTPATIENT)
Dept: RADIATION ONCOLOGY | Facility: HOSPITAL | Age: 75
End: 2025-02-05
Payer: MEDICARE

## 2025-02-05 DIAGNOSIS — Z17.0 MALIGNANT NEOPLASM OF UPPER-OUTER QUADRANT OF RIGHT BREAST IN FEMALE, ESTROGEN RECEPTOR POSITIVE: Primary | ICD-10-CM

## 2025-02-05 DIAGNOSIS — C50.411 MALIGNANT NEOPLASM OF UPPER-OUTER QUADRANT OF RIGHT BREAST IN FEMALE, ESTROGEN RECEPTOR POSITIVE: Primary | ICD-10-CM

## 2025-02-06 ENCOUNTER — HOSPITAL ENCOUNTER (OUTPATIENT)
Dept: PET IMAGING | Facility: HOSPITAL | Age: 75
Discharge: HOME | End: 2025-02-06
Admitting: INTERNAL MEDICINE
Payer: MEDICARE

## 2025-02-06 DIAGNOSIS — Z17.0 MALIGNANT NEOPLASM OF UPPER-OUTER QUADRANT OF RIGHT BREAST IN FEMALE, ESTROGEN RECEPTOR POSITIVE: ICD-10-CM

## 2025-02-06 DIAGNOSIS — C50.411 MALIGNANT NEOPLASM OF UPPER-OUTER QUADRANT OF RIGHT BREAST IN FEMALE, ESTROGEN RECEPTOR POSITIVE: ICD-10-CM

## 2025-02-06 LAB
CREAT BLDA-MCNC: 1.3 MG/DL (ref 0.6–1.3)
EGFRCR SERPLBLD CKD-EPI 2021: 43 ML/MIN/1.73

## 2025-02-06 PROCEDURE — 73701 CT LOWER EXTREMITY W/DYE: CPT

## 2025-02-06 PROCEDURE — 25510000001 IOPAMIDOL PER 1 ML: Performed by: INTERNAL MEDICINE

## 2025-02-06 PROCEDURE — 82565 ASSAY OF CREATININE: CPT

## 2025-02-06 RX ORDER — IOPAMIDOL 755 MG/ML
100 INJECTION, SOLUTION INTRAVASCULAR
Status: COMPLETED | OUTPATIENT
Start: 2025-02-06 | End: 2025-02-06

## 2025-02-06 RX ADMIN — IOPAMIDOL 100 ML: 755 INJECTION, SOLUTION INTRAVENOUS at 10:04

## 2025-02-06 NOTE — PROGRESS NOTES
[facility] RADIATION ONCOLOGY  RE-EVALUATION NOTE    NAME: Jeannie Ruiz  YOB: 1950  MRN #: [id number]  DATE OF SERVICE: 2/5/2025  REFERRING PROVIDER: Seymour Aldridge MD   PRIMARY CARE PROVIDER: Seymour Aldridge MD    CHIEF COMPLAINT:  Breast cancer    DIAGNOSIS:   Encounter Diagnosis   Name Primary?    Malignant neoplasm of upper-outer quadrant of right breast in female, estrogen receptor positive Yes         Cancer Staging   Stage IB (cT1c, cN1(f), cM0, G1, ER+, WY+, HER2-)  Stage IB (pT2, pN1a, cM0, G2, ER+, WY+, HER2-, Oncotype DX score: 25) right breast invasive ductal carcinoma      INTERVAL HISTORY     Jeannie Ruiz is a 74 y.o. female with a history of NSTEMI, multivessel coronary artery disease and coronary artery bypass with a left internal mammary artery graft on 6/7/2024 who presents with a Stage IB (pT2, pN1a, cM0, G2, ER+, WY+, HER2-, Oncotype DX score: 25) right breast invasive ductal carcinoma. She underwent lumpectomy and SLNB followed by axillary node dissection on 12/19/2024.  She presents for evaluation of skin healing.    The patient has improved significantly but is unsure if her scars are fully healed. She has not had recent drainage or signs of infection.         IMAGING     MRI Breast Bilateral Diagnostic W WO Contrast  Result Date: 11/12/2024  1.     Biopsy-proven 1.7 cm malignant mass in the 9:00 middle third of the right breast, with contiguous DCIS extending at least 3 cm posterior to this malignant mass as described above. Please note that some of the biopsy-proven DCIS is MR occult and extent of disease may be underestimated by MRI. This may preclude breast conservation. However if preoperative localization is indicated, please note that the residual calcifications should be targeted given that the biopsy clip migrated 2 cm medial to the known DCIS. 2.     No MR evidence of malignancy elsewhere in the right breast 3.     No MR evidence of  malignancy within the left breast. 4.     Single biopsy-proven right axillary metastasis. 5.     Nondiagnostic evaluation of the sternum and internal mammary chain due to sternotomy wire artifact. No evidence of internal mammary metastasis is seen on recent chest CT or PET/CT.  BIRADS ASSESSMENT:Category 6: Known Biopsy-Proven Malignancy      Electronically Signed By-Carlos Claire On:11/12/2024 4:32 PM      NM PET/CT Skull Base to Mid Thigh  Result Date: 11/12/2024  Impression: 1.The biopsy-proven right breast malignancy is only mildly metabolically active. There is a also mildly hypermetabolic right level 1 axillary lymph node with a biopsy clip compatible with the known axillary metastases. No other hypermetabolic adenopathy or evidence of metastatic disease in the neck, abdomen, pelvis or visualized skeletal structures.. 2.There is a 1.2 cm partially visualized density in the subcutaneous fat of the medial upper right thigh which is mildly metabolically active. This is likely related to previous greater saphenous vein harvest site for CABG. Correlation with clinical history is recommended. 3.Incidental CT findings as described above. Electronically Signed: Tru Blackwell DO  11/12/2024 10:06 AM EST  Workstation ID: [id number]    Mammo Stereotactic Breast Biopsy Initial With & Without Device  US Guided Breast Biopsy With & Without Device initial  US Guided Lymph Node Biopsy  Addendum Date: 11/7/2024    Pathology results from biopsies performed by Dr. Claire are available.  Specimen #1 (Breast, right, core biopsy:): Low-grade ductal carcinoma in situ with microcalcifications Negative for invasive carcinoma  Specimen #2 (Breast, right, core biopsy): Invasive ductal carcinoma (tubule score 3, nuclear score 1, mitotic score 1, total 5/9) Largest tumor focus 10 mm Focal intermediate grade ductal carcinoma in situ with microcalcifications No lymph-vascular invasion identified  Specimen #3 (Lymph node, right axilla,  core biopsy): Positive for metastatic ductal carcinoma  These results are concordant with imaging findings. Recommend surgical/oncologic management.  Electronically Signed By-Allie Khan MD On:11/7/2024 4:31 PM    Result Date: 11/7/2024  Successful ultrasound guided core biopsy of the right breast and axilla. The patient tolerated the procedure well without immediate complication. Specimens have been sent to pathology for further analysis.   RECOMMENDATION: 1.     Addendum will be dictated upon receiving final pathology for concordance and recommendations. 2.     Please note that there was medial migration of the biopsy clip at the site of calcifications sampled by stereotactic biopsy earlier today.   Electronically Signed By-Carlos Claire On:10/29/2024 3:37 PM    Mammo Stereotactic Breast Specimen Right  Result Date: 10/29/2024  Technically successful stereotactic biopsy of the right breast, with migration of the biopsy clip as described above. This should be noted at the time of any planned preoperative localization.  RECOMMENDATIONS: 1.     After final pathology has been reviewed, an addendum will be dictated for concordance and further recommendations. 2.     Clip migration as above.  Electronically Signed By-Carlos Claire On:10/29/2024 2:56 PM      US breast/axilla limited right  Result Date: 10/11/2024  Impression: Finding 1:  Irregular mass in the right breast is highly suggestive of malignancy. Biopsy is indicated. Finding 2:  Axillary lymph node in the right breast is suspicious. Biopsy is indicated. Finding 3:  Calcifications in the right breast are suspicious. Biopsy is indicated. BI-RADS Category 5: Highly Suggestive for Malignancy COPIES TO: Electronically Signed By: Landy Katz M.D. Date signed: 10/11/2024 at 12:14:46    Mammogram tomosynthesis diagnostic bilateral  Result Date: 10/11/2024  Impression: Finding 1:  Irregular mass in the right breast is highly suggestive of malignancy. Biopsy is  indicated. Finding 2:  Axillary lymph node in the right breast is suspicious. Biopsy is indicated. Finding 3:  Calcifications in the right breast are suspicious. Biopsy is indicated. BI-RADS Category 5: Highly Suggestive for Malignancy COPIES TO: Electronically Signed By: Landy Katz M.D. Date signed: 10/11/2024 at 12:14:46    CT Chest Without Contrast Diagnostic  Result Date: 10/7/2024  Impression: Cardiac surgical changes with no acute cardiopulmonary abnormality. 1.2 cm right breast mass, as well as enlarged right axillary lymph node measuring 1.3 cm in short axis. No prior breast imaging available for comparison. If not recently performed, recommend diagnostic right breast mammogram. Right breast ultrasound can also be considered at time of exam for further evaluation. Electronically Signed: Abdirizak Salter MD  10/7/2024 4:11 PM EDT  Workstation ID: [id number]       PATHOLOGY     Surgical Pathology: Right Lumpectomy with SLNB  [facility]  12/19/2024  Final Diagnosis:      A. Lymph nodes, right axilla, excision:           - Two of seven nodes positive for metastatic carcinoma (2/7).      B.  [place] lymph node #1, excision:           - Three nodes negative for metastasis (0/3).      C.  Breast, right, lumpectomy:           - Invasive ductal carcinoma.   Synopsis:      - Procedure: Lumpectomy and lymphadenectomy      -  Laterality: Right breast      -  Histologic type: Invasive ductal carcinoma      -  Tumor size: 22 mm      -  Tumor focality: Unifocal      -  Histologic grade: Overall grade 2      -  Lymphovascular invasion: Not identified      -  Dermal lymphatic invasion: Not identified      -  Skin: Free of tumor      -  Ductal carcinoma in situ: Present, with foci of both high-grade (grade 3) and      low-grade (grade 1) DCIS present, approximately 15 mm in greatest linear      extent, with comedonecrosis and microcalcification present      -  Treatment effect: Not applicable      -  Margins:          --DCIS: All margins free by over 2 mm         --Invasive carcinoma: All margins free by over 5 mm      -  Lymph nodes:         --Number of sentinel lymph nodes examined:     3         --Total number of lymph nodes examined:     10         --Number with macrometastases:    2         --Number with micrometastases: Â  0         --Number with isolated tumor cells:    0         --Size of largest deposit:    10 mm         --Extranodal extension:    1 mm      -  Biomarkers (tested previously):         --Estrogen receptor: Positive (95%)         --Progesterone receptor: Positive (80%)         --HER2: Equivocal (score 2+) by IHC;  negative (not amplified) by FISH      -  Stage (AJCC 8th ed):  pT2  pN1a       LABS     HEMATOLOGY:  WBC   Date Value Ref Range Status   07/23/2024 5.36 3.40 - 10.80 10*3/mm3 Final     RBC   Date Value Ref Range Status   07/23/2024 4.19 3.77 - 5.28 10*6/mm3 Final     Hemoglobin   Date Value Ref Range Status   07/23/2024 12.6 12.0 - 15.9 g/dL Final     Hematocrit   Date Value Ref Range Status   07/23/2024 40.0 34.0 - 46.6 % Final     Platelets   Date Value Ref Range Status   07/23/2024 253 140 - 450 10*3/mm3 Final     CHEMISTRY:  Lab Results   Component Value Date    GLUCOSE 92 07/23/2024    BUN 22 07/23/2024    CREATININE 1.30 11/12/2024    BCR 19.5 07/23/2024    K 3.9 07/23/2024    CO2 25.0 07/23/2024    CALCIUM 9.4 07/23/2024    ALBUMIN 4.3 07/23/2024    AST 25 07/23/2024    ALT 18 07/23/2024     PROBLEM LIST     Patient Active Problem List   Diagnosis    CAD (coronary artery disease)    Non-STEMI (non-ST elevated myocardial infarction)    HTN (hypertension)    HLD (hyperlipidemia)    Type 2 diabetes mellitus, without long-term current use of insulin    S/P CABG x 3 with ARMIDA per Dr. Hall 6/6/2024    Postoperative atrial fibrillation    Erythema of wound    Asymptomatic postmenopausal state    Breast cancer screening    Cervical dysplasia    Colon polyposis    Daytime somnolence    Elevated  liver enzymes    Fibroadenosis of breast    Hematuria    Nocturnal hypoxemia    Osteoarthritis    Peripheral edema    Recurrent low back pain    Snoring    Stage 3 chronic kidney disease    Transaminitis    Malignant neoplasm of upper-outer quadrant of right breast in female, estrogen receptor positive      CURRENT MEDICATIONS     Current Outpatient Medications   Medication Instructions    Accu-Chek Softclix Lancets lancets 1 each, Other, Daily, Use as instructed Dx code: E11.9    aspirin 81 mg, Daily    Blood Glucose Monitoring Suppl (Accu-Chek Guide Me) w/Device kit 1 kit, Not Applicable, Daily, Dx code: E11.9  NDC [address]-69  Bin#: [id number] Group #: [id number]  ID #: [id number]  Issuer #: [id number])    cholecalciferol (VITAMIN D3) 1,000 Units, Daily    coenzyme Q10 100 MG capsule 1 capsule, Daily    Evolocumab (Repatha) solution prefilled syringe injection Inject 140 mg under the skin every 14 (fourteen) days.    Flaxseed, Linseed, (Flax Seed Oil) 1000 MG capsule 1 capsule, Daily    fluconazole (DIFLUCAN) 150 MG tablet Take 1 tablet (150 mg total) by mouth 1 (one) time for 1 dose.    furosemide (LASIX) 40 mg, Oral, 2 Times Daily    glucose blood (Accu-Chek Guide) test strip 1 each, Other, Daily, Use as instructed Dx code: E11.9    lovastatin (MEVACOR) 20 mg, Daily    metoprolol tartrate (LOPRESSOR) 25 mg, Oral, Every 12 Hours Scheduled    omega-3 acid ethyl esters (LOVAZA) 1 g    potassium chloride (KLOR-CON M20) 20 MEQ CR tablet 20 mEq, Oral, 2 Times Daily    Repatha 140 mg    sulfamethoxazole-trimethoprim (BACTRIM DS,SEPTRA DS) 800-160 MG per tablet Take 1 tablet by mouth 2 (two) times a day for 10 days.    vitamin E (VITAMIN E) 400 Units, Daily      ALLERGIES     Allergies   Allergen Reactions    Statins Other (See Comments)     Muscle aches         REVIEW OF SYSTEMS     Review of Systems   Constitutional:  Negative for activity change and fatigue.   Respiratory:  Negative for shortness of breath.         There  were no vitals filed for this visit.     Physical Exam  Constitutional:       General: She is not in acute distress.  HENT:      Head: Normocephalic and atraumatic.   Pulmonary:      Effort: Pulmonary effort is normal. No respiratory distress.   Chest:          Comments: Significant improvement in SLNB scar and lumpectomy scar over right breast/chest wall. No evidence of recent bleeding, infection, or drainage.   Neurological:      Mental Status: She is alert and oriented to person, place, and time. Mental status is at baseline.   Psychiatric:         Mood and Affect: Mood normal.         Behavior: Behavior normal.          ECOG:  Restricted in physically strenuous activity but ambulatory and able to carry out work of a light or sedentary nature, e.g., light house work, office work = 1    ASSESSMENT AND PLAN     ASSESSMENT:      Jeannie Ruiz is a 74 y.o. female with a history of NSTEMI, multivessel coronary artery disease and coronary artery bypass with a left internal mammary artery graft on 6/7/2024 who presents with a Stage IB (pT2, pN1a, cM0, G2, ER+, MD+, HER2-, Oncotype DX score: 25) right breast invasive ductal carcinoma. She underwent lumpectomy and SLNB followed by axillary node dissection on 12/19/2024.  She presents for evaluation of skin healing.    Diagnoses and all orders for this visit:    1. Malignant neoplasm of upper-outer quadrant of right breast in female, estrogen receptor positive (Primary)  -     CT Lower Extremity Right With Contrast; Future       PLAN:           ASSESSMENT     1)  Ms. Ruiz is a 74 y.o. female with AJCC 8th Edition Prognostic Stage IB (T2 N1 M0) RIGHT breast cancer.          2)  Hormone Receptor Status:  ER: positive MD positive, HER2/Leanna: negative          3)  Ms. Ruiz has undergone segmental mastectomy with sentinel lymph node biopsy.  Final pathology demonstrated right breast invasive ductal carcinoma that measured 2.2 cm in largest diameter.  The tumor was unifocal  and overall grade 2.  LVSI was not identified.  Skin was free of tumor.  DCIS was present with foci of both high-grade and low-grade DCIS.  DCIS was 15 mm in greatest linear extent with comedonecrosis and microcalcifications.  All margins were free of invasive carcinoma by over 5 mm and of DCIS by over 2 mm. 0/3 SLNB were positive for metastatic disease. An additional 2/7 lymph nodes were positive for metastatic disease from a right axilla excision.  A total of 2 of 10 lymph nodes were positive for metastatic disease measuring up to 10 mm in greatest diameter with 1 mm of extranodal extension present.  Tumor was ER/IN positive, HER2 negative.  Final staging pT2N1a. Overall, she is healing well from the surgery.          4)  Ms. Ruiz met with medical oncology. She is planning adjuvant endocrine therapy, CDK4/6 inhibitor, and bisphosphonate therapy after completing adjuvant radiation therapy.        PLAN     1)  I recommend a course of adjuvant radiotherapy to the intact RIGHT breast and RNI.  Standard of care radiotherapy would be conventional whole breast radiotherapy delivered over 5-6 weeks, depending on the addition of a tumor bed boost.  We discussed the option of hypofractionated whole breast and regional lymph node radiation as well.  We discussed the pros and cons of each approach.  The patient will continue to think about her options and decide prior to CT simulation which approach she prefers.         2)  I DO NOT recommend a lumpectomy bed boost as the patient is postmenopausal and had grade 2 disease.         3)  CT simulation will be scheduled at the patient's earliest convenience with the goal of starting radiotherapy 4-6 weeks following surgery.  CT simulation was scheduled.          EDUCATION     Ready to learn, no apparent learning barriers were identified; learning preferences include listening. Explained diagnosis and treatment plan; patient expressed understanding of the content.           INFORMED CONSENT     The treatment alternatives, expected side effects and potential complications were discussed.  There is a small risk radiotherapy may cause permanent damage to any normal healthy tissue or organ adjacent to the treatment site which can include the following:          Breast resulting in poor cosmetic outcome     Ribs causing an increased risk of fracture     Lung causing a risk of pneumonitis     Heart causing a risk of heart attack, conduction or valvular abnormalities     Lymphatics resulting in lymphedema     Brachial plexus resulting in neurological dysfunction     DNA of normal cells resulting in a risk of 2nd malignancy          Acute side effects including dermatitis, skin itching, skin burning, peeling or blisters, breast swelling resulting in tenderness as well as fatigue.          I spent 60 minutes caring for Jeannie on this date of service. This time includes time spent by me in the following activities:preparing for the visit, reviewing tests, obtaining and/or reviewing a separately obtained history, performing a medically appropriate examination and/or evaluation , counseling and educating the patient/family/caregiver, documenting information in the medical record, and independently interpreting results and communicating that information with the patient/family/caregiver    FOLLOW UP     No follow-ups on file.     CC: Seymour Aldridge MD Hopkins, Frank S, MD

## 2025-02-11 ENCOUNTER — OFFICE VISIT (OUTPATIENT)
Dept: ONCOLOGY | Facility: CLINIC | Age: 75
End: 2025-02-11
Payer: MEDICARE

## 2025-02-11 ENCOUNTER — TELEPHONE (OUTPATIENT)
Dept: ONCOLOGY | Facility: CLINIC | Age: 75
End: 2025-02-11

## 2025-02-11 VITALS
HEART RATE: 71 BPM | DIASTOLIC BLOOD PRESSURE: 62 MMHG | HEIGHT: 64 IN | WEIGHT: 221 LBS | BODY MASS INDEX: 37.73 KG/M2 | OXYGEN SATURATION: 96 % | RESPIRATION RATE: 18 BRPM | TEMPERATURE: 97.5 F | SYSTOLIC BLOOD PRESSURE: 160 MMHG

## 2025-02-11 DIAGNOSIS — C50.811 MALIGNANT NEOPLASM OF OVERLAPPING SITES OF RIGHT FEMALE BREAST, UNSPECIFIED ESTROGEN RECEPTOR STATUS: Primary | ICD-10-CM

## 2025-02-11 DIAGNOSIS — Z79.811 AROMATASE INHIBITOR USE: ICD-10-CM

## 2025-02-11 RX ORDER — TRAMADOL HYDROCHLORIDE 50 MG/1
TABLET ORAL
COMMUNITY
Start: 2024-12-19

## 2025-02-11 NOTE — TELEPHONE ENCOUNTER
"  Hub staff attempted to follow warm transfer process and was unsuccessful     Caller: Jeannie Ruiz \"LUZ\"    Relationship to patient: Self    Best call back number: [phone]     Patient is needing: PT CALLING SHARRON BACK. PT CAN NOT COME IN EARLIER. PT HAS TO COME IN AT SCHEDULED TIME DUE TO TRANSPORTATION.         "

## 2025-02-11 NOTE — PATIENT INSTRUCTIONS
Arimidex - Anastrozole Tablets  What is this medication?  ANASTROZOLE (an AS troe zole) treats breast cancer. It works by decreasing the amount of estrogen hormone your body makes, which slows or stops breast cancer cells from spreading or growing.    This medicine may be used for other purposes; ask your health care provider or pharmacist if you have questions.    COMMON BRAND NAME(S): Arimidex    What should I tell my care team before I take this medication?  They need to know if you have any of these conditions:    Bone problems  Heart disease  High cholesterol  An unusual or allergic reaction to anastrozole, other medications, foods, dyes, or preservatives  Pregnant or trying to get pregnant  Breast-feeding  How should I use this medication?  Take this medication by mouth with a glass of water. Follow the directions on the prescription label. You can take it with or without food. If it upsets your stomach, take it with food. Take your medication at regular intervals. Do not take it more often than directed. Do not stop taking except on your care team's advice.    Talk to your care team about the use of this medication in children. Special care may be needed.    Overdosage: If you think you have taken too much of this medicine contact a poison control center or emergency room at once.    NOTE: This medicine is only for you. Do not share this medicine with others.    What if I miss a dose?  If you miss a dose, take it as soon as you can. If it is almost time for your next dose, take only that dose. Do not take double or extra doses.    What may interact with this medication?  Estrogen and progestin hormones  Tamoxifen  This list may not describe all possible interactions. Give your health care provider a list of all the medicines, herbs, non-prescription drugs, or dietary supplements you use. Also tell them if you smoke, drink alcohol, or use illegal drugs. Some items may interact with your medicine.    What  should I watch for while using this medication?  Visit your care team for regular checks on your progress. Let your care team know about any unusual vaginal bleeding.    Using this medication for a long time may weaken your bones. The risk of bone fractures may be increased. Talk to your care team about your bone health.    You should make sure that you get enough calcium and vitamin D while you are taking this medication. Discuss the foods you eat and the vitamins you take with your care team.    Talk to your care team if you may be pregnant. Serious birth defects can occur if you take this medication during pregnancy and for 3 weeks after the last dose. You will need a negative pregnancy test before starting this medication. Estrogen and progestin hormones may not work as well while you are taking this medication. Contraception is recommended while taking this medication and for 3 weeks after the last dose. Your care team can help you find the option that works for you.    Do not breastfeed while taking this medication and for 2 weeks after the last dose.    This medication may cause infertility. Talk with your care team if you are concerned about your fertility.    What side effects may I notice from receiving this medication?  Side effects that you should report to your care team as soon as possible:    Allergic reactions--skin rash, itching, hives, swelling of the face, lips, tongue, or throat  Side effects that usually do not require medical attention (report to your care team if they continue or are bothersome):    Bone, joint, or muscle pain  Headache  Hot flashes  Nausea  Sore throat  Swelling of the ankles, hands, or feet  Unusual weakness or fatigue  This list may not describe all possible side effects. Call your doctor for medical advice about side effects. You may report side effects to FDA at 4-510-FDA-8645.    Where should I keep my medication?  Keep out of the reach of children and pets.    Store at  room temperature between 20 and 25 degrees C (68 and 77 degrees F). Get rid of any unused medication after the expiration date.    To get rid of medications that are no longer wanted or have :    Take the medication to a medication take-back program. Check with your pharmacy or law enforcement to find a location.  If you cannot return the medication, check the label or package insert to see if the medication should be thrown out in the garbage or flushed down the toilet. If you are not sure, ask your care team. If it is safe to put it in the trash, empty the medication out of the container. Mix the medication with cat litter, dirt, coffee grounds, or other unwanted substance. Seal the mixture in a bag or container. Put it in the trash.  NOTE: This sheet is a summary. It may not cover all possible information. If you have questions about this medicine, talk to your doctor, pharmacist, or health care provider.    ©  [employer]/Gold Standard (2023 00:00:00)    Additional Information From [employer] About Arimidex    When To Contact Your Doctor or Health Care Provider:  Contact your health care provider immediately, day or night and go to the nearest emergency room, if you should experience any of the following symptoms:    New or worsening chest pain  Shortness of breath  Swelling of the face, lips, tongue and/or throat  Difficulty swallowing and/or breathing.  The following symptoms require medical attention, but are not emergency situations. Contact your health care provider within 24 hours of noticing any of the following:    Vaginal bleeding (similar to a period)  Tickling, Tingling, or Numbness of your skin  Nausea (interfering with ability to eat and unrelieved with prescribed medication)  Vomiting (vomiting more than 4-5 times in a 24 hour period)  Yellowing of your skin or the whites of your eyes  Pain on the right side of your stomach-area  Always inform your health care provider if you  "experience any unusual symptoms.      Kisqali - Ribociclib Tablets  What is this medication?  RIBOCICLIB (RYE carlos SYE klib) treats breast cancer. It works by blocking a protein that causes cancer cells to grow and multiply. This helps to slow or stop the spread of cancer cells.    This medicine may be used for other purposes; ask your health care provider or pharmacist if you have questions.    COMMON BRAND NAME(S): STACY    What should I tell my care team before I take this medication?  They need to know if you have any of these conditions:    Heart disease  High or low levels of calcium, magnesium, potassium, or phosphorus in the blood  Irregular heartbeat or rhythm  Liver disease  Low white blood cell levels  Lung or breathing disease, such as asthma or COPD  An unusual or allergic reaction to ribociclib, other medications, foods, dyes, or preservatives  If you or your partner are pregnant or trying to get pregnant  Breast-feeding  How should I use this medication?  Take this medication by mouth with water. Take it as directed on the prescription label at the same time every day. Do not cut, crush, or chew this medication. Swallow the tablets whole. You can take it with or without food. If it upsets your stomach, take it with food. Keep taking it unless your care team tells you to stop.    Do not take this medication with grapefruit juice.    This medication is taken in \"cycles.\" There will be days you do not take it. Talk to your care team if you have questions about when to take your medication. It is very important to follow the exact schedule. Taking it more often than directed can cause serious side effects.    Talk to your care team about the use of this medication in children. Special care may be needed.    Overdosage: If you think you have taken too much of this medicine contact a poison control center or emergency room at once.    NOTE: This medicine is only for you. Do not share this medicine with " others.    What if I miss a dose?  If you miss a dose, skip it. Take your next dose at the normal time. Do not take extra or 2 doses at the same time to make up for the missed dose.    What may interact with this medication?  Do not take this medication with any of the following:    Alfuzosin  Certain antivirals for HIV or hepatitis  Certain medications for anxiety, such as alprazolam or triazolam  Certain medications for cholesterol, such as lomitapide, lovastatin, simvastatin  Certain medications for fungal infections, such as fluconazole, isavuconazonium, ketoconazole, posaconazole  Certain medications for migraine headache, such as eletriptan or ubrogepant  Cisapride  Conivaptan  Dronedarone  Eplerenone  Ergot alkaloids, such as dihydroergotamine or ergotamine  Finerenone  Flibanserin  Ivabradine  Levoketoconazole  Lonafarnib  Lurasidone  Mavacamten  Naloxegol  Pacritinib  Pimozide  Ranolazine  Silodosin  Thioridazine  Tolvaptan  Voclosporin  This medication may also interact with the following:    Grapefruit juice  Other medications that cause heart rhythm changes  Tamoxifen  This medication may affect how other medications work, and other medications may affect the way this medication works. Talk with your care team about all the medications you take. They may suggest changes to your treatment plan to lower the risk of side effects and to make sure your medications work as intended.    This list may not describe all possible interactions. Give your health care provider a list of all the medicines, herbs, non-prescription drugs, or dietary supplements you use. Also tell them if you smoke, drink alcohol, or use illegal drugs. Some items may interact with your medicine.    What should I watch for while using this medication?  Your condition will be monitored carefully while you are receiving this medication. You may need blood work while taking this medication.    This medication may increase your risk of getting  an infection. Call your care team for advice if you get a fever, chills, sore throat, or other symptoms of a cold or flu. Do not treat yourself. Try to avoid being around people who are sick.    Avoid taking medications that contain aspirin, acetaminophen, ibuprofen, naproxen, or ketoprofen unless instructed by your care team. These medications may hide a fever.    This medication may increase your risk to bruise or bleed. Call your care team if you notice any unusual bleeding.    Be careful brushing or flossing your teeth or using a toothpick because you may get an infection or bleed more easily. If you have any dental work done, tell your dentist you are receiving this medication.    This medication may cause serious skin reactions. They can happen weeks to months after starting the medication. Contact your care team right away if you notice fevers or flu-like symptoms with a rash. The rash may be purple or red and then turn into blisters or peeling of the skin. You may also notice a red rash with swelling of the face, lips, or lymph nodes in your neck or under your arms.    Talk to your care team if you may be pregnant. Serious birth defects can occur if you take this medication during pregnancy and for 3 weeks after the last dose. You will need a negative pregnancy test before starting this medication. Contraception is recommended while taking this medication and for 3 weeks after the last dose. Your care team can help you find the option that works for you.    Do not breastfeed while taking this medication and for 3 weeks after the last dose.    This medication may cause infertility. Talk to your care team if you are concerned about your fertility.    What side effects may I notice from receiving this medication?  Side effects that you should report to your care team as soon as possible:    Allergic reactions--skin rash, itching, hives, swelling of the face, lips, tongue, or throat  Dry cough, shortness of  breath or trouble breathing  Heart rhythm changes--fast or irregular heartbeat, dizziness, feeling faint or lightheaded, chest pain, trouble breathing  Infection--fever, chills, cough, or sore throat  Liver injury--right upper belly pain, loss of appetite, nausea, light-colored stool, dark yellow or brown urine, yellowing skin or eyes, unusual weakness or fatigue  Rash, fever, and swollen lymph nodes  Redness, blistering, peeling, or loosening of the skin, including inside the mouth  Side effects that usually do not require medical attention (report these to your care team if they continue or are bothersome):    Diarrhea  Fatigue  Hair loss  Joint pain  Nausea  This list may not describe all possible side effects. Call your doctor for medical advice about side effects. You may report side effects to FDA at 4-087-FDA-6573.    Where should I keep my medication?  Keep out of the reach of children and pets.    Store at room temperature between 20 and 25 degrees C (68 and 77 degrees F). Keep this medication in the original packaging until you are ready to take it. Get rid of any unused medication after 2 months or after it expires, whichever is first.    To get rid of medications that are no longer needed or have :    Take the medication to a medication take-back program. Check with your pharmacy or law enforcement to find a location.  If you cannot return the medication, check the label or package insert to see if the medication should be thrown out in the garbage or flushed down the toilet. If you are not sure, ask your care team. If it is safe to put it in the trash, empty the medication out of the container. Mix the medication with cat litter, dirt, coffee grounds, or other unwanted substance. Seal the mixture in a bag or container. Put it in the trash.  NOTE: This sheet is a summary. It may not cover all possible information. If you have questions about this medicine, talk to your doctor, pharmacist, or  health care provider.    © 2024 [employer]/Gold Standard (2024-09-27 00:00:00)      Zometa - Zoledronic Acid Injection (Cancer)  What is this medication?  ZOLEDRONIC ACID (AMADA mahesh james ik AS id) treats high calcium levels in the blood caused by cancer. It may also be used with chemotherapy to treat weakened bones caused by cancer. It works by slowing down the release of calcium from bones. This lowers calcium levels in your blood. It also makes your bones stronger and less likely to break (fracture). It belongs to a group of medications called bisphosphonates.    This medicine may be used for other purposes; ask your health care provider or pharmacist if you have questions.    COMMON BRAND NAME(S): Zometa, Zometa Powder    What should I tell my care team before I take this medication?  They need to know if you have any of these conditions:    Dehydration  Dental disease  Kidney disease  Liver disease  Low levels of calcium in the blood  Lung or breathing disease, such as asthma  Receiving steroids, such as dexamethasone or prednisone  An unusual or allergic reaction to zoledronic acid, other medications, foods, dyes, or preservatives  Pregnant or trying to get pregnant  Breast-feeding  How should I use this medication?  This medication is injected into a vein. It is given by your care team in a hospital or clinic setting.    Talk to your care team about the use of this medication in children. Special care may be needed.    Overdosage: If you think you have taken too much of this medicine contact a poison control center or emergency room at once.    NOTE: This medicine is only for you. Do not share this medicine with others.    What if I miss a dose?  Keep appointments for follow-up doses. It is important not to miss your dose. Call your care team if you are unable to keep an appointment.    What may interact with this medication?  Certain antibiotics given by injection  Diuretics, such as bumetanide,  furosemide  NSAIDs, medications for pain and inflammation, such as ibuprofen or naproxen  Teriparatide  Thalidomide  This list may not describe all possible interactions. Give your health care provider a list of all the medicines, herbs, non-prescription drugs, or dietary supplements you use. Also tell them if you smoke, drink alcohol, or use illegal drugs. Some items may interact with your medicine.    What should I watch for while using this medication?  Visit your care team for regular checks on your progress. It may be some time before you see the benefit from this medication.    Some people who take this medication have severe bone, joint, or muscle pain. This medication may also increase your risk for jaw problems or a broken thigh bone. Tell your care team right away if you have severe pain in your jaw, bones, joints, or muscles. Tell you care team if you have any pain that does not go away or that gets worse.    Tell your dentist and dental surgeon that you are taking this medication. You should not have major dental surgery while on this medication. See your dentist to have a dental exam and fix any dental problems before starting this medication. Take good care of your teeth while on this medication. Make sure you see your dentist for regular follow-up appointments.    You should make sure you get enough calcium and vitamin D while you are taking this medication. Discuss the foods you eat and the vitamins you take with your care team.    Check with your care team if you have severe diarrhea, nausea, and vomiting, or if you sweat a lot. The loss of too much body fluid may make it dangerous for you to take this medication.    You may need bloodwork while taking this medication.    Talk to your care team if you wish to become pregnant or think you might be pregnant. This medication can cause serious birth defects.    What side effects may I notice from receiving this medication?  Side effects that you should  report to your care team as soon as possible:    Allergic reactions--skin rash, itching, hives, swelling of the face, lips, tongue, or throat  Kidney injury--decrease in the amount of urine, swelling of the ankles, hands, or feet  Low calcium level--muscle pain or cramps, confusion, tingling, or numbness in the hands or feet  Osteonecrosis of the jaw--pain, swelling, or redness in the mouth, numbness of the jaw, poor healing after dental work, unusual discharge from the mouth, visible bones in the mouth  Severe bone, joint, or muscle pain  Side effects that usually do not require medical attention (report to your care team if they continue or are bothersome):    Constipation  Fatigue  Fever  Loss of appetite  Nausea  Stomach pain  This list may not describe all possible side effects. Call your doctor for medical advice about side effects. You may report side effects to FDA at 6-972-FDA-2631.    Where should I keep my medication?  This medication is given in a hospital or clinic. It will not be stored at home.    NOTE: This sheet is a summary. It may not cover all possible information. If you have questions about this medicine, talk to your doctor, pharmacist, or health care provider.    © 2024 [employer] Standard (2023-02-10 00:00:00)    Additional Information From [employer] About Zometa  Self-Care Tips:  It is important that you stay well hydrated during treatment to protect your kidneys. Drink two to three quarts of fluid every 24 hours, unless you are instructed otherwise.  Acetaminophen may help relieve discomfort from fever, headache and/or generalized aches and pains. However, be sure to talk with your doctor before taking it.  This medication causes little nausea. But if you should experience nausea, take anti-nausea medications as prescribed by your doctor, and eat small frequent meals. Sucking on lozenges and chewing gum may also help.  You may experience drowsiness or dizziness; avoid driving or  engaging in tasks that require alertness until your response to the drug is known.  Get plenty of rest.  Maintain good nutrition.  If you experience symptoms or side effects, be sure to discuss them with your health care team. They can prescribe medications and/or offer other suggestions that are effective in managing such problems.    When to contact your doctor or health care provider:  Seek emergency help immediately and notify your health care provider, if you experience the following symptoms:    Shortness of breath, wheezing, difficulty breathing, closing up of the throat, swelling of facial features, hives (possible allergic reaction).  Contact your health care provider immediately, day or night, if you should experience the following:    Unusual muscle twitching or spasms (symptom of hypercalcemia)  Confusion (symptom of hypercalcemia)  Fever of 100.4° F (38° C), chills, sore throat (possible signs of infection)  Shortness of breath, chest pain or discomfort  The following symptoms require medical attention, but are not emergency situations. Contact your health care provider within 24 hours of noticing any of the following:    Vomiting (more than 4-5 episodes within a 24 hour period)  Nausea that interferes with eating and is not relieved by medications prescribed by your doctor.  Constipation unrelieved by laxative use  Loss of appetite  Fatigue and extreme tiredness (unable to perform self-care activities)  Feelings of confusion  Unable to eat or drink for 24 hours or have signs of dehydration: tiredness, thirst, dry mouth, dark and decrease amount of urine, or dizziness.

## 2025-02-11 NOTE — PROGRESS NOTES
Hematology/Oncology Outpatient Follow Up    PATIENT NAME:Jeannie Ruiz  :1950  MRN: [id number]  PRIMARY CARE PHYSICIAN: Seymour Aldridge MD  REFERRING PHYSICIAN: Seymour Aldridge MD    Chief Complaint   Patient presents with    Follow-up     Invasive ductal carcinoma of the right breast        HISTORY OF PRESENT ILLNESS:     This is a 74-year-old female was referred secondary to new diagnosis of invasive breast cancer.  She had routine bilateral mammogram on 10/11/2024 which basically revealed irregular mass with spiculated margins seen in the right breast at the 9 o'clock position associated with pleomorphic calcification.  For that reason ultrasound was recommended to further evaluate..  There were fine pleomorphic calcifications with segmental distribution seen in the lateral right breast which are suspicious this spans a distance of greater than 6 cm therefore biopsy of the suspicious calcifications as well as the spiculated mass was recommended  On the ultrasound there was an irregular mass that measures 1.2 x 9 mm in size at the 9 o'clock position corresponding to the irregular mass seen on mammogram suggestive of malignancy.  There is an axillary lymph node with abnormally thickened cortex measuring more than 3 mm in size.  Ultrasound-guided core biopsy of the right axillary lymph node was recommended as well as biopsy of the calcifications in the right breast and biopsy of the irregular mass in the right breast.     10/29/2024 patient had stereotactic biopsy of the calcifications and ultrasound-guided biopsy of the breast mass as well as the axillary lymph node     Pathology revealed the right core biopsy showed low-grade ductal carcinoma in situ with microcalcifications  The right breast mass biopsy showed invasive ductal carcinoma with no evidence of lymphovascular invasion ER positive at 95%, LA positive at 100% and HER2/marysol was 2+ on immunohistochemistry equivocal FISH is currently pending      The right axillary lymph node was positive for metastatic ductal carcinoma     She subsequently had a PET CT scan completed on 11/5/2024 which showed biopsy-proven right breast cancer mildly metabolic active and mildly hypermetabolic right axillary lymph node with biopsy clip.  No other hypermetabolic adenopathy was noted in the neck, abdomen pelvis or skeletal structure.        Patient had bilateral breast MRI which basically revealed biopsy-proven 1.7 cm mass at the 9 o'clock position on the right breast with contiguous DCIS extending at least 3 cm posteriorly otherwise no other additional abnormalities were seen in the right breast, left breast.        Family history of cancer  Patient is   She has 3 children  She does not smoke and does not drink alcohol  She retired and does for some farm work  now    Status post right lumpectomy with right axillary lymph node dissection.  She had residual invasive ductal carcinoma that measures 2.2 cm grade 2.  No evidence of lymphovascular invasion there was evidence of DCIS all margins negative for DCIS up to 2 mm and all margins negative for invasive cancer by over 5 mm.  3 out of 10 lymph nodes positive for metastatic disease size of the largest deposit was 10 mm with 1 mm extranodal extension.  ER +95%, UT +80% HER2/marysol 2+ on immunohistochemistry negative by FISH .pathology stage is ypT2 pN1a M0. Surgery was performed by Dr. Gabriela Roper at [facility]  Oncotype DX assay returned with a recurrence score of 25 which is consistent with risk of distant recurrence at 5 years estimated at 7% despite endocrine therapy and group average chemotherapy benefit of less than 1%.  On the validation as the ER was positive, UT was positive and HER2/marysol was negative  Postop course was complicated by postop infection  Past Medical History:   Diagnosis Date    Coronary artery disease     Diabetes mellitus     Elevated cholesterol     Hematuria     chronic  microscopic    Hypertension        Past Surgical History:   Procedure Laterality Date    APPENDECTOMY      BREAST SURGERY      COLONOSCOPY      CORONARY ARTERY BYPASS GRAFT N/A 6/6/2024    Procedure: CORONARY ARTERY BYPASS WITH INTERNAL MAMMARY ARTERY GRAFT with intraop CHARLEE;  Surgeon: Bienvenido Hall MD;  Location: [facility];  Service: Cardiothoracic;  Laterality: N/A;  CABG x3,  2 vein grafts and 1 LIMA    SKIN BIOPSY      US GUIDED LYMPH NODE BIOPSY  10/29/2024         Current Outpatient Medications:     traMADol (ULTRAM) 50 MG tablet, , Disp: , Rfl:     Accu-Chek Softclix Lancets lancets, 1 each by Other route Daily. Use as instructed Dx code: E11.9, Disp: 100 each, Rfl: 2    aspirin 81 MG EC tablet, Take 1 tablet by mouth Daily., Disp: , Rfl:     Blood Glucose Monitoring Suppl (Accu-Chek Guide Me) w/Device kit, Use 1 kit Daily. Dx code: E11.9  NDC [address]-54  Bin#: [id number] Group #: [id number]  ID #: [id number]  Issuer #: [id number]), Disp: 1 kit, Rfl: 0    cholecalciferol (Vitamin D, Cholecalciferol,) 25 MCG (1000 UT) tablet, Take 1 tablet by mouth Daily., Disp: , Rfl:     coenzyme Q10 100 MG capsule, Take 1 capsule by mouth Daily., Disp: , Rfl:     Evolocumab (Repatha) solution prefilled syringe injection, Inject 1 mL under the skin into the appropriate area as directed., Disp: , Rfl:     Evolocumab (Repatha) solution prefilled syringe injection, Inject 140 mg under the skin every 14 (fourteen) days., Disp: 6 mL, Rfl: 3    Flaxseed, Linseed, (Flax Seed Oil) 1000 MG capsule, Take 1 capsule by mouth Daily., Disp: , Rfl:     fluconazole (DIFLUCAN) 150 MG tablet, Take 1 tablet (150 mg total) by mouth 1 (one) time for 1 dose., Disp: 1 tablet, Rfl: 0    furosemide (Lasix) 40 MG tablet, Take 1 tablet by mouth 2 (Two) Times a Day., Disp: 180 tablet, Rfl: 3    glucose blood (Accu-Chek Guide) test strip, 1 each by Other route Daily. Use as instructed Dx code: E11.9, Disp: 100 each, Rfl: 2    lovastatin (MEVACOR) 20 MG  tablet, Take 1 tablet by mouth Daily., Disp: , Rfl:     metoprolol tartrate (LOPRESSOR) 25 MG tablet, Take 1 tablet by mouth Every 12 (Twelve) Hours., Disp: 180 tablet, Rfl: 3    omega-3 acid ethyl esters (LOVAZA) 1 g capsule, Take 1 capsule by mouth., Disp: , Rfl:     potassium chloride (KLOR-CON M20) 20 MEQ CR tablet, Take 1 tablet by mouth 2 (Two) Times a Day., Disp: 180 tablet, Rfl: 3    sulfamethoxazole-trimethoprim (BACTRIM DS,SEPTRA DS) 800-160 MG per tablet, Take 1 tablet by mouth 2 (two) times a day for 10 days., Disp: 20 tablet, Rfl: 0    VITAMIN E 400 UNIT capsule, Take 1 capsule by mouth Daily., Disp: , Rfl:     Allergies   Allergen Reactions    Statins Other (See Comments)     Muscle aches         Family History   Problem Relation Age of Onset    Diabetes Mother     Heart disease Mother     Diabetes Father     Heart disease Father     Stroke Father        Cancer-related family history is not on file.    Social History     Tobacco Use    Smoking status: Former     Current packs/day: 0.00     Average packs/day: 1.5 packs/day for 14.0 years (21.0 ttl pk-yrs)     Types: Cigarettes     Start date:      Quit date:      Years since quittin.1     Passive exposure: Past    Smokeless tobacco: Never   Vaping Use    Vaping status: Never Used   Substance Use Topics    Alcohol use: Not Currently    Drug use: Never       I have reviewed and confirmed the accuracy of the patient's history: Chief complaint, HPI, ROS, and Subjective as entered by the MA/LPN/RN. Vangiebernadette Alonso MD 25      SUBJECTIVE:     Patient is here today for follow-up.    REVIEW OF SYSTEMS:  Review of Systems   Constitutional:  Negative for chills, fatigue and fever.   HENT:  Negative for congestion, drooling, ear discharge, rhinorrhea, sinus pressure and tinnitus.    Eyes:  Negative for photophobia, pain and discharge.   Respiratory:  Negative for apnea, choking and stridor.    Cardiovascular:  Negative for palpitations.  "  Gastrointestinal:  Negative for abdominal distention, abdominal pain and anal bleeding.   Endocrine: Negative for polydipsia and polyphagia.   Genitourinary:  Negative for decreased urine volume, flank pain and genital sores.   Musculoskeletal:  Negative for gait problem, neck pain and neck stiffness.   Skin:  Negative for color change, rash and wound.   Neurological:  Negative for tremors, seizures, syncope, facial asymmetry and speech difficulty.   Hematological:  Negative for adenopathy.   Psychiatric/Behavioral:  Negative for agitation, confusion, hallucinations and self-injury. The patient is not hyperactive.        OBJECTIVE:    Vitals:    02/11/25 1521   BP: 160/62   Pulse: 71   Resp: 18   Temp: 97.5 °F (36.4 °C)   TempSrc: Infrared   SpO2: 96%   Weight: 100 kg (221 lb)   Height: 162.6 cm (64\")   PainSc: 0-No pain     Body mass index is 37.93 kg/m².    ECOG  (1) Restricted in physically strenuous activity, ambulatory and able to do work of light nature    Physical Exam  Vitals and nursing note reviewed.   Constitutional:       General: She is not in acute distress.     Appearance: She is not diaphoretic.   HENT:      Head: Normocephalic and atraumatic.   Eyes:      General: No scleral icterus.        Right eye: No discharge.         Left eye: No discharge.      Conjunctiva/sclera: Conjunctivae normal.   Neck:      Thyroid: No thyromegaly.   Cardiovascular:      Rate and Rhythm: Normal rate and regular rhythm.      Heart sounds: Normal heart sounds.      No friction rub. No gallop.   Pulmonary:      Effort: Pulmonary effort is normal. No respiratory distress.      Breath sounds: No stridor. No wheezing.   Abdominal:      General: Bowel sounds are normal.      Palpations: Abdomen is soft. There is no mass.      Tenderness: There is no abdominal tenderness. There is no guarding or rebound.   Musculoskeletal:         General: No tenderness. Normal range of motion.      Cervical back: Normal range of motion and " neck supple.   Lymphadenopathy:      Cervical: No cervical adenopathy.   Skin:     General: Skin is warm.      Findings: No erythema or rash.   Neurological:      Mental Status: She is alert and oriented to person, place, and time.      Motor: No abnormal muscle tone.   Psychiatric:         Behavior: Behavior normal.         RECENT LABS  WBC   Date Value Ref Range Status   07/23/2024 5.36 3.40 - 10.80 10*3/mm3 Final     RBC   Date Value Ref Range Status   07/23/2024 4.19 3.77 - 5.28 10*6/mm3 Final     Hemoglobin   Date Value Ref Range Status   07/23/2024 12.6 12.0 - 15.9 g/dL Final     Hematocrit   Date Value Ref Range Status   07/23/2024 40.0 34.0 - 46.6 % Final     MCV   Date Value Ref Range Status   07/23/2024 95.5 79.0 - 97.0 fL Final     MCH   Date Value Ref Range Status   07/23/2024 30.1 26.6 - 33.0 pg Final     MCHC   Date Value Ref Range Status   07/23/2024 31.5 31.5 - 35.7 g/dL Final     RDW   Date Value Ref Range Status   07/23/2024 13.6 12.3 - 15.4 % Final     RDW-SD   Date Value Ref Range Status   07/23/2024 48.1 37.0 - 54.0 fl Final     MPV   Date Value Ref Range Status   07/23/2024 9.5 6.0 - 12.0 fL Final     Platelets   Date Value Ref Range Status   07/23/2024 253 140 - 450 10*3/mm3 Final     Neutrophil %   Date Value Ref Range Status   07/23/2024 59.1 42.7 - 76.0 % Final     Lymphocyte %   Date Value Ref Range Status   07/23/2024 26.9 19.6 - 45.3 % Final     Monocyte %   Date Value Ref Range Status   07/23/2024 10.8 5.0 - 12.0 % Final     Eosinophil %   Date Value Ref Range Status   07/23/2024 2.4 0.3 - 6.2 % Final     Basophil %   Date Value Ref Range Status   07/23/2024 0.6 0.0 - 1.5 % Final     Immature Grans %   Date Value Ref Range Status   07/23/2024 0.2 0.0 - 0.5 % Final     Neutrophils, Absolute   Date Value Ref Range Status   07/23/2024 3.17 1.70 - 7.00 10*3/mm3 Final     Lymphocytes, Absolute   Date Value Ref Range Status   07/23/2024 1.44 0.70 - 3.10 10*3/mm3 Final     Monocytes, Absolute    Date Value Ref Range Status   07/23/2024 0.58 0.10 - 0.90 10*3/mm3 Final     Eosinophils, Absolute   Date Value Ref Range Status   07/23/2024 0.13 0.00 - 0.40 10*3/mm3 Final     Basophils, Absolute   Date Value Ref Range Status   07/23/2024 0.03 0.00 - 0.20 10*3/mm3 Final     Immature Grans, Absolute   Date Value Ref Range Status   07/23/2024 0.01 0.00 - 0.05 10*3/mm3 Final     nRBC   Date Value Ref Range Status   07/23/2024 0.0 0.0 - 0.2 /100 WBC Final       Lab Results   Component Value Date    GLUCOSE 92 07/23/2024    BUN 22 07/23/2024    CREATININE 1.30 02/06/2025    BCR 19.5 07/23/2024    K 3.9 07/23/2024    CO2 25.0 07/23/2024    CALCIUM 9.4 07/23/2024    ALBUMIN 4.3 07/23/2024    AST 25 07/23/2024    ALT 18 07/23/2024         Assessment & Plan     Malignant neoplasm of overlapping sites of right female breast, unspecified estrogen receptor status  - CBC & Differential      ASSESSMENT:    Malignant neoplasm of overlapping sites of right female breast, unspecified estrogen receptor status  - CBC & Differential         Clinical Invasive ductal carcinoma of the right breast, ER positive NJ positive HER2/marysol is 2+ equivocal FISH is negative.  Clinical T1 cN1 M0.  Biopsy-proven right axillary lymph node positivity  Status  post right lumpectomy with right axillary lymph node dissection.  2 out of 10 lymph nodes positive for metastatic disease.  ypT2 ypN1a M0  Oncotype DX assay returned with a recurrence score of 25 which is consistent with risk of distant recurrence at 5 years estimated at 7% despite endocrine therapy and group average chemotherapy benefit of less than 1%.  On the validation as the ER was positive, NJ was positive and HER2/marysol was negative  No family history of cancer  Postmenopausal age  Recent CABG three-vessel disease  June 2024: Her cardiologist is Dr. Wright and cardiothoracic surgeon is Dr. Hall  She will a candidate for endocrine therapy                Plans     Reviewed her pathology  results  Discussed that she would be a candidate for adjuvant XRT.  She is already established with Dr. Lovelace with plans to begin radiation next week  Discussed the results of her Oncotype DX assay, chemotherapy benefit less than 1%  Discussed that she would be a candidate for endocrine therapy and CDK 4 inhibitor given that she has stage II disease, hormone receptor positive.  I have recommended Arimidex and Kisqali  Discussed that also there is benefit of biphosphonate with Zometa approximately 2 to 3% survivorship benefit, bone bone health.  With consideration to treat for 3 to 5 years  She was given information today on Arimidex, Kisqali and Zometa to review  Will plan to see her again in 6 weeks post radiation and plan on her systemic treatment  Reviewed her imaging studies progress note pathology reports.  Her case was also reviewed at the breast conference   All questions answered  Discussed with patient and daughter  All questions answered         I spent 40 total minutes, face-to-face, caring for Jeannie today. 90% of this time involved counseling and/or coordination of care as documented within this note.       Electronically signed by Vangie Alonso MD, 02/11/25, 10:06 PM EST.

## 2025-02-12 ENCOUNTER — TELEPHONE (OUTPATIENT)
Dept: ONCOLOGY | Facility: CLINIC | Age: 75
End: 2025-02-12
Payer: MEDICARE

## 2025-02-12 NOTE — TELEPHONE ENCOUNTER
Caller: aisha johnson ( NO BH VERBAL)    Relationship: Emergency Contact    Best call back number: [phone]    What is the best time to reach you: ANYTIME    Who are you requesting to speak with (clinical staff, provider,  specific staff member): CLINICAL    What was the call regarding: CALLING TO CHECK ON THE REFERRAL STATUS FOR PHYSICAL THERAPY   REQUESTING C/B ONCE THE REFERRAL HAS BEEN SENT

## 2025-02-18 DIAGNOSIS — C50.811 MALIGNANT NEOPLASM OF OVERLAPPING SITES OF RIGHT FEMALE BREAST, UNSPECIFIED ESTROGEN RECEPTOR STATUS: Primary | ICD-10-CM

## 2025-02-19 ENCOUNTER — HOSPITAL ENCOUNTER (OUTPATIENT)
Dept: RADIATION ONCOLOGY | Facility: HOSPITAL | Age: 75
Discharge: HOME | End: 2025-02-19

## 2025-02-19 PROCEDURE — 77332 RADIATION TREATMENT AID(S): CPT | Performed by: INTERNAL MEDICINE

## 2025-02-19 PROCEDURE — 77290 THER RAD SIMULAJ FIELD CPLX: CPT | Performed by: INTERNAL MEDICINE

## 2025-02-19 PROCEDURE — 77334 RADIATION TREATMENT AID(S): CPT | Performed by: INTERNAL MEDICINE

## 2025-02-28 PROCEDURE — 77338 DESIGN MLC DEVICE FOR IMRT: CPT | Performed by: INTERNAL MEDICINE

## 2025-02-28 PROCEDURE — 77301 RADIOTHERAPY DOSE PLAN IMRT: CPT | Performed by: INTERNAL MEDICINE

## 2025-02-28 PROCEDURE — 77300 RADIATION THERAPY DOSE PLAN: CPT | Performed by: INTERNAL MEDICINE

## 2025-03-03 ENCOUNTER — DOCUMENTATION (OUTPATIENT)
Dept: RADIATION ONCOLOGY | Facility: HOSPITAL | Age: 75
End: 2025-03-03

## 2025-03-03 ENCOUNTER — EDUCATION (OUTPATIENT)
Dept: RADIATION ONCOLOGY | Facility: HOSPITAL | Age: 75
End: 2025-03-03
Payer: MEDICARE

## 2025-03-03 ENCOUNTER — RADIATION ONCOLOGY WEEKLY ASSESSMENT (OUTPATIENT)
Dept: RADIATION ONCOLOGY | Facility: HOSPITAL | Age: 75
End: 2025-03-03
Payer: MEDICARE

## 2025-03-03 ENCOUNTER — HOSPITAL ENCOUNTER (OUTPATIENT)
Dept: RADIATION ONCOLOGY | Facility: HOSPITAL | Age: 75
Setting detail: RADIATION/ONCOLOGY SERIES
End: 2025-03-03
Payer: MEDICARE

## 2025-03-03 VITALS
DIASTOLIC BLOOD PRESSURE: 94 MMHG | WEIGHT: 220 LBS | SYSTOLIC BLOOD PRESSURE: 171 MMHG | HEART RATE: 78 BPM | RESPIRATION RATE: 18 BRPM | OXYGEN SATURATION: 93 % | BODY MASS INDEX: 37.76 KG/M2

## 2025-03-03 DIAGNOSIS — Z17.0 MALIGNANT NEOPLASM OF UPPER-OUTER QUADRANT OF RIGHT BREAST IN FEMALE, ESTROGEN RECEPTOR POSITIVE: Primary | ICD-10-CM

## 2025-03-03 DIAGNOSIS — C50.411 MALIGNANT NEOPLASM OF UPPER-OUTER QUADRANT OF RIGHT BREAST IN FEMALE, ESTROGEN RECEPTOR POSITIVE: Primary | ICD-10-CM

## 2025-03-03 LAB
RAD ONC ARIA COURSE ID: NORMAL
RAD ONC ARIA COURSE LAST TREATMENT DATE: NORMAL
RAD ONC ARIA COURSE START DATE: NORMAL
RAD ONC ARIA COURSE TREATMENT ELAPSED DAYS: 0
RAD ONC ARIA FIRST TREATMENT DATE: NORMAL
RAD ONC ARIA PLAN FRACTIONS TREATED TO DATE: 1
RAD ONC ARIA PLAN ID: NORMAL
RAD ONC ARIA PLAN PRESCRIBED DOSE PER FRACTION: 2 GY
RAD ONC ARIA PLAN PRIMARY REFERENCE POINT: NORMAL
RAD ONC ARIA PLAN TOTAL FRACTIONS PRESCRIBED: 25
RAD ONC ARIA PLAN TOTAL PRESCRIBED DOSE: 5000 CGY
RAD ONC ARIA REFERENCE POINT DOSAGE GIVEN TO DATE: 2 GY
RAD ONC ARIA REFERENCE POINT ID: NORMAL
RAD ONC ARIA REFERENCE POINT SESSION DOSAGE GIVEN: 2 GY

## 2025-03-03 PROCEDURE — 77014 CHG CT GUIDANCE RADIATION THERAPY FLDS PLACEMENT: CPT | Performed by: INTERNAL MEDICINE

## 2025-03-03 PROCEDURE — 77385: CPT | Performed by: INTERNAL MEDICINE

## 2025-03-03 PROCEDURE — FACE2FACE: Performed by: INTERNAL MEDICINE

## 2025-03-03 PROCEDURE — 77427 RADIATION TX MANAGEMENT X5: CPT | Performed by: INTERNAL MEDICINE

## 2025-03-03 NOTE — PROGRESS NOTES
[facility] RADIATION ONCOLOGY  ON-TREATMENT VISIT    NAME: Jeannie Ruiz  YOB: 1950  MRN #: [id number]  DATE OF SERVICE: 3/3/2025  PRESCRIBING PHYSICIAN: Robert Lovelace MD    DIAGNOSIS:      ICD-10-CM ICD-9-CM   1. Malignant neoplasm of upper-outer quadrant of right breast in female, estrogen receptor positive  C50.411 174.4    Z17.0 V86.0      RADIATION THERAPY VISIT:  Continue radiation therapy, Dosimetry plan remains acceptable, Films reviewed and remains acceptable, Pain assessed, Pain management planned, Radiation dose schedule reviewed and remains acceptable, Radiation technique remains acceptable, and Symptoms within expected range    Radiation Treatments       Active   Plans   DIBHRScAxCWal   Most recent treatment: Dose planned: 200 cGy (fraction 1 on 3/3/2025)   Total: Dose planned: 5,000 cGy (25 fractions)   Elapsed Days: 0      Reference Points   RtBreast   Most recent treatment: Dose given: 200 cGy (on 3/3/2025)   Total: Dose given: 200 cGy   Elapsed Days: 0                    [] Concurrent Chemo   Regimen:       PHYSICAL ASSESSMENT         Vitals:    03/03/25 1115   BP: 171/94   Pulse: 78   Resp: 18   SpO2: 93%      Wt Readings from Last 3 Encounters:   03/03/25 99.8 kg (220 lb)   02/11/25 100 kg (221 lb)   01/15/25 99.6 kg (219 lb 9.6 oz)     Restricted in physically strenuous activity but ambulatory and able to carry out work of a light or sedentary nature, e.g., light house work, office work = 1    We examined the relevant areas: yes  Findings are within the expected range for this stage of treatment: yes    ACTION ITEMS     Patient tolerating treatment well and as expected for this stage in their treatment and Continue radiation therapy as planned    Estimated Completion Date: 4/4/2025    Anticipatory guidance provided.    Reviewed appropriate skin care.       Robert Lovelace MD  Radiation Oncology  [facility]

## 2025-03-03 NOTE — PROGRESS NOTES
RADIATION THERAPY SKIN CARE INSTRUCTIONS  Breast Cancer    First Radiation Treatment: 03/03/2025    Jeannie TIJERINA Chokio was provided verbal and written education regarding proper skin care during radiation therapy treatments that included:    DURING TREATMENTS  Shower before each treatment using a mild unscented soap and warm water.   Wash affected area and gently remove the skin product and deodorant during the shower. Do NOT scrub. Clean your skin using your fingertips. Dry treatment area with a clean, soft towel and gently pat dry. Apply Eucerin or Aquaphor to moisturize the skin in the treated area following a shower.  Apply a thin layer of topical steroid cream to your treated skin twice a day.   From the day of your first treatment until two weeks after treatment, apply a thin layer of steroid cream to your radiation treatment area after your daily treatment and once in the evening. Apply Eucerin or Aquaphor after applying the steroid cream. Only use steroid cream on intact skin.  Wear loose clothing made of cotton or other soft fabric over your treated skin.   Avoid underwire bras during the remainder of your treatment.  (Optional) Use a green tea spray to your treatment area twice daily to help reduce inflammation.  To make, steep two caffeinated green tea bags in 8 ounces boiling water. Let stand one hour. Pour into spray bottle. Spray on your treatment area and allow to dry before applying the steroid cream and moisturizer. Make a fresh batch every couple days to ensure potency.  Do not:  Use cosmetic products, such a perfume, make-up, or aftershave, on your treated skin.  Use cornstarch or baby powder under your breast or underarm.  Apply adhesive tape on your treatment area.  Place hot packs, cold packs, or heating pads on your treatment area.  Shave the armpit near your treatment area with a straight razor.  Use any products on your treated skin that you have not discussed with your treatment team.  Rub  or scratch the skin in your treatment area.  Expose your treated skin to direct sunlight or cold environments.  Take baths, use hot tubs, or swim in lakes or pools if your skin is not intact.    AFTER TREATMENTS  Continue applying your unscented lotion daily until your one-month follow-up appointment with your radiation doctor.  Do not use any products on the treated area of skin that has not been discussed with the doctor.    She was provided a skin care bag that included an 8 oz spray bottle, 12 caffeinated green tea bags, 1 oz Eucerin sample, 1 oz Hydrocortisone Cream 1%, and a bar of Dove Sensitive Skin soap.    She had opportunity to ask questions and engage in the skin care discussion. All questions were answered to satisfaction and she verbalized understanding of instructions. She was encouraged to reach out at any time during and after radiation therapy treatments with any questions or concerns regarding skin care, skin reactions, or breast concerns.    Evette Acuña RN, BSN  Radiation Oncology Department  [facility]  [phone]  Office

## 2025-03-03 NOTE — PROGRESS NOTES
At the time of consultation and at the time of CT simulation, the patient was offered both conventional and hypofractionated radiation therapy to the breast and regional lymph nodes. She chose conventional at both her consult and CT simulation appointment. She went and got a 2nd opinion at [facility] and decided she would like to do hypofractionated radiation therapy. I was not made aware of this request until after her first treatment was delivered. I offered to adjust her plan to transition to a hypofractionated course. The patient initially declined but then discussed with her daughter. They called back and requested a change to her treatment plan to finish in a total of 16 treatments. The treatment plan was adjusted to deliver the shorter course of treatment.

## 2025-03-04 LAB
RAD ONC ARIA COURSE ID: NORMAL
RAD ONC ARIA COURSE LAST TREATMENT DATE: NORMAL
RAD ONC ARIA COURSE START DATE: NORMAL
RAD ONC ARIA COURSE TREATMENT ELAPSED DAYS: 1
RAD ONC ARIA FIRST TREATMENT DATE: NORMAL
RAD ONC ARIA PLAN FRACTIONS TREATED TO DATE: 1
RAD ONC ARIA PLAN ID: NORMAL
RAD ONC ARIA PLAN PRESCRIBED DOSE PER FRACTION: 2.7 GY
RAD ONC ARIA PLAN PRIMARY REFERENCE POINT: NORMAL
RAD ONC ARIA PLAN TOTAL FRACTIONS PRESCRIBED: 15
RAD ONC ARIA PLAN TOTAL PRESCRIBED DOSE: 4050 CGY
RAD ONC ARIA REFERENCE POINT DOSAGE GIVEN TO DATE: 4.7 GY
RAD ONC ARIA REFERENCE POINT ID: NORMAL
RAD ONC ARIA REFERENCE POINT SESSION DOSAGE GIVEN: 2.7 GY

## 2025-03-04 PROCEDURE — 77014 CHG CT GUIDANCE RADIATION THERAPY FLDS PLACEMENT: CPT | Performed by: INTERNAL MEDICINE

## 2025-03-04 PROCEDURE — 77385: CPT | Performed by: INTERNAL MEDICINE

## 2025-03-05 LAB
RAD ONC ARIA COURSE ID: NORMAL
RAD ONC ARIA COURSE LAST TREATMENT DATE: NORMAL
RAD ONC ARIA COURSE START DATE: NORMAL
RAD ONC ARIA COURSE TREATMENT ELAPSED DAYS: 2
RAD ONC ARIA FIRST TREATMENT DATE: NORMAL
RAD ONC ARIA PLAN FRACTIONS TREATED TO DATE: 2
RAD ONC ARIA PLAN ID: NORMAL
RAD ONC ARIA PLAN PRESCRIBED DOSE PER FRACTION: 2.7 GY
RAD ONC ARIA PLAN PRIMARY REFERENCE POINT: NORMAL
RAD ONC ARIA PLAN TOTAL FRACTIONS PRESCRIBED: 15
RAD ONC ARIA PLAN TOTAL PRESCRIBED DOSE: 4050 CGY
RAD ONC ARIA REFERENCE POINT DOSAGE GIVEN TO DATE: 7.4 GY
RAD ONC ARIA REFERENCE POINT ID: NORMAL
RAD ONC ARIA REFERENCE POINT SESSION DOSAGE GIVEN: 2.7 GY

## 2025-03-05 PROCEDURE — 77385: CPT | Performed by: INTERNAL MEDICINE

## 2025-03-05 PROCEDURE — 77014 CHG CT GUIDANCE RADIATION THERAPY FLDS PLACEMENT: CPT | Performed by: INTERNAL MEDICINE

## 2025-03-06 ENCOUNTER — HOSPITAL ENCOUNTER (OUTPATIENT)
Dept: RADIATION ONCOLOGY | Facility: HOSPITAL | Age: 75
Discharge: HOME | End: 2025-03-06

## 2025-03-06 LAB
RAD ONC ARIA COURSE ID: NORMAL
RAD ONC ARIA COURSE LAST TREATMENT DATE: NORMAL
RAD ONC ARIA COURSE START DATE: NORMAL
RAD ONC ARIA COURSE TREATMENT ELAPSED DAYS: 3
RAD ONC ARIA FIRST TREATMENT DATE: NORMAL
RAD ONC ARIA PLAN FRACTIONS TREATED TO DATE: 3
RAD ONC ARIA PLAN ID: NORMAL
RAD ONC ARIA PLAN PRESCRIBED DOSE PER FRACTION: 2.7 GY
RAD ONC ARIA PLAN PRIMARY REFERENCE POINT: NORMAL
RAD ONC ARIA PLAN TOTAL FRACTIONS PRESCRIBED: 15
RAD ONC ARIA PLAN TOTAL PRESCRIBED DOSE: 4050 CGY
RAD ONC ARIA REFERENCE POINT DOSAGE GIVEN TO DATE: 10.1 GY
RAD ONC ARIA REFERENCE POINT ID: NORMAL
RAD ONC ARIA REFERENCE POINT SESSION DOSAGE GIVEN: 2.7 GY

## 2025-03-06 PROCEDURE — 77385: CPT | Performed by: INTERNAL MEDICINE

## 2025-03-06 PROCEDURE — 77336 RADIATION PHYSICS CONSULT: CPT | Performed by: INTERNAL MEDICINE

## 2025-03-06 PROCEDURE — 77014 CHG CT GUIDANCE RADIATION THERAPY FLDS PLACEMENT: CPT | Performed by: INTERNAL MEDICINE

## 2025-03-07 ENCOUNTER — HOSPITAL ENCOUNTER (OUTPATIENT)
Dept: RADIATION ONCOLOGY | Facility: HOSPITAL | Age: 75
Discharge: HOME | End: 2025-03-07

## 2025-03-07 LAB
RAD ONC ARIA COURSE ID: NORMAL
RAD ONC ARIA COURSE LAST TREATMENT DATE: NORMAL
RAD ONC ARIA COURSE START DATE: NORMAL
RAD ONC ARIA COURSE TREATMENT ELAPSED DAYS: 4
RAD ONC ARIA FIRST TREATMENT DATE: NORMAL
RAD ONC ARIA PLAN FRACTIONS TREATED TO DATE: 4
RAD ONC ARIA PLAN ID: NORMAL
RAD ONC ARIA PLAN PRESCRIBED DOSE PER FRACTION: 2.7 GY
RAD ONC ARIA PLAN PRIMARY REFERENCE POINT: NORMAL
RAD ONC ARIA PLAN TOTAL FRACTIONS PRESCRIBED: 15
RAD ONC ARIA PLAN TOTAL PRESCRIBED DOSE: 4050 CGY
RAD ONC ARIA REFERENCE POINT DOSAGE GIVEN TO DATE: 12.8 GY
RAD ONC ARIA REFERENCE POINT ID: NORMAL
RAD ONC ARIA REFERENCE POINT SESSION DOSAGE GIVEN: 2.7 GY

## 2025-03-07 PROCEDURE — 77014 CHG CT GUIDANCE RADIATION THERAPY FLDS PLACEMENT: CPT | Performed by: INTERNAL MEDICINE

## 2025-03-07 PROCEDURE — 77385: CPT | Performed by: INTERNAL MEDICINE

## 2025-03-10 ENCOUNTER — RADIATION ONCOLOGY WEEKLY ASSESSMENT (OUTPATIENT)
Dept: RADIATION ONCOLOGY | Facility: HOSPITAL | Age: 75
End: 2025-03-10
Payer: MEDICARE

## 2025-03-10 ENCOUNTER — HOSPITAL ENCOUNTER (OUTPATIENT)
Dept: RADIATION ONCOLOGY | Facility: HOSPITAL | Age: 75
Discharge: HOME | End: 2025-03-10
Payer: MEDICARE

## 2025-03-10 VITALS
DIASTOLIC BLOOD PRESSURE: 85 MMHG | RESPIRATION RATE: 18 BRPM | SYSTOLIC BLOOD PRESSURE: 157 MMHG | BODY MASS INDEX: 37.76 KG/M2 | OXYGEN SATURATION: 95 % | HEART RATE: 84 BPM | WEIGHT: 220 LBS

## 2025-03-10 DIAGNOSIS — Z17.0 MALIGNANT NEOPLASM OF UPPER-OUTER QUADRANT OF RIGHT BREAST IN FEMALE, ESTROGEN RECEPTOR POSITIVE: Primary | ICD-10-CM

## 2025-03-10 DIAGNOSIS — C50.411 MALIGNANT NEOPLASM OF UPPER-OUTER QUADRANT OF RIGHT BREAST IN FEMALE, ESTROGEN RECEPTOR POSITIVE: Primary | ICD-10-CM

## 2025-03-10 LAB
RAD ONC ARIA COURSE ID: NORMAL
RAD ONC ARIA COURSE LAST TREATMENT DATE: NORMAL
RAD ONC ARIA COURSE START DATE: NORMAL
RAD ONC ARIA COURSE TREATMENT ELAPSED DAYS: 7
RAD ONC ARIA FIRST TREATMENT DATE: NORMAL
RAD ONC ARIA PLAN FRACTIONS TREATED TO DATE: 1
RAD ONC ARIA PLAN ID: NORMAL
RAD ONC ARIA PLAN PRESCRIBED DOSE PER FRACTION: 2.7 GY
RAD ONC ARIA PLAN PRIMARY REFERENCE POINT: NORMAL
RAD ONC ARIA PLAN TOTAL FRACTIONS PRESCRIBED: 11
RAD ONC ARIA PLAN TOTAL PRESCRIBED DOSE: 2970 CGY
RAD ONC ARIA REFERENCE POINT DOSAGE GIVEN TO DATE: 15.5 GY
RAD ONC ARIA REFERENCE POINT ID: NORMAL
RAD ONC ARIA REFERENCE POINT SESSION DOSAGE GIVEN: 2.7 GY

## 2025-03-10 PROCEDURE — 77014 CHG CT GUIDANCE RADIATION THERAPY FLDS PLACEMENT: CPT | Performed by: INTERNAL MEDICINE

## 2025-03-10 PROCEDURE — 77385: CPT | Performed by: INTERNAL MEDICINE

## 2025-03-10 NOTE — PROGRESS NOTES
[facility] RADIATION ONCOLOGY  ON-TREATMENT VISIT    NAME: Jeannie Ruiz  YOB: 1950  MRN #: [id number]  DATE OF SERVICE: 3/10/2025  PRESCRIBING PHYSICIAN: Robert Lovelace MD    DIAGNOSIS:      ICD-10-CM ICD-9-CM   1. Malignant neoplasm of upper-outer quadrant of right breast in female, estrogen receptor positive  C50.411 174.4    Z17.0 V86.0      RADIATION THERAPY VISIT:  Continue radiation therapy, Dosimetry plan remains acceptable, Films reviewed and remains acceptable, Pain assessed, Pain management planned, Radiation dose schedule reviewed and remains acceptable, Radiation technique remains acceptable, and Symptoms within expected range    Radiation Treatments       Active   Plans   DIBHRScAxCWal   Most recent treatment: Dose planned: 200 cGy (fraction 1 on 3/3/2025)   Total: Dose planned: 5,000 cGy (25 fractions)   Elapsed Days: 0      DIBHRScAxCWal:1   Most recent treatment: Dose planned: 270 cGy (fraction 4 on 3/7/2025)   Total: Dose planned: 4,050 cGy (15 fractions)   Elapsed Days: 4      Reference Points   RtBreast   Most recent treatment: Dose given: 270 cGy (on 3/7/2025)   Total: Dose given: 1,280 cGy   Elapsed Days: 4                    [] Concurrent Chemo   Regimen:       PHYSICAL ASSESSMENT         There were no vitals filed for this visit.     Wt Readings from Last 3 Encounters:   03/03/25 99.8 kg (220 lb)   02/11/25 100 kg (221 lb)   01/15/25 99.6 kg (219 lb 9.6 oz)     Restricted in physically strenuous activity but ambulatory and able to carry out work of a light or sedentary nature, e.g., light house work, office work = 1    We examined the relevant areas: yes  Findings are within the expected range for this stage of treatment: yes    ACTION ITEMS     Patient tolerating treatment well and as expected for this stage in their treatment and Continue radiation therapy as planned    Estimated Completion Date: 4/4/2025    Anticipatory guidance provided.    Reviewed  appropriate skin care.     Patient tolerating treatment well.       Robert Lovelace MD  Radiation Oncology  [facility]

## 2025-03-11 ENCOUNTER — HOSPITAL ENCOUNTER (OUTPATIENT)
Dept: RADIATION ONCOLOGY | Facility: HOSPITAL | Age: 75
Discharge: HOME | End: 2025-03-11

## 2025-03-11 LAB
RAD ONC ARIA COURSE ID: NORMAL
RAD ONC ARIA COURSE LAST TREATMENT DATE: NORMAL
RAD ONC ARIA COURSE START DATE: NORMAL
RAD ONC ARIA COURSE TREATMENT ELAPSED DAYS: 8
RAD ONC ARIA FIRST TREATMENT DATE: NORMAL
RAD ONC ARIA PLAN FRACTIONS TREATED TO DATE: 2
RAD ONC ARIA PLAN ID: NORMAL
RAD ONC ARIA PLAN PRESCRIBED DOSE PER FRACTION: 2.7 GY
RAD ONC ARIA PLAN PRIMARY REFERENCE POINT: NORMAL
RAD ONC ARIA PLAN TOTAL FRACTIONS PRESCRIBED: 11
RAD ONC ARIA PLAN TOTAL PRESCRIBED DOSE: 2970 CGY
RAD ONC ARIA REFERENCE POINT DOSAGE GIVEN TO DATE: 18.2 GY
RAD ONC ARIA REFERENCE POINT ID: NORMAL
RAD ONC ARIA REFERENCE POINT SESSION DOSAGE GIVEN: 2.7 GY

## 2025-03-11 PROCEDURE — 77385: CPT | Performed by: INTERNAL MEDICINE

## 2025-03-11 PROCEDURE — 77014 CHG CT GUIDANCE RADIATION THERAPY FLDS PLACEMENT: CPT | Performed by: INTERNAL MEDICINE

## 2025-03-12 ENCOUNTER — HOSPITAL ENCOUNTER (OUTPATIENT)
Dept: RADIATION ONCOLOGY | Facility: HOSPITAL | Age: 75
Discharge: HOME | End: 2025-03-12

## 2025-03-12 LAB
RAD ONC ARIA COURSE ID: NORMAL
RAD ONC ARIA COURSE LAST TREATMENT DATE: NORMAL
RAD ONC ARIA COURSE START DATE: NORMAL
RAD ONC ARIA COURSE TREATMENT ELAPSED DAYS: 9
RAD ONC ARIA FIRST TREATMENT DATE: NORMAL
RAD ONC ARIA PLAN FRACTIONS TREATED TO DATE: 3
RAD ONC ARIA PLAN ID: NORMAL
RAD ONC ARIA PLAN PRESCRIBED DOSE PER FRACTION: 2.7 GY
RAD ONC ARIA PLAN PRIMARY REFERENCE POINT: NORMAL
RAD ONC ARIA PLAN TOTAL FRACTIONS PRESCRIBED: 11
RAD ONC ARIA PLAN TOTAL PRESCRIBED DOSE: 2970 CGY
RAD ONC ARIA REFERENCE POINT DOSAGE GIVEN TO DATE: 20.9 GY
RAD ONC ARIA REFERENCE POINT ID: NORMAL
RAD ONC ARIA REFERENCE POINT SESSION DOSAGE GIVEN: 2.7 GY

## 2025-03-12 PROCEDURE — 77014 CHG CT GUIDANCE RADIATION THERAPY FLDS PLACEMENT: CPT | Performed by: INTERNAL MEDICINE

## 2025-03-12 PROCEDURE — 77385: CPT | Performed by: INTERNAL MEDICINE

## 2025-03-13 ENCOUNTER — HOSPITAL ENCOUNTER (OUTPATIENT)
Dept: RADIATION ONCOLOGY | Facility: HOSPITAL | Age: 75
Discharge: HOME | End: 2025-03-13

## 2025-03-13 LAB
RAD ONC ARIA COURSE ID: NORMAL
RAD ONC ARIA COURSE LAST TREATMENT DATE: NORMAL
RAD ONC ARIA COURSE START DATE: NORMAL
RAD ONC ARIA COURSE TREATMENT ELAPSED DAYS: 10
RAD ONC ARIA FIRST TREATMENT DATE: NORMAL
RAD ONC ARIA PLAN FRACTIONS TREATED TO DATE: 4
RAD ONC ARIA PLAN ID: NORMAL
RAD ONC ARIA PLAN PRESCRIBED DOSE PER FRACTION: 2.7 GY
RAD ONC ARIA PLAN PRIMARY REFERENCE POINT: NORMAL
RAD ONC ARIA PLAN TOTAL FRACTIONS PRESCRIBED: 11
RAD ONC ARIA PLAN TOTAL PRESCRIBED DOSE: 2970 CGY
RAD ONC ARIA REFERENCE POINT DOSAGE GIVEN TO DATE: 23.6 GY
RAD ONC ARIA REFERENCE POINT ID: NORMAL
RAD ONC ARIA REFERENCE POINT SESSION DOSAGE GIVEN: 2.7 GY

## 2025-03-13 PROCEDURE — 77385: CPT | Performed by: INTERNAL MEDICINE

## 2025-03-13 PROCEDURE — 77014 CHG CT GUIDANCE RADIATION THERAPY FLDS PLACEMENT: CPT | Performed by: INTERNAL MEDICINE

## 2025-03-13 PROCEDURE — 77336 RADIATION PHYSICS CONSULT: CPT | Performed by: INTERNAL MEDICINE

## 2025-03-14 ENCOUNTER — HOSPITAL ENCOUNTER (OUTPATIENT)
Dept: RADIATION ONCOLOGY | Facility: HOSPITAL | Age: 75
Discharge: HOME | End: 2025-03-14

## 2025-03-14 LAB
RAD ONC ARIA COURSE ID: NORMAL
RAD ONC ARIA COURSE LAST TREATMENT DATE: NORMAL
RAD ONC ARIA COURSE START DATE: NORMAL
RAD ONC ARIA COURSE TREATMENT ELAPSED DAYS: 11
RAD ONC ARIA FIRST TREATMENT DATE: NORMAL
RAD ONC ARIA PLAN FRACTIONS TREATED TO DATE: 5
RAD ONC ARIA PLAN ID: NORMAL
RAD ONC ARIA PLAN PRESCRIBED DOSE PER FRACTION: 2.7 GY
RAD ONC ARIA PLAN PRIMARY REFERENCE POINT: NORMAL
RAD ONC ARIA PLAN TOTAL FRACTIONS PRESCRIBED: 11
RAD ONC ARIA PLAN TOTAL PRESCRIBED DOSE: 2970 CGY
RAD ONC ARIA REFERENCE POINT DOSAGE GIVEN TO DATE: 26.3 GY
RAD ONC ARIA REFERENCE POINT ID: NORMAL
RAD ONC ARIA REFERENCE POINT SESSION DOSAGE GIVEN: 2.7 GY

## 2025-03-14 PROCEDURE — 77014 CHG CT GUIDANCE RADIATION THERAPY FLDS PLACEMENT: CPT | Performed by: INTERNAL MEDICINE

## 2025-03-14 PROCEDURE — 77385: CPT | Performed by: INTERNAL MEDICINE

## 2025-03-17 ENCOUNTER — RADIATION ONCOLOGY WEEKLY ASSESSMENT (OUTPATIENT)
Dept: RADIATION ONCOLOGY | Facility: HOSPITAL | Age: 75
End: 2025-03-17
Payer: MEDICARE

## 2025-03-17 ENCOUNTER — HOSPITAL ENCOUNTER (OUTPATIENT)
Dept: RADIATION ONCOLOGY | Facility: HOSPITAL | Age: 75
Discharge: HOME | End: 2025-03-17
Payer: MEDICARE

## 2025-03-17 VITALS
SYSTOLIC BLOOD PRESSURE: 175 MMHG | BODY MASS INDEX: 37.25 KG/M2 | HEART RATE: 78 BPM | RESPIRATION RATE: 18 BRPM | OXYGEN SATURATION: 97 % | WEIGHT: 217 LBS | DIASTOLIC BLOOD PRESSURE: 91 MMHG

## 2025-03-17 DIAGNOSIS — Z17.0 MALIGNANT NEOPLASM OF UPPER-OUTER QUADRANT OF RIGHT BREAST IN FEMALE, ESTROGEN RECEPTOR POSITIVE: Primary | ICD-10-CM

## 2025-03-17 DIAGNOSIS — C50.411 MALIGNANT NEOPLASM OF UPPER-OUTER QUADRANT OF RIGHT BREAST IN FEMALE, ESTROGEN RECEPTOR POSITIVE: Primary | ICD-10-CM

## 2025-03-17 LAB
RAD ONC ARIA COURSE ID: NORMAL
RAD ONC ARIA COURSE LAST TREATMENT DATE: NORMAL
RAD ONC ARIA COURSE START DATE: NORMAL
RAD ONC ARIA COURSE TREATMENT ELAPSED DAYS: 14
RAD ONC ARIA FIRST TREATMENT DATE: NORMAL
RAD ONC ARIA PLAN FRACTIONS TREATED TO DATE: 6
RAD ONC ARIA PLAN ID: NORMAL
RAD ONC ARIA PLAN PRESCRIBED DOSE PER FRACTION: 2.7 GY
RAD ONC ARIA PLAN PRIMARY REFERENCE POINT: NORMAL
RAD ONC ARIA PLAN TOTAL FRACTIONS PRESCRIBED: 11
RAD ONC ARIA PLAN TOTAL PRESCRIBED DOSE: 2970 CGY
RAD ONC ARIA REFERENCE POINT DOSAGE GIVEN TO DATE: 29 GY
RAD ONC ARIA REFERENCE POINT ID: NORMAL
RAD ONC ARIA REFERENCE POINT SESSION DOSAGE GIVEN: 2.7 GY

## 2025-03-17 PROCEDURE — 77385: CPT | Performed by: INTERNAL MEDICINE

## 2025-03-17 PROCEDURE — FACE2FACE: Performed by: INTERNAL MEDICINE

## 2025-03-17 PROCEDURE — 77427 RADIATION TX MANAGEMENT X5: CPT | Performed by: INTERNAL MEDICINE

## 2025-03-17 PROCEDURE — 77014 CHG CT GUIDANCE RADIATION THERAPY FLDS PLACEMENT: CPT | Performed by: INTERNAL MEDICINE

## 2025-03-17 NOTE — PROGRESS NOTES
[facility] RADIATION ONCOLOGY  ON-TREATMENT VISIT    NAME: Jeannie Ruiz  YOB: 1950  MRN #: [id number]  DATE OF SERVICE: 3/17/2025  PRESCRIBING PHYSICIAN: Robert Lovelace MD    DIAGNOSIS:      ICD-10-CM ICD-9-CM   1. Malignant neoplasm of upper-outer quadrant of right breast in female, estrogen receptor positive  C50.411 174.4    Z17.0 V86.0        RADIATION THERAPY VISIT:  Continue radiation therapy, Dosimetry plan remains acceptable, Films reviewed and remains acceptable, Pain assessed, Pain management planned, Radiation dose schedule reviewed and remains acceptable, Radiation technique remains acceptable, and Symptoms within expected range    Radiation Treatments       Active   Plans   DIBHRScAxCWal   Most recent treatment: Dose planned: 200 cGy (fraction 1 on 3/3/2025)   Total: Dose planned: 5,000 cGy (25 fractions)   Elapsed Days: 0      DIBHRScAxCWal:1   Most recent treatment: Dose planned: 270 cGy (fraction 4 on 3/7/2025)   Total: Dose planned: 4,050 cGy (15 fractions)   Elapsed Days: 4      DIBHRScAxCWal:2   Most recent treatment: Dose planned: 270 cGy (fraction 6 on 3/17/2025)   Total: Dose planned: 2,970 cGy (11 fractions)   Elapsed Days: 14      Reference Points   RtBreast   Most recent treatment: Dose given: 270 cGy (on 3/17/2025)   Total: Dose given: 2,900 cGy   Elapsed Days: 14                    [] Concurrent Chemo   Regimen:       PHYSICAL ASSESSMENT         Vitals:    03/17/25 1507   BP: 175/91   Pulse: 78   Resp: 18   SpO2: 97%        Wt Readings from Last 3 Encounters:   03/17/25 98.4 kg (217 lb)   03/10/25 99.8 kg (220 lb)   03/03/25 99.8 kg (220 lb)     Restricted in physically strenuous activity but ambulatory and able to carry out work of a light or sedentary nature, e.g., light house work, office work = 1    We examined the relevant areas: yes  Findings are within the expected range for this stage of treatment: yes    ACTION ITEMS     Patient tolerating  treatment well and as expected for this stage in their treatment and Continue radiation therapy as planned    Estimated Completion Date: 4/4/2025    Anticipatory guidance provided.    Reviewed appropriate skin care.     Patient reports stable pain in the right axilla and mild pink ending of the skin.  Overall tolerating treatment well.      Robert Lovelace MD  Radiation Oncology  [facility]

## 2025-03-18 ENCOUNTER — TELEPHONE (OUTPATIENT)
Dept: RADIATION ONCOLOGY | Facility: HOSPITAL | Age: 75
End: 2025-03-18
Payer: MEDICARE

## 2025-03-18 ENCOUNTER — HOSPITAL ENCOUNTER (OUTPATIENT)
Dept: RADIATION ONCOLOGY | Facility: HOSPITAL | Age: 75
Discharge: HOME | End: 2025-03-18

## 2025-03-18 DIAGNOSIS — T66.XXXA RADIATION-INDUCED ESOPHAGITIS: Primary | ICD-10-CM

## 2025-03-18 DIAGNOSIS — K20.80 RADIATION-INDUCED ESOPHAGITIS: Primary | ICD-10-CM

## 2025-03-18 LAB
RAD ONC ARIA COURSE ID: NORMAL
RAD ONC ARIA COURSE LAST TREATMENT DATE: NORMAL
RAD ONC ARIA COURSE START DATE: NORMAL
RAD ONC ARIA COURSE TREATMENT ELAPSED DAYS: 15
RAD ONC ARIA FIRST TREATMENT DATE: NORMAL
RAD ONC ARIA PLAN FRACTIONS TREATED TO DATE: 7
RAD ONC ARIA PLAN ID: NORMAL
RAD ONC ARIA PLAN PRESCRIBED DOSE PER FRACTION: 2.7 GY
RAD ONC ARIA PLAN PRIMARY REFERENCE POINT: NORMAL
RAD ONC ARIA PLAN TOTAL FRACTIONS PRESCRIBED: 11
RAD ONC ARIA PLAN TOTAL PRESCRIBED DOSE: 2970 CGY
RAD ONC ARIA REFERENCE POINT DOSAGE GIVEN TO DATE: 31.7 GY
RAD ONC ARIA REFERENCE POINT ID: NORMAL
RAD ONC ARIA REFERENCE POINT SESSION DOSAGE GIVEN: 2.7 GY

## 2025-03-18 PROCEDURE — 77014 CHG CT GUIDANCE RADIATION THERAPY FLDS PLACEMENT: CPT | Performed by: INTERNAL MEDICINE

## 2025-03-18 PROCEDURE — 77385: CPT | Performed by: INTERNAL MEDICINE

## 2025-03-18 RX ORDER — ALUMINUM HYDROXIDE, MAGNESIUM HYDROXIDE, SIMETHICONE 400; 400; 40 MG/10ML; MG/10ML; MG/10ML
SUSPENSION ORAL
Qty: 355 ML | Refills: 0 | Status: SHIPPED | OUTPATIENT
Start: 2025-03-18

## 2025-03-18 RX ORDER — LIDOCAINE HYDROCHLORIDE 20 MG/ML
SOLUTION OROPHARYNGEAL
Qty: 100 ML | Refills: 2 | Status: SHIPPED | OUTPATIENT
Start: 2025-03-18

## 2025-03-18 NOTE — TELEPHONE ENCOUNTER
Radiation Oncology Nurse Note    Received voicemail from patient with complaints of increasing esophagitis.  Returned call to patient to discuss symptoms and possible treatment options. Patient agreed to try Maalox/Lidocaine mixture. Prescription for both sent to [facility] with administration instructions. Patient agreed with plan and verbalized understanding.    Evette Acuña RN, BSN  Radiation Oncology Department  [facility]

## 2025-03-19 ENCOUNTER — HOSPITAL ENCOUNTER (OUTPATIENT)
Dept: RADIATION ONCOLOGY | Facility: HOSPITAL | Age: 75
Setting detail: RADIATION/ONCOLOGY SERIES
Discharge: HOME | End: 2025-03-19
Payer: MEDICARE

## 2025-03-19 LAB
RAD ONC ARIA COURSE ID: NORMAL
RAD ONC ARIA COURSE LAST TREATMENT DATE: NORMAL
RAD ONC ARIA COURSE START DATE: NORMAL
RAD ONC ARIA COURSE TREATMENT ELAPSED DAYS: 16
RAD ONC ARIA FIRST TREATMENT DATE: NORMAL
RAD ONC ARIA PLAN FRACTIONS TREATED TO DATE: 8
RAD ONC ARIA PLAN ID: NORMAL
RAD ONC ARIA PLAN PRESCRIBED DOSE PER FRACTION: 2.7 GY
RAD ONC ARIA PLAN PRIMARY REFERENCE POINT: NORMAL
RAD ONC ARIA PLAN TOTAL FRACTIONS PRESCRIBED: 11
RAD ONC ARIA PLAN TOTAL PRESCRIBED DOSE: 2970 CGY
RAD ONC ARIA REFERENCE POINT DOSAGE GIVEN TO DATE: 34.4 GY
RAD ONC ARIA REFERENCE POINT ID: NORMAL
RAD ONC ARIA REFERENCE POINT SESSION DOSAGE GIVEN: 2.7 GY

## 2025-03-19 PROCEDURE — 77014 CHG CT GUIDANCE RADIATION THERAPY FLDS PLACEMENT: CPT | Performed by: INTERNAL MEDICINE

## 2025-03-19 PROCEDURE — 77385: CPT | Performed by: INTERNAL MEDICINE

## 2025-03-20 ENCOUNTER — HOSPITAL ENCOUNTER (OUTPATIENT)
Dept: RADIATION ONCOLOGY | Facility: HOSPITAL | Age: 75
Setting detail: RADIATION/ONCOLOGY SERIES
Discharge: HOME | End: 2025-03-20
Payer: MEDICARE

## 2025-03-20 LAB
RAD ONC ARIA COURSE ID: NORMAL
RAD ONC ARIA COURSE LAST TREATMENT DATE: NORMAL
RAD ONC ARIA COURSE START DATE: NORMAL
RAD ONC ARIA COURSE TREATMENT ELAPSED DAYS: 17
RAD ONC ARIA FIRST TREATMENT DATE: NORMAL
RAD ONC ARIA PLAN FRACTIONS TREATED TO DATE: 9
RAD ONC ARIA PLAN ID: NORMAL
RAD ONC ARIA PLAN PRESCRIBED DOSE PER FRACTION: 2.7 GY
RAD ONC ARIA PLAN PRIMARY REFERENCE POINT: NORMAL
RAD ONC ARIA PLAN TOTAL FRACTIONS PRESCRIBED: 11
RAD ONC ARIA PLAN TOTAL PRESCRIBED DOSE: 2970 CGY
RAD ONC ARIA REFERENCE POINT DOSAGE GIVEN TO DATE: 37.1 GY
RAD ONC ARIA REFERENCE POINT ID: NORMAL
RAD ONC ARIA REFERENCE POINT SESSION DOSAGE GIVEN: 2.7 GY

## 2025-03-20 PROCEDURE — 77336 RADIATION PHYSICS CONSULT: CPT | Performed by: INTERNAL MEDICINE

## 2025-03-20 PROCEDURE — 77385: CPT | Performed by: INTERNAL MEDICINE

## 2025-03-20 PROCEDURE — 77014 CHG CT GUIDANCE RADIATION THERAPY FLDS PLACEMENT: CPT | Performed by: INTERNAL MEDICINE

## 2025-03-21 ENCOUNTER — HOSPITAL ENCOUNTER (OUTPATIENT)
Dept: RADIATION ONCOLOGY | Facility: HOSPITAL | Age: 75
Setting detail: RADIATION/ONCOLOGY SERIES
Discharge: HOME | End: 2025-03-21
Payer: MEDICARE

## 2025-03-21 LAB
RAD ONC ARIA COURSE ID: NORMAL
RAD ONC ARIA COURSE LAST TREATMENT DATE: NORMAL
RAD ONC ARIA COURSE START DATE: NORMAL
RAD ONC ARIA COURSE TREATMENT ELAPSED DAYS: 18
RAD ONC ARIA FIRST TREATMENT DATE: NORMAL
RAD ONC ARIA PLAN FRACTIONS TREATED TO DATE: 10
RAD ONC ARIA PLAN ID: NORMAL
RAD ONC ARIA PLAN PRESCRIBED DOSE PER FRACTION: 2.7 GY
RAD ONC ARIA PLAN PRIMARY REFERENCE POINT: NORMAL
RAD ONC ARIA PLAN TOTAL FRACTIONS PRESCRIBED: 11
RAD ONC ARIA PLAN TOTAL PRESCRIBED DOSE: 2970 CGY
RAD ONC ARIA REFERENCE POINT DOSAGE GIVEN TO DATE: 39.8 GY
RAD ONC ARIA REFERENCE POINT ID: NORMAL
RAD ONC ARIA REFERENCE POINT SESSION DOSAGE GIVEN: 2.7 GY

## 2025-03-21 PROCEDURE — 77385: CPT | Performed by: INTERNAL MEDICINE

## 2025-03-21 PROCEDURE — 77014 CHG CT GUIDANCE RADIATION THERAPY FLDS PLACEMENT: CPT | Performed by: INTERNAL MEDICINE

## 2025-03-24 ENCOUNTER — HOSPITAL ENCOUNTER (OUTPATIENT)
Dept: RADIATION ONCOLOGY | Facility: HOSPITAL | Age: 75
Setting detail: RADIATION/ONCOLOGY SERIES
Discharge: HOME | End: 2025-03-24
Payer: MEDICARE

## 2025-03-24 ENCOUNTER — RADIATION ONCOLOGY WEEKLY ASSESSMENT (OUTPATIENT)
Dept: RADIATION ONCOLOGY | Facility: HOSPITAL | Age: 75
End: 2025-03-24
Payer: MEDICARE

## 2025-03-24 ENCOUNTER — TREATMENT (OUTPATIENT)
Dept: RADIATION ONCOLOGY | Facility: HOSPITAL | Age: 75
End: 2025-03-24

## 2025-03-24 DIAGNOSIS — Z17.0 MALIGNANT NEOPLASM OF UPPER-OUTER QUADRANT OF RIGHT BREAST IN FEMALE, ESTROGEN RECEPTOR POSITIVE: Primary | ICD-10-CM

## 2025-03-24 DIAGNOSIS — C50.411 MALIGNANT NEOPLASM OF UPPER-OUTER QUADRANT OF RIGHT BREAST IN FEMALE, ESTROGEN RECEPTOR POSITIVE: Primary | ICD-10-CM

## 2025-03-24 LAB
RAD ONC ARIA COURSE ID: NORMAL
RAD ONC ARIA COURSE LAST TREATMENT DATE: NORMAL
RAD ONC ARIA COURSE START DATE: NORMAL
RAD ONC ARIA COURSE TREATMENT ELAPSED DAYS: 21
RAD ONC ARIA FIRST TREATMENT DATE: NORMAL
RAD ONC ARIA PLAN FRACTIONS TREATED TO DATE: 11
RAD ONC ARIA PLAN ID: NORMAL
RAD ONC ARIA PLAN PRESCRIBED DOSE PER FRACTION: 2.7 GY
RAD ONC ARIA PLAN PRIMARY REFERENCE POINT: NORMAL
RAD ONC ARIA PLAN TOTAL FRACTIONS PRESCRIBED: 11
RAD ONC ARIA PLAN TOTAL PRESCRIBED DOSE: 2970 CGY
RAD ONC ARIA REFERENCE POINT DOSAGE GIVEN TO DATE: 42.5 GY
RAD ONC ARIA REFERENCE POINT ID: NORMAL
RAD ONC ARIA REFERENCE POINT SESSION DOSAGE GIVEN: 2.7 GY

## 2025-03-24 PROCEDURE — 77385: CPT | Performed by: INTERNAL MEDICINE

## 2025-03-24 NOTE — PROGRESS NOTES
[facility] RADIATION ONCOLOGY  TREATMENT SUMMARY    Name: Jeannie Ruiz  YOB: 1950  MRN #: [id number]  Date of Service: 3/24/2025  Radiation Oncologist:  Robert Lovelace MD       Diagnosis:    Encounter Diagnosis   Name Primary?    Malignant neoplasm of upper-outer quadrant of right breast in female, estrogen receptor positive Yes        Radiation Treatment Course       Treating Physician: Robert Lovelace MD      Start: 03/03/2025     End: 03/24/2025   Site: Rt Breast/Ax/SC    Total Dose: 4250 cGy    Dose/Fraction: 270 cGy    Total Fractions: 16 Daily or BID: Daily  Modality: Photon  Technique: IMRT/VMAT/Rapid Arc  Bolus: No       Patient Tolerated Treatment Without Unexpected Side Effects/Complications: Yes  Final Delivered Dose Deviated From Initially Prescribed Dose: No  Concurrent Chemotherapy: No     ECOG: Fully active, able to carry on all pre-disease performance without restriction = 0  Pain Management Plan: None Indicated/PRN OTC        Follow-Up       Follow-Up Plan: 1-week for skin assessment with radiation nurse    Imaging Ordered for Follow-Up: None/NA        Discharge Instructions       The following instructions were provided to the patient and/or family in their printed after visit summary (AVS) as well as discussed in-person by the radiation oncology nurse or medical assistant. The patient and/or family had the opportunity to ask questions and they verbalized their questions were adequately answered. Patient is encouraged to contact our department with any questions or concerns after completion of treatment.      RADIATION THERAPY DISCHARGE INSTRUCTIONS  Breast    CONGRATULATIONS! You completed 16 radiation treatments for treatment of your right breast cancer. These discharge instructions are important for you to follow until your one-month follow up appointment with your radiation oncologist. Please make sure to review these instructions and call the Radiation  Oncology Department if you have any questions or concerns with symptoms you may experience. Thank you for trusting us with your cancer treatment!    DIET  Eat a regular, well balanced diet that is high in protein such as meat, eggs, cheese, and nut butters.  Drink 48 to 64 ounces of fluid daily.    MEDICATIONS  Use Tylenol as needed to decrease breast discomfort and irritation due to swelling and skin reaction.    SKIN CARE  Wash treated skin gently with your hands using a mild, non-drying soap such as Dove® or Aveeno® until skin returns to normal  Pat skin dry - do not rub.  Keep treated skin moist with twice daily applications of Eucerin® or Aquaphor®.  Always protect your treated skin when outdoors by wearing protective clothing and applying sunscreen SPF 15 or higher at least 30 minutes before going outdoors and reapply frequently.     ACTIVITY  Fatigue is a normal side effect of radiation therapy and should improve over time  Alternate rest and activity.  Exercise such as walking may help to improve your fatigue.    FOLLOW-UP  Continue follow-up appointments with all other doctors as necessary.  Continue to have regular mammograms as ordered by your physician.  Call your radiation oncologist or nurse if you are concerned with any side effects you are experiencing.    SPECIAL INSTRUCTIONS  Perform self-breast exams monthly.  (If applicable) Continue all range of motion and mobility exercise as instructed.  (If applicable) Never allow blood draws or blood pressures to be taken on your affected arm unless in an emergency situation.  Practice careful nail care and avoid open skin to your affected arm and hand.  Continue performing the on-treatment skin care routine recommended by your radiation oncologist until your one-month follow-up appointment.    WHEN TO CALL YOUR RADIATION ONCOLOGIST OR NURSE: [phone]  You notice swelling of your affected arm or hand that is unusual or doesn't go away.  You have a  fever of 100.4 OF or higher.  You have chills.  Your pain or discomfort is getting worse.  Your skin in the treatment area is getting more red or swollen, feels hard or hot, has a rash or blisters, and/or is itchy.  You see drainage (liquid) coming from your skin in the treatment area.  You see any new open areas (wounds) or changes to your skin.  You have any questions or concerns.    _____________________________________________________________    Completed by: Evette Acuña RN on 3/24/2025 at 09:05 EDT        __________________________________________________________________________      I have reviewed and confirmed the accuracy of the EOT NOTE as documented by the MA/CUAUHTEMOC/AUGUST Lovelace MD        CC: No ref. provider found

## 2025-03-24 NOTE — PATIENT INSTRUCTIONS
RADIATION THERAPY DISCHARGE INSTRUCTIONS  Breast    CONGRATULATIONS! You completed 16 radiation treatments for treatment of your right breast cancer. These discharge instructions are important for you to follow until your one-month follow up appointment with your radiation oncologist. Please make sure to review these instructions and call the Radiation Oncology Department if you have any questions or concerns with symptoms you may experience. Thank you for trusting us with your cancer treatment!    DIET  Eat a regular, well balanced diet that is high in protein such as meat, eggs, cheese, and nut butters.  Drink 48 to 64 ounces of fluid daily.    MEDICATIONS  Use Tylenol as needed to decrease breast discomfort and irritation due to swelling and skin reaction.    SKIN CARE  Wash treated skin gently with your hands using a mild, non-drying soap such as Dove® or Aveeno® until skin returns to normal  Pat skin dry - do not rub.  Keep treated skin moist with twice daily applications of Eucerin® or Aquaphor®.  Always protect your treated skin when outdoors by wearing protective clothing and applying sunscreen SPF 15 or higher at least 30 minutes before going outdoors and reapply frequently.     ACTIVITY  Fatigue is a normal side effect of radiation therapy and should improve over time  Alternate rest and activity.  Exercise such as walking may help to improve your fatigue.    FOLLOW-UP  Continue follow-up appointments with all other doctors as necessary.  Continue to have regular mammograms as ordered by your physician.  Call your radiation oncologist or nurse if you are concerned with any side effects you are experiencing.    SPECIAL INSTRUCTIONS  Perform self-breast exams monthly.  (If applicable) Continue all range of motion and mobility exercise as instructed.  (If applicable) Never allow blood draws or blood pressures to be taken on your affected arm unless in an emergency situation.  Practice careful nail care and  avoid open skin to your affected arm and hand.  Continue performing the on-treatment skin care routine recommended by your radiation oncologist until your one-month follow-up appointment.    WHEN TO CALL YOUR RADIATION ONCOLOGIST OR NURSE: [phone]  You notice swelling of your affected arm or hand that is unusual or doesn't go away.  You have a fever of 100.4 OF or higher.  You have chills.  Your pain or discomfort is getting worse.  Your skin in the treatment area is getting more red or swollen, feels hard or hot, has a rash or blisters, and/or is itchy.  You see drainage (liquid) coming from your skin in the treatment area.  You see any new open areas (wounds) or changes to your skin.  You have any questions or concerns.    _______________________________________________________________________    Completed by: Evette Acuña RN on 3/24/2025 at 09:05 EDT

## 2025-03-24 NOTE — PROGRESS NOTES
[facility] RADIATION ONCOLOGY  ON-TREATMENT VISIT    NAME: Jeannie Ruiz  YOB: 1950  MRN #: [id number]  DATE OF SERVICE: 3/24/2025  PRESCRIBING PHYSICIAN: Robert Lovelace MD    DIAGNOSIS:      ICD-10-CM ICD-9-CM   1. Malignant neoplasm of upper-outer quadrant of right breast in female, estrogen receptor positive  C50.411 174.4    Z17.0 V86.0          RADIATION THERAPY VISIT:  Continue radiation therapy, Dosimetry plan remains acceptable, Films reviewed and remains acceptable, Pain assessed, Pain management planned, Radiation dose schedule reviewed and remains acceptable, Radiation technique remains acceptable, and Symptoms within expected range    Radiation Treatments       Active   Plans   DIBHRScAxCWal   Most recent treatment: Dose planned: 200 cGy (fraction 1 on 3/3/2025)   Total: Dose planned: 5,000 cGy (25 fractions)   Elapsed Days: 0      DIBHRScAxCWal:1   Most recent treatment: Dose planned: 270 cGy (fraction 4 on 3/7/2025)   Total: Dose planned: 4,050 cGy (15 fractions)   Elapsed Days: 4      Reference Points   RtBreast   Most recent treatment: Dose given: 270 cGy (on 3/24/2025)   Total: Dose given: 4,250 cGy   Elapsed Days: 21                    [] Concurrent Chemo   Regimen:       PHYSICAL ASSESSMENT         There were no vitals filed for this visit.       Wt Readings from Last 3 Encounters:   03/17/25 98.4 kg (217 lb)   03/10/25 99.8 kg (220 lb)   03/03/25 99.8 kg (220 lb)     Restricted in physically strenuous activity but ambulatory and able to carry out work of a light or sedentary nature, e.g., light house work, office work = 1    We examined the relevant areas: yes  Findings are within the expected range for this stage of treatment: yes    ACTION ITEMS     Patient tolerating treatment well and as expected for this stage in their treatment and Continue radiation therapy as planned    Estimated Completion Date: 4/4/2025    Anticipatory guidance provided.    Reviewed  appropriate skin care.     Skin check in 1 week.     Discussed anticipated posttreatment recovery and follow-up plan.    Robert Lovelace MD  Radiation Oncology  [facility]

## 2025-03-27 ENCOUNTER — TELEPHONE (OUTPATIENT)
Dept: RADIATION ONCOLOGY | Facility: HOSPITAL | Age: 75
End: 2025-03-27
Payer: MEDICARE

## 2025-03-27 LAB
RAD ONC ARIA COURSE END DATE: NORMAL
RAD ONC ARIA COURSE ID: NORMAL
RAD ONC ARIA COURSE LAST TREATMENT DATE: NORMAL
RAD ONC ARIA COURSE START DATE: NORMAL
RAD ONC ARIA COURSE TREATMENT ELAPSED DAYS: 21
RAD ONC ARIA FIRST TREATMENT DATE: NORMAL
RAD ONC ARIA PLAN FRACTIONS TREATED TO DATE: 1
RAD ONC ARIA PLAN FRACTIONS TREATED TO DATE: 11
RAD ONC ARIA PLAN FRACTIONS TREATED TO DATE: 4
RAD ONC ARIA PLAN ID: NORMAL
RAD ONC ARIA PLAN NAME: NORMAL
RAD ONC ARIA PLAN PRESCRIBED DOSE PER FRACTION: 2 GY
RAD ONC ARIA PLAN PRESCRIBED DOSE PER FRACTION: 2.7 GY
RAD ONC ARIA PLAN PRESCRIBED DOSE PER FRACTION: 2.7 GY
RAD ONC ARIA PLAN PRIMARY REFERENCE POINT: NORMAL
RAD ONC ARIA PLAN TOTAL FRACTIONS PRESCRIBED: 1
RAD ONC ARIA PLAN TOTAL FRACTIONS PRESCRIBED: 11
RAD ONC ARIA PLAN TOTAL FRACTIONS PRESCRIBED: 15
RAD ONC ARIA PLAN TOTAL PRESCRIBED DOSE: 2970 CGY
RAD ONC ARIA PLAN TOTAL PRESCRIBED DOSE: 4050 CGY
RAD ONC ARIA PLAN TOTAL PRESCRIBED DOSE: 5000 CGY
RAD ONC ARIA REFERENCE POINT DOSAGE GIVEN TO DATE: 42.5 GY
RAD ONC ARIA REFERENCE POINT ID: NORMAL

## 2025-03-27 NOTE — TELEPHONE ENCOUNTER
Radiation Oncology Nurse Note    Received 2 voicemails from patient's daughter, Rowena, with concerns about patient's newly developed right FA swelling. Returned call to patient to discuss. Patient stated the edema has improved since it developed on Wednesday. She noticed it after her massage on Tuesday, below her elbow and above her wrist. No associated pain or redness noted. She stated has no concerns at this point. She will be here tomorrow for toxicity check. Stated we will assess right FA swelling at that time. Patient verbalized understanding and agreed with plan. She was encouraged to reach out with any other questions or issues.     Evette Acuña RN, BSN  Radiation Oncology Department  [facility]

## 2025-03-28 ENCOUNTER — OFFICE VISIT (OUTPATIENT)
Dept: RADIATION ONCOLOGY | Facility: HOSPITAL | Age: 75
End: 2025-03-28
Payer: MEDICARE

## 2025-03-28 VITALS — BODY MASS INDEX: 37.25 KG/M2 | WEIGHT: 217 LBS | RESPIRATION RATE: 18 BRPM

## 2025-03-28 DIAGNOSIS — C50.411 MALIGNANT NEOPLASM OF UPPER-OUTER QUADRANT OF RIGHT BREAST IN FEMALE, ESTROGEN RECEPTOR POSITIVE: Primary | ICD-10-CM

## 2025-03-28 DIAGNOSIS — Z17.0 MALIGNANT NEOPLASM OF UPPER-OUTER QUADRANT OF RIGHT BREAST IN FEMALE, ESTROGEN RECEPTOR POSITIVE: Primary | ICD-10-CM

## 2025-03-29 NOTE — PROGRESS NOTES
[facility] Radiation Oncology   Follow Up    Chief Complaint  Skin toxicity check after completing radiation      Diagnosis:      Diagnosis Plan   1. Malignant neoplasm of upper-outer quadrant of right breast in female, estrogen receptor positive              Interval History:    Jeannie Ruiz presents for a 1 week post treatment toxicity check after completing right breast and regional zenaida radiation therapy on 3/24/2025.     The patient reports persistent erythema/radiation dermatitis over the right breast. It is particularly painful over the lateral breast and towards the right axilla. She feels some increased swelling above the right lumpectomy and lymph node resection scar.       Imaging:      Interpretation of Outside CT Abdomen and or Pelvis  Result Date: 3/3/2025  No evidence of abdominopelvic metastatic disease.    Interpretation of Outside CT Chest  Result Date: 2/28/2025  1. Postoperative changes interval right breast lumpectomy and axilla lymph node dissection. The new 47 x 26 x 21 mm intermediate density collection in the right breast at the operative bed likely represents postoperative change/resolving hematoma. 2. Multiple new scattered tiny solid pulmonary nodules measuring up to 4 mm are indeterminate for metastases. 3. Stable findings of prior granulomatous infection. CT follow-up suggested in 3 months. 4. Please refer to the separate CT abdomen/pelvis report for abdominal findings.    Interpretation of Outside NM General  Result Date: 2/27/2025  No abnormal radiotracer activity to indicate osseous metastatic disease.    CT Lower Extremity Right With Contrast  Result Date: 2/10/2025  Impression: 1.Density in the subcutaneous tissues at the medial right thigh is less conspicuous than on the prior PET/CT and has a linear morphology and no definite nodular or masslike components at this time. This may again be related to scarring from prior vein graft harvest site. Correlate with surgical history.  2.No other suspicious soft tissue abnormality of the right thigh at this time. 3.Mild arthropathy of the hip and knee. Electronically Signed: Gt Sherman  2/10/2025 11:54 AM EST  Workstation ID: [id number]          Pathology:      No new Pathology to review      Labs:    Lab Results   Component Value Date    CREATININE 1.30 02/06/2025                 Problem List:  Patient Active Problem List   Diagnosis    CAD (coronary artery disease)    Non-STEMI (non-ST elevated myocardial infarction)    HTN (hypertension)    HLD (hyperlipidemia)    Type 2 diabetes mellitus, without long-term current use of insulin    S/P CABG x 3 with HUFFMAN per Dr. Hall 6/6/2024    Postoperative atrial fibrillation    Erythema of wound    Asymptomatic postmenopausal state    Breast cancer screening    Cervical dysplasia    Colon polyposis    Daytime somnolence    Elevated liver enzymes    Fibroadenosis of breast    Hematuria    Nocturnal hypoxemia    Osteoarthritis    Peripheral edema    Recurrent low back pain    Snoring    Stage 3 chronic kidney disease    Transaminitis    Malignant neoplasm of upper-outer quadrant of right breast in female, estrogen receptor positive          Medications:  Current Outpatient Medications on File Prior to Visit   Medication Sig Dispense Refill    Accu-Chek Softclix Lancets lancets 1 each by Other route Daily. Use as instructed Dx code: E11.9 100 each 2    aluminum-magnesium hydroxide-simethicone (MAALOX) 200-200-20 MG/5ML suspension Mix 1 teaspoon (5 mL) viscous Lidocaine 2% with 1 teaspoon (5 mL) Maalox and swallow mixture prior to all meals and at bedtime (four times daily). 355 mL 0    anastrozole (ARIMIDEX) 1 MG tablet Take 1 tablet by mouth Daily. 90 tablet 5    aspirin 81 MG EC tablet Take 1 tablet by mouth Daily.      Blood Glucose Monitoring Suppl (Accu-Chek Guide Me) w/Device kit Use 1 kit Daily. Dx code: E11.9  NDC [address]-41  Bin#: [id number] Group #: [id number]  ID #: [id number]  Issuer #: [id number]) 1 kit  0    cholecalciferol (Vitamin D, Cholecalciferol,) 25 MCG (1000 UT) tablet Take 1 tablet by mouth Daily.      coenzyme Q10 100 MG capsule Take 1 capsule by mouth Daily.      Evolocumab (Repatha) solution prefilled syringe injection Inject 1 mL under the skin into the appropriate area as directed.      Evolocumab (Repatha) solution prefilled syringe injection Inject 140 mg under the skin every 14 (fourteen) days. 6 mL 3    Flaxseed, Linseed, (Flax Seed Oil) 1000 MG capsule Take 1 capsule by mouth Daily.      fluconazole (DIFLUCAN) 150 MG tablet Take 1 tablet (150 mg total) by mouth 1 (one) time for 1 dose. 1 tablet 0    furosemide (Lasix) 40 MG tablet Take 1 tablet by mouth 2 (Two) Times a Day. 180 tablet 3    glucose blood (Accu-Chek Guide) test strip 1 each by Other route Daily. Use as instructed Dx code: E11.9 100 each 2    Lidocaine Viscous HCl (XYLOCAINE) 2 % solution Mix 1 teaspoon (5 mL) viscous Lidocaine 2% with 1 teaspoon (5 mL) Maalox and swallow mixture prior to all meals and at bedtime (four times daily). 100 mL 2    loperamide (Anti-Diarrheal) 2 MG capsule Take 1 capsule by mouth 2 (Two) Times a Day with each dose of abemaciclib 60 capsule 1    lovastatin (MEVACOR) 20 MG tablet Take 1 tablet by mouth Daily.      metoprolol tartrate (LOPRESSOR) 25 MG tablet Take 1 tablet by mouth Every 12 (Twelve) Hours. 180 tablet 3    omega-3 acid ethyl esters (LOVAZA) 1 g capsule Take 1 capsule by mouth.      ondansetron (ZOFRAN) 4 MG tablet Take 1 tablet by mouth Every 8 (Eight) Hours As Needed for Nausea or Vomiting. 90 tablet 3    potassium chloride (KLOR-CON M20) 20 MEQ CR tablet Take 1 tablet by mouth 2 (Two) Times a Day. 180 tablet 3    sulfamethoxazole-trimethoprim (BACTRIM DS,SEPTRA DS) 800-160 MG per tablet Take 1 tablet by mouth 2 (two) times a day for 10 days. 20 tablet 0    traMADol (ULTRAM) 50 MG tablet       VITAMIN E 400 UNIT capsule Take 1 capsule by mouth Daily.       No current facility-administered  "medications on file prior to visit.          Allergies:  Allergies   Allergen Reactions    Statins Other (See Comments)     Muscle aches             Vital Signs:  Resp 18   Wt 98.4 kg (217 lb)   BMI 37.25 kg/m²   Estimated body mass index is 37.25 kg/m² as calculated from the following:    Height as of 2/11/25: 162.6 cm (64\").    Weight as of this encounter: 98.4 kg (217 lb).  Pain Score    03/28/25 1111   PainSc: 0-No pain         ECOG: Restricted in physically strenuous activity but ambulatory and able to carry out work of a light or sedentary nature, e.g., light house work, office work = 1    Physical Exam  Exam conducted with a chaperone present.   Constitutional:       General: She is not in acute distress.  HENT:      Head: Normocephalic and atraumatic.   Pulmonary:      Effort: Pulmonary effort is normal. No respiratory distress.   Chest:      Comments: Radiation dermatitis over the right breast with erythematous confluence and mild swelling over the lateral right breast just above the lumpectomy scar. Tenderness pronounced in the right axilla.   Neurological:      Mental Status: She is alert and oriented to person, place, and time. Mental status is at baseline.   Psychiatric:         Mood and Affect: Mood normal.         Behavior: Behavior normal.            Result Review :             Diagnoses and all orders for this visit:    1. Malignant neoplasm of upper-outer quadrant of right breast in female, estrogen receptor positive (Primary)        Assessment:    Jeannie Ruiz is a 75 y.o. female with a history of NSTEMI, multivessel coronary artery disease and coronary artery bypass with a left internal mammary artery graft on 6/7/2024 who presents with a Stage IB (pT2, pN1a, cM0, G2, ER+, LA+, HER2-, Oncotype DX score: 25) right breast invasive ductal carcinoma. She underwent lumpectomy and SLNB followed by axillary node dissection on 12/19/2024.  She completed adjuvant right breast and regional zenaida " radiation on 3/24/2025, 4250 cGy over 16 fractions. She presents for routine follow-up.     Plan:    Orders:  - Repeat skin check in 1 week. Continue skin checks as needed.  - Routine post treatment follow-up at 3 months.    We reviewed the patient's posttreatment healing.  She has persistent erythematous and radiation dermatitis findings over the right breast.  We discussed this is expected at this stage of treatment.  We discussed anticipatory guidance over the coming days and weeks.  She will return for skin check in 1 week with my nurse, Evette.  We discussed appropriate skin care at this stage.  She is encouraged reach out with questions or concerns prior to her next appointment.    We will plan for routine follow-up in 3 months.        I spent 20 minutes caring for Jeannie on this date of service. This time includes time spent by me in the following activities:preparing for the visit, reviewing tests, obtaining and/or reviewing a separately obtained history, performing a medically appropriate examination and/or evaluation , counseling and educating the patient/family/caregiver, documenting information in the medical record, and independently interpreting results and communicating that information with the patient/family/caregiver  Follow Up   No follow-ups on file.  Patient was given instructions and counseling regarding her condition or for health maintenance advice. Please see specific information pulled into the AVS if appropriate.     Robert Lovelace MD

## 2025-03-31 NOTE — PROGRESS NOTES
[facility] RADIATION ONCOLOGY  NURSE VISIT NOTE    Name: Jeannie Ruiz  YOB: 1950  MRN #: [id number]  Date of Service: 3/31/2025  Diagnosis:    Encounter Diagnosis   Name Primary?    Malignant neoplasm of upper-outer quadrant of right breast in female, estrogen receptor positive Yes        Reason for Visit:  Skin healing assessment after radiation therapy completion      Radiation Treatment Course       Treating Physician: Robert Lovelace MD      Start: 03/03/2025     End: 03/24/2025   Site: Rt Breast/Ax/SC    Total Dose: 4250 cGy    Dose/Fraction: 270 cGy    Total Fractions: 16 Daily or BID: Daily  Modality: Photon  Technique: IMRT/VMAT/Rapid Arc  Bolus: No       Assessment     ASSESSMENT:     Jeannie Ruiz is a 75 y.o. female who was diagnosed with Stage IB (cT1c, cN1(f), cM0, G1, ER+, MN+, HER2-) invasive ductal carcinoma of the right breast in October 2024. She underwent right lumpectomy with Dr. Gabriela Roper at [facility] on 12/19/2024. She completed adjuvant radiation therapy with 4250 cGy in 16 fractions on 3/24/2025. She returns to our clinic today for interval skin assessment after completion of radiation therapy.     Treated skin of the right breast continues to show mild to moderate radiation dermatitis and mild folliculitis to superior chest (V-neck area).       PLAN:      - Continue hydrocortisone cream to treated breast until 14 days after radiation therapy completion date (4/7/2025).  - Continue Eucerin or other unscented lotion of choice to treated breast at least daily until 3-month follow-up appointment with Dr. Lovelace.  -   - Reach out with any concerns in the interim. Direct number provided.      Follow-Up     Patient is scheduled for 3-month follow-up after completion of radiation therapy on 7/2/2025 with Dr. Lovelace.    ___________________________________________________    Patient's visit today was included as part of her radiation therapy treatment plan and  is not a substitute for an on-visit treatment visit with a physician.    Evette Acuña RN  3/31/2025  1:38 PM EDT

## 2025-04-01 ENCOUNTER — CLINICAL SUPPORT (OUTPATIENT)
Dept: RADIATION ONCOLOGY | Facility: HOSPITAL | Age: 75
End: 2025-04-01
Payer: MEDICARE

## 2025-04-01 DIAGNOSIS — C50.411 MALIGNANT NEOPLASM OF UPPER-OUTER QUADRANT OF RIGHT BREAST IN FEMALE, ESTROGEN RECEPTOR POSITIVE: Primary | ICD-10-CM

## 2025-04-01 DIAGNOSIS — Z17.0 MALIGNANT NEOPLASM OF UPPER-OUTER QUADRANT OF RIGHT BREAST IN FEMALE, ESTROGEN RECEPTOR POSITIVE: Primary | ICD-10-CM

## 2025-04-02 DIAGNOSIS — L58.0 ACUTE RADIATION-INDUCED DERMATITIS: Primary | ICD-10-CM

## 2025-04-02 RX ORDER — SILVER SULFADIAZINE 10 MG/G
CREAM TOPICAL
Qty: 50 G | Refills: 1 | Status: SHIPPED | OUTPATIENT
Start: 2025-04-02

## 2025-04-04 NOTE — PROGRESS NOTES
Encounter Date:2025  Last seen 2024      Patient ID: Jeannie Ruiz is a 75 y.o. female.    Chief Complaint:  Status post CABG  Dyslipidemia  Postoperative atrial fibrillation    History of Present Illness  Patient was diagnosed to have right breast carcinoma.  Status postlumpectomy radiation therapy.    Since I have last seen, the patient has been without any chest discomfort ,shortness of breath, palpitations, dizziness or syncope.  Denies having any headache ,abdominal pain ,nausea, vomiting , diarrhea constipation, loss of weight or loss of appetite.  Denies having any excessive bruising ,hematuria or blood in the stool.    Review of all systems negative except as indicated.    Reviewed ROS.    Assessment and Plan      ]]]]]]]]]]]]]]]]]]]]]]]  History  ===========  -Status post CABG with LIMA to dual LAD, SVG to PDA and SVG to high marginal-Dr. Hall-2024.     - Preoperative non-STEMI     - Postoperative transient atrial fibrillation     - Dyslipidemia hypertension borderline diabetes.  CKD 3     - Exogenous obesity (BMI 39)     - Status post cholecystectomy.  Status post  section x 3.    - Right breast carcinoma.  Status postlumpectomy radiation therapy.    - Non-smoker     - Intolerance to statins although patient is able to take lovastatin.  ============  Plan  ============  Patient was diagnosed to have right breast carcinoma.  Status postlumpectomy radiation therapy.    Status post CABG 2024  Patient is not having any angina pectoris or congestive heart failure.    Preoperative non-STEMI.  Patient is off Plavix now.    Postoperative transient atrial fibrillation  Patient is maintaining sinus rhythm.  Patient is off amiodarone.    Hypertension-149/83.  Maintain blood pressure log.    Dyslipidemia-on Repatha    Medications were reviewed and updated.  Patient is off amiodarone and Plavix.  Aspirin lovastatin 20 mg p.o. nightly Metroprolol tartrate 25 mg twice daily Lasix 40 mg  twice daily potassium 20 mEq daily.     Follow-up in the office in 6 months.    Further plan will depend on patient's progress.    Reviewed and updated 4/7/2025.  ]]]]]]]]]]]]]]]]]]]]]]]]]                   Diagnosis Plan   1. Hx of CABG        2. Dyslipidemia        3. Essential hypertension        LAB RESULTS (LAST 7 DAYS)    CBC        BMP        CMP         BNP        TROPONIN        CoAg        Creatinine Clearance  CrCl cannot be calculated (Patient's most recent lab result is older than the maximum 30 days allowed.).    ABG        Radiology  No radiology results for the last day                The following portions of the patient's history were reviewed and updated as appropriate: allergies, current medications, past family history, past medical history, past social history, past surgical history, and problem list.    Review of Systems   Constitutional: Negative for malaise/fatigue.   Cardiovascular:  Positive for chest pain. Negative for dyspnea on exertion, leg swelling and palpitations.   Respiratory:  Negative for cough and shortness of breath.    Gastrointestinal:  Negative for abdominal pain, nausea and vomiting.   Neurological:  Negative for dizziness, focal weakness, headaches, light-headedness and numbness.   All other systems reviewed and are negative.      Current Outpatient Medications:     Accu-Chek Softclix Lancets lancets, 1 each by Other route Daily. Use as instructed Dx code: E11.9, Disp: 100 each, Rfl: 2    aluminum-magnesium hydroxide-simethicone (MAALOX) 200-200-20 MG/5ML suspension, Mix 1 teaspoon (5 mL) viscous Lidocaine 2% with 1 teaspoon (5 mL) Maalox and swallow mixture prior to all meals and at bedtime (four times daily)., Disp: 355 mL, Rfl: 0    anastrozole (ARIMIDEX) 1 MG tablet, Take 1 tablet by mouth Daily., Disp: 90 tablet, Rfl: 5    aspirin 81 MG EC tablet, Take 1 tablet by mouth Daily., Disp: , Rfl:     Blood Glucose Monitoring Suppl (Accu-Chek Guide Me) w/Device kit, Use 1  kit Daily. Dx code: E11.9  NDC [address]-34  Bin#: [id number] Group #: [id number]  ID #: [id number]  Issuer #: [id number]), Disp: 1 kit, Rfl: 0    cholecalciferol (Vitamin D, Cholecalciferol,) 25 MCG (1000 UT) tablet, Take 1 tablet by mouth Daily., Disp: , Rfl:     coenzyme Q10 100 MG capsule, Take 1 capsule by mouth Daily., Disp: , Rfl:     Evolocumab (Repatha) solution prefilled syringe injection, Inject 1 mL under the skin into the appropriate area as directed., Disp: , Rfl:     Evolocumab (Repatha) solution prefilled syringe injection, Inject 140 mg under the skin every 14 (fourteen) days., Disp: 6 mL, Rfl: 3    Flaxseed, Linseed, (Flax Seed Oil) 1000 MG capsule, Take 1 capsule by mouth Daily., Disp: , Rfl:     fluconazole (DIFLUCAN) 150 MG tablet, Take 1 tablet (150 mg total) by mouth 1 (one) time for 1 dose., Disp: 1 tablet, Rfl: 0    furosemide (Lasix) 40 MG tablet, Take 1 tablet by mouth 2 (Two) Times a Day., Disp: 180 tablet, Rfl: 3    glucose blood (Accu-Chek Guide) test strip, 1 each by Other route Daily. Use as instructed Dx code: E11.9, Disp: 100 each, Rfl: 2    Lidocaine Viscous HCl (XYLOCAINE) 2 % solution, Mix 1 teaspoon (5 mL) viscous Lidocaine 2% with 1 teaspoon (5 mL) Maalox and swallow mixture prior to all meals and at bedtime (four times daily)., Disp: 100 mL, Rfl: 2    loperamide (Anti-Diarrheal) 2 MG capsule, Take 1 capsule by mouth 2 (Two) Times a Day with each dose of abemaciclib, Disp: 60 capsule, Rfl: 1    lovastatin (MEVACOR) 20 MG tablet, Take 1 tablet by mouth Daily., Disp: , Rfl:     metoprolol tartrate (LOPRESSOR) 25 MG tablet, Take 1 tablet by mouth Every 12 (Twelve) Hours., Disp: 180 tablet, Rfl: 3    omega-3 acid ethyl esters (LOVAZA) 1 g capsule, Take 1 capsule by mouth., Disp: , Rfl:     ondansetron (ZOFRAN) 4 MG tablet, Take 1 tablet by mouth Every 8 (Eight) Hours As Needed for Nausea or Vomiting., Disp: 90 tablet, Rfl: 3    potassium chloride (KLOR-CON M20) 20 MEQ CR tablet, Take 1  tablet by mouth 2 (Two) Times a Day., Disp: 180 tablet, Rfl: 3    silver sulfadiazine (SILVADENE, SSD) 1 % cream, Apply cream under right breast 2-3 times daily until skin has healed., Disp: 50 g, Rfl: 1    sulfamethoxazole-trimethoprim (BACTRIM DS,SEPTRA DS) 800-160 MG per tablet, Take 1 tablet by mouth 2 (two) times a day for 10 days., Disp: 20 tablet, Rfl: 0    traMADol (ULTRAM) 50 MG tablet, , Disp: , Rfl:     VITAMIN E 400 UNIT capsule, Take 1 capsule by mouth Daily., Disp: , Rfl:     Allergies   Allergen Reactions    Statins Myalgia       Family History   Problem Relation Age of Onset    Diabetes Mother     Heart disease Mother     Diabetes Father     Heart disease Father     Stroke Father        Past Surgical History:   Procedure Laterality Date    APPENDECTOMY      BREAST SURGERY      COLONOSCOPY      CORONARY ARTERY BYPASS GRAFT N/A 2024    Procedure: CORONARY ARTERY BYPASS WITH INTERNAL MAMMARY ARTERY GRAFT with intraop CHARLEE;  Surgeon: Bienvenido Hall MD;  Location: [facility];  Service: Cardiothoracic;  Laterality: N/A;  CABG x3,  2 vein grafts and 1 LIMA    SKIN BIOPSY      US GUIDED LYMPH NODE BIOPSY  10/29/2024       Past Medical History:   Diagnosis Date    Coronary artery disease     Diabetes mellitus     Elevated cholesterol     Hematuria     chronic microscopic    Hypertension        Family History   Problem Relation Age of Onset    Diabetes Mother     Heart disease Mother     Diabetes Father     Heart disease Father     Stroke Father        Social History     Socioeconomic History    Marital status:    Tobacco Use    Smoking status: Former     Current packs/day: 0.00     Average packs/day: 1.5 packs/day for 14.0 years (21.0 ttl pk-yrs)     Types: Cigarettes     Start date:      Quit date:      Years since quittin.2     Passive exposure: Past    Smokeless tobacco: Never   Vaping Use    Vaping status: Never Used   Substance and Sexual Activity    Alcohol use: Not Currently     Drug use: Never    Sexual activity: Defer           ECG 12 Lead    Date/Time: 4/7/2025 4:23 PM  Performed by: Jayjay Wright MD    Authorized by: Jayjay Wright MD  Comparison: compared with previous ECG   Similar to previous ECG  Comparison to previous ECG: Normal sinus rhythm nonspecific ST-T wave changes left anterior fascicular block 65/min no ectopy no significant change from previous EKG.        Objective:       Physical Exam    There were no vitals taken for this visit.  The patient is alert, oriented and in no distress.    Vital signs as noted above.     Head and neck revealed no carotid bruits or jugular venous distension.  No thyromegaly or lymphadenopathy is present.     Lungs clear.  No wheezing.  Breath sounds are normal bilaterally.     Heart normal first and second heart sounds.  No murmur..  No pericardial rub is present.  No gallop is present.     Abdomen soft and nontender.  No organomegaly is present.     Extremities revealed good peripheral pulses without any pedal edema.     Skin warm and dry.     Musculoskeletal system is grossly normal.     CNS grossly normal.    Reviewed and updated 4/7/2025.

## 2025-04-07 ENCOUNTER — OFFICE VISIT (OUTPATIENT)
Dept: CARDIOLOGY | Facility: CLINIC | Age: 75
End: 2025-04-07
Payer: MEDICARE

## 2025-04-07 VITALS
SYSTOLIC BLOOD PRESSURE: 149 MMHG | DIASTOLIC BLOOD PRESSURE: 83 MMHG | BODY MASS INDEX: 37.39 KG/M2 | OXYGEN SATURATION: 96 % | WEIGHT: 219 LBS | HEART RATE: 67 BPM | HEIGHT: 64 IN

## 2025-04-07 DIAGNOSIS — I10 ESSENTIAL HYPERTENSION: ICD-10-CM

## 2025-04-07 DIAGNOSIS — E78.5 DYSLIPIDEMIA: ICD-10-CM

## 2025-04-07 DIAGNOSIS — Z95.1 HX OF CABG: Primary | ICD-10-CM

## 2025-04-07 PROCEDURE — 3077F SYST BP >= 140 MM HG: CPT | Performed by: INTERNAL MEDICINE

## 2025-04-07 PROCEDURE — 3079F DIAST BP 80-89 MM HG: CPT | Performed by: INTERNAL MEDICINE

## 2025-04-07 PROCEDURE — 93000 ELECTROCARDIOGRAM COMPLETE: CPT | Performed by: INTERNAL MEDICINE

## 2025-04-07 PROCEDURE — 1159F MED LIST DOCD IN RCRD: CPT | Performed by: INTERNAL MEDICINE

## 2025-04-07 PROCEDURE — 1160F RVW MEDS BY RX/DR IN RCRD: CPT | Performed by: INTERNAL MEDICINE

## 2025-04-07 PROCEDURE — 99214 OFFICE O/P EST MOD 30 MIN: CPT | Performed by: INTERNAL MEDICINE

## 2025-04-07 RX ORDER — BENZONATATE 100 MG/1
100 CAPSULE ORAL
COMMUNITY
Start: 2025-04-02 | End: 2025-05-02

## 2025-04-08 ENCOUNTER — OFFICE VISIT (OUTPATIENT)
Dept: RADIATION ONCOLOGY | Facility: HOSPITAL | Age: 75
End: 2025-04-08
Payer: MEDICARE

## 2025-04-08 DIAGNOSIS — R91.8 PULMONARY NODULES: Primary | ICD-10-CM

## 2025-04-08 DIAGNOSIS — Z17.0 MALIGNANT NEOPLASM OF UPPER-OUTER QUADRANT OF RIGHT BREAST IN FEMALE, ESTROGEN RECEPTOR POSITIVE: ICD-10-CM

## 2025-04-08 DIAGNOSIS — C50.411 MALIGNANT NEOPLASM OF UPPER-OUTER QUADRANT OF RIGHT BREAST IN FEMALE, ESTROGEN RECEPTOR POSITIVE: ICD-10-CM

## 2025-04-08 RX ORDER — METHYLPREDNISOLONE 4 MG/1
TABLET ORAL
Qty: 21 TABLET | Refills: 0 | Status: SHIPPED | OUTPATIENT
Start: 2025-04-08

## 2025-04-08 NOTE — PROGRESS NOTES
[facility] Radiation Oncology   Follow Up    Chief Complaint  Skin toxicity check after completing radiation      Diagnosis:      Diagnosis Plan   1. Pulmonary nodules  CT Chest Without Contrast Diagnostic              Interval History:    Jeannie Ruiz presents for a 2 week post treatment toxicity check after completing right breast and regional zenaida radiation therapy on 3/24/2025.     The patient reports significant improvement in her erythema over the medial chest wall.  She still has healing moist desquamation under the right breast.  She has no recent drainage and there is fresh skin underneath the right breast.  This area  and sensitive.    The patient reports symptoms of chills have been present over the past week or so.  She has not detected a fever during this time.  She has a mild cough.      Imaging:      Interpretation of Outside CT Abdomen and or Pelvis  Result Date: 3/3/2025  No evidence of abdominopelvic metastatic disease.    Interpretation of Outside CT Chest  Result Date: 2/28/2025  1. Postoperative changes interval right breast lumpectomy and axilla lymph node dissection. The new 47 x 26 x 21 mm intermediate density collection in the right breast at the operative bed likely represents postoperative change/resolving hematoma. 2. Multiple new scattered tiny solid pulmonary nodules measuring up to 4 mm are indeterminate for metastases. 3. Stable findings of prior granulomatous infection. CT follow-up suggested in 3 months. 4. Please refer to the separate CT abdomen/pelvis report for abdominal findings.    Interpretation of Outside NM General  Result Date: 2/27/2025  No abnormal radiotracer activity to indicate osseous metastatic disease.    CT Lower Extremity Right With Contrast  Result Date: 2/10/2025  Impression: 1.Density in the subcutaneous tissues at the medial right thigh is less conspicuous than on the prior PET/CT and has a linear morphology and no definite nodular or masslike  components at this time. This may again be related to scarring from prior vein graft harvest site. Correlate with surgical history. 2.No other suspicious soft tissue abnormality of the right thigh at this time. 3.Mild arthropathy of the hip and knee. Electronically Signed: Gt Lane  2/10/2025 11:54 AM EST  Workstation ID: [id number]          Pathology:      No new Pathology to review      Labs:    Lab Results   Component Value Date    CREATININE 1.30 02/06/2025                 Problem List:  Patient Active Problem List   Diagnosis    CAD (coronary artery disease)    Non-STEMI (non-ST elevated myocardial infarction)    HTN (hypertension)    HLD (hyperlipidemia)    Type 2 diabetes mellitus, without long-term current use of insulin    S/P CABG x 3 with HUFFMAN per Dr. Hall 6/6/2024    Postoperative atrial fibrillation    Erythema of wound    Asymptomatic postmenopausal state    Breast cancer screening    Cervical dysplasia    Colon polyposis    Daytime somnolence    Elevated liver enzymes    Fibroadenosis of breast    Hematuria    Nocturnal hypoxemia    Osteoarthritis    Peripheral edema    Recurrent low back pain    Snoring    Stage 3 chronic kidney disease    Transaminitis    Malignant neoplasm of upper-outer quadrant of right breast in female, estrogen receptor positive          Medications:  Current Outpatient Medications on File Prior to Visit   Medication Sig Dispense Refill    Accu-Chek Softclix Lancets lancets 1 each by Other route Daily. Use as instructed Dx code: E11.9 100 each 2    aluminum-magnesium hydroxide-simethicone (MAALOX) 200-200-20 MG/5ML suspension Mix 1 teaspoon (5 mL) viscous Lidocaine 2% with 1 teaspoon (5 mL) Maalox and swallow mixture prior to all meals and at bedtime (four times daily). 355 mL 0    anastrozole (ARIMIDEX) 1 MG tablet Take 1 tablet by mouth Daily. 90 tablet 5    aspirin 81 MG EC tablet Take 1 tablet by mouth Daily.      benzonatate (TESSALON) 100 MG capsule Take 1 capsule by  mouth.      Blood Glucose Monitoring Suppl (Accu-Chek Guide Me) w/Device kit Use 1 kit Daily. Dx code: E11.9  NDC [address]-41  Bin#: [id number] Group #: [id number]  ID #: [id number]  Issuer #: [id number]) 1 kit 0    cholecalciferol (Vitamin D, Cholecalciferol,) 25 MCG (1000 UT) tablet Take 1 tablet by mouth Daily.      coenzyme Q10 100 MG capsule Take 1 capsule by mouth Daily.      Evolocumab (Repatha) solution prefilled syringe injection Inject 1 mL under the skin into the appropriate area as directed.      Evolocumab (Repatha) solution prefilled syringe injection Inject 140 mg under the skin every 14 (fourteen) days. 6 mL 3    Flaxseed, Linseed, (Flax Seed Oil) 1000 MG capsule Take 1 capsule by mouth Daily. (Patient not taking: Reported on 4/7/2025)      fluconazole (DIFLUCAN) 150 MG tablet Take 1 tablet (150 mg total) by mouth 1 (one) time for 1 dose. 1 tablet 0    furosemide (Lasix) 40 MG tablet Take 1 tablet by mouth 2 (Two) Times a Day. 180 tablet 3    glucose blood (Accu-Chek Guide) test strip 1 each by Other route Daily. Use as instructed Dx code: E11.9 100 each 2    Lidocaine Viscous HCl (XYLOCAINE) 2 % solution Mix 1 teaspoon (5 mL) viscous Lidocaine 2% with 1 teaspoon (5 mL) Maalox and swallow mixture prior to all meals and at bedtime (four times daily). 100 mL 2    loperamide (Anti-Diarrheal) 2 MG capsule Take 1 capsule by mouth 2 (Two) Times a Day with each dose of abemaciclib 60 capsule 1    lovastatin (MEVACOR) 20 MG tablet Take 1 tablet by mouth Daily.      metoprolol tartrate (LOPRESSOR) 25 MG tablet Take 1 tablet by mouth Every 12 (Twelve) Hours. 180 tablet 3    omega-3 acid ethyl esters (LOVAZA) 1 g capsule Take 1 capsule by mouth.      ondansetron (ZOFRAN) 4 MG tablet Take 1 tablet by mouth Every 8 (Eight) Hours As Needed for Nausea or Vomiting. 90 tablet 3    potassium chloride (KLOR-CON M20) 20 MEQ CR tablet Take 1 tablet by mouth 2 (Two) Times a Day. 180 tablet 3    silver sulfadiazine (SILVADENE, SSD)  "1 % cream Apply cream under right breast 2-3 times daily until skin has healed. 50 g 1    sulfamethoxazole-trimethoprim (BACTRIM DS,SEPTRA DS) 800-160 MG per tablet Take 1 tablet by mouth 2 (two) times a day for 10 days. 20 tablet 0    traMADol (ULTRAM) 50 MG tablet       VITAMIN E 400 UNIT capsule Take 1 capsule by mouth Daily.       No current facility-administered medications on file prior to visit.          Allergies:  Allergies   Allergen Reactions    Ezetimibe Other (See Comments)     Patient states she does not tolerate statins well. Could not specify the reaction. 6.4.24    Fenofibrate Other (See Comments)    Nifedipine Other (See Comments)     Years ago, does not remember.       Years ago, does not remember.    Statins Myalgia           Vital Signs:  There were no vitals taken for this visit.  Estimated body mass index is 37.59 kg/m² as calculated from the following:    Height as of 4/7/25: 162.6 cm (64\").    Weight as of 4/7/25: 99.3 kg (219 lb).  There were no vitals filed for this visit.        ECOG: Restricted in physically strenuous activity but ambulatory and able to carry out work of a light or sedentary nature, e.g., light house work, office work = 1    Physical Exam  Exam conducted with a chaperone present.   Constitutional:       General: She is not in acute distress.  HENT:      Head: Normocephalic and atraumatic.   Cardiovascular:      Rate and Rhythm: Normal rate and regular rhythm.   Pulmonary:      Effort: Pulmonary effort is normal. No respiratory distress.      Breath sounds: No stridor. No wheezing or rhonchi.   Chest:      Comments: Significant improvement in radiation dermatitis over the right breast.  Desquamation and skin peeling present underneath the right breast.  This area is tender.  No evidence of recent discharge or signs of infection.  Neurological:      Mental Status: She is alert and oriented to person, place, and time. Mental status is at baseline.   Psychiatric:         " Mood and Affect: Mood normal.         Behavior: Behavior normal.            Result Review :             Diagnoses and all orders for this visit:    1. Pulmonary nodules (Primary)  -     CT Chest Without Contrast Diagnostic; Future    Other orders  -     methylPREDNISolone (MEDROL) 4 MG dose pack; Take as directed on package instructions.  Dispense: 21 tablet; Refill: 0          Assessment:    Jeannie Ruiz is a 75 y.o. female with a history of NSTEMI, multivessel coronary artery disease and coronary artery bypass with a left internal mammary artery graft on 6/7/2024 who presents with a Stage IB (pT2, pN1a, cM0, G2, ER+, MI+, HER2-, Oncotype DX score: 25) right breast invasive ductal carcinoma. She underwent lumpectomy and SLNB followed by axillary node dissection on 12/19/2024.  She completed adjuvant right breast and regional zenaida radiation on 3/24/2025, 4250 cGy over 16 fractions. She presents for routine follow-up.     Plan:    Orders:  - Repeat skin check in 2 weeks.   - prescription sent for Medrol Dosepak to see if this helps her mild cough and additional symptoms  - CT chest ordered for late June prior to her 3-month follow-up appointment to monitor for pulmonary nodules    The patient's skin is trending in the right direction.  She still has some areas of desquamation underneath the right breast.  Her areas of erythema are significantly improved.  The patient has new symptoms of chills without fever.  She also has a mild cough.  We discussed trialing a Medrol Dosepak to see if this helps her symptoms.  We will follow-up with the patient in any additional 2 weeks to monitor her skin healing and her additional symptoms.  She is encouraged to reach out with questions or concerns if they arise prior to her next appointment.       I spent 20 minutes caring for Jeannie on this date of service. This time includes time spent by me in the following activities:preparing for the visit, reviewing tests, obtaining  and/or reviewing a separately obtained history, performing a medically appropriate examination and/or evaluation , counseling and educating the patient/family/caregiver, documenting information in the medical record, and independently interpreting results and communicating that information with the patient/family/caregiver  Follow Up   No follow-ups on file.  Patient was given instructions and counseling regarding her condition or for health maintenance advice. Please see specific information pulled into the AVS if appropriate.     Robert Lovelace MD

## 2025-04-08 NOTE — PROGRESS NOTES
[facility] RADIATION ONCOLOGY  FOLLOW-UP    Name: Jeannie Ruiz  YOB: 1950  MRN #: [id number]  Date of Service: 4/8/2025    Chief Complaint:  ***    Diagnosis:   No diagnosis found.       Radiation Treatment Course       Treating Physician: ***     Start: ***     End: ***   Site: ***    Total Dose: *** cGy    Dose/Fraction: *** cGy    Total Fractions: *** Daily or BID: {Radiation Fraction Frequency:98993}  Modality: {Radiation Modality:79548}  Technique: {Radiation Technique:94230}  Bolus: {Yes, describe inverted:31749}       Treating Physician: ***     Start: ***     End: ***   Site: ***    Total Dose: *** cGy    Dose/Fraction: *** cGy    Total Fractions: *** Daily or BID: {Radiation Fraction Frequency:94099}  Modality: {Radiation Modality:15507}  Technique: {Radiation Technique:88987}  Bolus: {Yes, describe inverted:85955}     Start: ***     End: ***   Site: ***    Total Dose: *** cGy    Dose/Fraction: *** cGy    Total Fractions: *** Daily or BID: {Radiation Fraction Frequency:63525}  Modality: {Radiation Modality:70099}  Technique: {Radiation Technique:19642}  Bolus: {Yes, describe inverted:01933}         Interval History       Jeannie Ruiz is a 75 y.o. female ***        Imaging       Interpretation of Outside CT Abdomen and or Pelvis  Result Date: 3/3/2025  No evidence of abdominopelvic metastatic disease.    Interpretation of Outside CT Chest  Result Date: 2/28/2025  1. Postoperative changes interval right breast lumpectomy and axilla lymph node dissection. The new 47 x 26 x 21 mm intermediate density collection in the right breast at the operative bed likely represents postoperative change/resolving hematoma. 2. Multiple new scattered tiny solid pulmonary nodules measuring up to 4 mm are indeterminate for metastases. 3. Stable findings of prior granulomatous infection. CT follow-up suggested in 3 months. 4. Please refer to the separate CT abdomen/pelvis report for abdominal  findings.    Interpretation of Outside NM General  Result Date: 2/27/2025  No abnormal radiotracer activity to indicate osseous metastatic disease.    CT Lower Extremity Right With Contrast  Result Date: 2/10/2025  Impression: 1.Density in the subcutaneous tissues at the medial right thigh is less conspicuous than on the prior PET/CT and has a linear morphology and no definite nodular or masslike components at this time. This may again be related to scarring from prior vein graft harvest site. Correlate with surgical history. 2.No other suspicious soft tissue abnormality of the right thigh at this time. 3.Mild arthropathy of the hip and knee. Electronically Signed: Gt Sherman  2/10/2025 11:54 AM EST  Workstation ID: [id number]         Pathology       No new pathology to review.    Tissue Pathology Exam: ***  Date: ***         Labs       Hematology:  WBC   Date Value Ref Range Status   02/28/2025 4.9 3.4 - 9.6 x10(9)/L Final     RBC   Date Value Ref Range Status   02/28/2025 4.51 3.92 - 5.13 x10(12)/L  x10(12)/L Final     Hemoglobin   Date Value Ref Range Status   02/28/2025 13.5 11.6 - 15.0 g/dL Final     Hematocrit   Date Value Ref Range Status   02/28/2025 42.7 35.5 - 44.9 % Final     Platelets   Date Value Ref Range Status   02/28/2025 228 157 - 371 x10(9)/L Final     Chemistry:  Lab Results   Component Value Date    GLUCOSE 92 07/23/2024    BUN 22 07/23/2024    CREATININE 1.30 02/06/2025    BCR 19.5 07/23/2024    K 3.9 07/23/2024    CO2 25.0 07/23/2024    CALCIUM 9.4 07/23/2024    ALBUMIN 4.3 07/23/2024    AST 25 07/23/2024    ALT 18 07/23/2024         Problem List       Patient Active Problem List   Diagnosis    CAD (coronary artery disease)    Non-STEMI (non-ST elevated myocardial infarction)    HTN (hypertension)    HLD (hyperlipidemia)    Type 2 diabetes mellitus, without long-term current use of insulin    S/P CABG x 3 with HUFFMAN per Dr. Hall 6/6/2024    Postoperative atrial fibrillation    Erythema of  wound    Asymptomatic postmenopausal state    Breast cancer screening    Cervical dysplasia    Colon polyposis    Daytime somnolence    Elevated liver enzymes    Fibroadenosis of breast    Hematuria    Nocturnal hypoxemia    Osteoarthritis    Peripheral edema    Recurrent low back pain    Snoring    Stage 3 chronic kidney disease    Transaminitis    Malignant neoplasm of upper-outer quadrant of right breast in female, estrogen receptor positive          Current Medications       Current Outpatient Medications   Medication Instructions    Accu-Chek Softclix Lancets lancets 1 each, Other, Daily, Use as instructed Dx code: E11.9    aluminum-magnesium hydroxide-simethicone (MAALOX) 200-200-20 MG/5ML suspension Mix 1 teaspoon (5 mL) viscous Lidocaine 2% with 1 teaspoon (5 mL) Maalox and swallow mixture prior to all meals and at bedtime (four times daily).    anastrozole (ARIMIDEX) 1 mg, Oral, Daily    aspirin 81 mg, Daily    benzonatate (TESSALON) 100 mg    Blood Glucose Monitoring Suppl (Accu-Chek Guide Me) w/Device kit 1 kit, Not Applicable, Daily, Dx code: E11.9  NDC [address]-01  Bin#: [id number] Group #: [id number]  ID #: [id number]  Issuer #: ([id number])    cholecalciferol (VITAMIN D3) 1,000 Units, Daily    coenzyme Q10 100 MG capsule 1 capsule, Daily    Evolocumab (Repatha) solution prefilled syringe injection Inject 140 mg under the skin every 14 (fourteen) days.    Flaxseed, Linseed, (Flax Seed Oil) 1000 MG capsule 1 capsule, Daily    fluconazole (DIFLUCAN) 150 MG tablet Take 1 tablet (150 mg total) by mouth 1 (one) time for 1 dose.    furosemide (LASIX) 40 mg, Oral, 2 Times Daily    glucose blood (Accu-Chek Guide) test strip 1 each, Other, Daily, Use as instructed Dx code: E11.9    Lidocaine Viscous HCl (XYLOCAINE) 2 % solution Mix 1 teaspoon (5 mL) viscous Lidocaine 2% with 1 teaspoon (5 mL) Maalox and swallow mixture prior to all meals and at bedtime (four times daily).    loperamide (Anti-Diarrheal) 2 MG capsule Take  1 capsule by mouth 2 (Two) Times a Day with each dose of abemaciclib    lovastatin (MEVACOR) 20 mg, Daily    metoprolol tartrate (LOPRESSOR) 25 mg, Oral, Every 12 Hours Scheduled    omega-3 acid ethyl esters (LOVAZA) 1 g    ondansetron (ZOFRAN) 4 mg, Oral, Every 8 Hours PRN    potassium chloride (KLOR-CON M20) 20 MEQ CR tablet 20 mEq, Oral, 2 Times Daily    Repatha 140 mg    silver sulfadiazine (SILVADENE, SSD) 1 % cream Apply cream under right breast 2-3 times daily until skin has healed.    sulfamethoxazole-trimethoprim (BACTRIM DS,SEPTRA DS) 800-160 MG per tablet Take 1 tablet by mouth 2 (two) times a day for 10 days.    traMADol (ULTRAM) 50 MG tablet     vitamin E (VITAMIN E) 400 Units, Daily          Allergies       Allergies   Allergen Reactions    Ezetimibe Other (See Comments)     Patient states she does not tolerate statins well. Could not specify the reaction. 6.4.24    Fenofibrate Other (See Comments)    Nifedipine Other (See Comments)     Years ago, does not remember.       Years ago, does not remember.    Statins Myalgia           Review of Systems       Review of Systems   There were no vitals filed for this visit.   Physical Exam     {The following data was reviewed by (Optional):23297}  {Data reviewed (Optional):09110:::1}     Nutritional Status:    ECOG:  {ECO}  PHQ-2:  ***        Assessment and Plan       Assessment:          ***    There are no diagnoses linked to this encounter.   Plan:  ***    {Time Spent (Optional):60442}        Follow-Up       No follow-ups on file.       CC: Seymour Aldridge MD

## 2025-04-16 NOTE — PROGRESS NOTES
[facility] RADIATION ONCOLOGY  FOLLOW-UP    Name: Jeannie Ruiz  YOB: 1950  MRN #: [id number]  Date of Service: 4/17/2025    Chief Complaint:  One month follow up with skin check     Diagnosis:   Encounter Diagnosis   Name Primary?    Malignant neoplasm of upper-outer quadrant of right breast in female, estrogen receptor positive Yes          Interval History       Jeannie Ruiz is a 75 y.o. female  with a history of NSTEMI, multivessel coronary artery disease and coronary artery bypass with a left internal mammary artery graft on 6/7/2024 who presents with a Stage IB (pT2, pN1a, cM0, G2, ER+, [place]+, HER2-, Oncotype DX score: 25) right breast invasive ductal carcinoma. She underwent lumpectomy and SLNB followed by axillary node dissection on 12/19/2024. She completed adjuvant right breast and regional zenaida radiation on 3/24/2025, 4250 cGy over 16 fractions. She presents for routine follow-up and skin check.    The patient reports improvement in her skin. She still has some irritation and skin peeling under her breast. She has soreness and pain in her axilla and more centrally near her midline chest/prior surgical scars from her heart surgery. She feels more short of breath overall. She had a temporary benefit in breathing from the steroids but her symptoms returned a few days after steroids and seem to have gotten worse. She is also complaining of worsening hot flashes. The hot flashes can be unbearable and make it difficult to sleep. The hot flashes started after taking arimedex.         Imaging       No new imaging to review       Pathology       No new pathology to review.      Labs       Hematology:  WBC   Date Value Ref Range Status   02/28/2025 4.9 3.4 - 9.6 x10(9)/L Final     RBC   Date Value Ref Range Status   02/28/2025 4.51 3.92 - 5.13 x10(12)/L  x10(12)/L Final     Hemoglobin   Date Value Ref Range Status   02/28/2025 13.5 11.6 - 15.0 g/dL Final     Hematocrit   Date  Value Ref Range Status   02/28/2025 42.7 35.5 - 44.9 % Final     Platelets   Date Value Ref Range Status   02/28/2025 228 157 - 371 x10(9)/L Final     Chemistry:  Lab Results   Component Value Date    GLUCOSE 92 07/23/2024    BUN 22 07/23/2024    CREATININE 1.30 02/06/2025    BCR 19.5 07/23/2024    K 3.9 07/23/2024    CO2 25.0 07/23/2024    CALCIUM 9.4 07/23/2024    ALBUMIN 4.3 07/23/2024    AST 25 07/23/2024    ALT 18 07/23/2024         Problem List       Patient Active Problem List   Diagnosis    CAD (coronary artery disease)    Non-STEMI (non-ST elevated myocardial infarction)    HTN (hypertension)    HLD (hyperlipidemia)    Type 2 diabetes mellitus, without long-term current use of insulin    S/P CABG x 3 with HUFFMAN per Dr. Hall 6/6/2024    Postoperative atrial fibrillation    Erythema of wound    Asymptomatic postmenopausal state    Breast cancer screening    Cervical dysplasia    Colon polyposis    Daytime somnolence    Elevated liver enzymes    Fibroadenosis of breast    Hematuria    Nocturnal hypoxemia    Osteoarthritis    Peripheral edema    Recurrent low back pain    Snoring    Stage 3 chronic kidney disease    Transaminitis    Malignant neoplasm of upper-outer quadrant of right breast in female, estrogen receptor positive          Current Medications       Current Outpatient Medications   Medication Instructions    Accu-Chek Softclix Lancets lancets 1 each, Other, Daily, Use as instructed Dx code: E11.9    aluminum-magnesium hydroxide-simethicone (MAALOX) 200-200-20 MG/5ML suspension Mix 1 teaspoon (5 mL) viscous Lidocaine 2% with 1 teaspoon (5 mL) Maalox and swallow mixture prior to all meals and at bedtime (four times daily).    anastrozole (ARIMIDEX) 1 mg, Oral, Daily    aspirin 81 mg, Daily    benzonatate (TESSALON) 100 mg    Blood Glucose Monitoring Suppl (Accu-Chek Guide Me) w/Device kit 1 kit, Not Applicable, Daily, Dx code: E11.9  NDC [address]-02  Bin#: [id number] Group #: [id number]  ID #: [id number]   Issuer #: [id number])    cholecalciferol (VITAMIN D3) 1,000 Units, Daily    coenzyme Q10 100 MG capsule 1 capsule, Daily    Evolocumab (Repatha) solution prefilled syringe injection Inject 140 mg under the skin every 14 (fourteen) days.    Flaxseed, Linseed, (Flax Seed Oil) 1000 MG capsule 1 capsule, Daily    fluconazole (DIFLUCAN) 150 MG tablet Take 1 tablet (150 mg total) by mouth 1 (one) time for 1 dose.    furosemide (LASIX) 40 mg, Oral, 2 Times Daily    glucose blood (Accu-Chek Guide) test strip 1 each, Other, Daily, Use as instructed Dx code: E11.9    Lidocaine Viscous HCl (XYLOCAINE) 2 % solution Mix 1 teaspoon (5 mL) viscous Lidocaine 2% with 1 teaspoon (5 mL) Maalox and swallow mixture prior to all meals and at bedtime (four times daily).    loperamide (Anti-Diarrheal) 2 MG capsule Take 1 capsule by mouth 2 (Two) Times a Day with each dose of abemaciclib    lovastatin (MEVACOR) 20 mg, Daily    methylPREDNISolone (MEDROL) 4 MG dose pack Take as directed on package instructions.    metoprolol tartrate (LOPRESSOR) 25 mg, Oral, Every 12 Hours Scheduled    omega-3 acid ethyl esters (LOVAZA) 1 g    ondansetron (ZOFRAN) 4 mg, Oral, Every 8 Hours PRN    potassium chloride (KLOR-CON M20) 20 MEQ CR tablet 20 mEq, Oral, 2 Times Daily    Repatha 140 mg    silver sulfadiazine (SILVADENE, SSD) 1 % cream Apply cream under right breast 2-3 times daily until skin has healed.    sulfamethoxazole-trimethoprim (BACTRIM DS,SEPTRA DS) 800-160 MG per tablet Take 1 tablet by mouth 2 (two) times a day for 10 days.    traMADol (ULTRAM) 50 MG tablet     vitamin E (VITAMIN E) 400 Units, Daily          Allergies       Allergies   Allergen Reactions    Ezetimibe Other (See Comments)     Patient states she does not tolerate statins well. Could not specify the reaction. 6.4.24    Fenofibrate Other (See Comments)    Nifedipine Other (See Comments)     Years ago, does not remember.       Years ago, does not remember.    Statins Myalgia            Review of Systems         There were no vitals filed for this visit.   Physical Exam  Exam conducted with a chaperone present.   Constitutional:       General: She is not in acute distress.  HENT:      Head: Normocephalic and atraumatic.   Pulmonary:      Effort: Pulmonary effort is normal. No respiratory distress.   Chest:      Comments: Right breast radiation dermatitis is significantly improving. She has skin peeling present under the right breast but no obvious areas of moist desquamation. No obvious signs of local recurrence.   Neurological:      Mental Status: She is alert and oriented to person, place, and time. Mental status is at baseline.   Psychiatric:         Mood and Affect: Mood normal.         Behavior: Behavior normal.          ECOG:  Restricted in physically strenuous activity but ambulatory and able to carry out work of a light or sedentary nature, e.g., light house work, office work = 1          Assessment and Plan       Assessment:        Jeannie Ruiz is a 75 y.o. female  with a history of NSTEMI, multivessel coronary artery disease and coronary artery bypass with a left internal mammary artery graft on 6/7/2024 who presents with a Stage IB (pT2, pN1a, cM0, G2, ER+, SC+, HER2-, Oncotype DX score: 25) right breast invasive ductal carcinoma. She underwent lumpectomy and SLNB followed by axillary node dissection on 12/19/2024. She completed adjuvant right breast and regional zenaida radiation on 3/24/2025, 4250 cGy over 16 fractions. She presents for routine follow-up and skin check    Diagnoses and all orders for this visit:    1. Malignant neoplasm of upper-outer quadrant of right breast in female, estrogen receptor positive (Primary)       Plan:      Orders:  - Move up CT chest to evaluate for radiation pneumonitis given respiratory symptoms. Will consider additional steroids if there is imaging concerns for pneumonitis.   - Patient has cardiac work-up ordered by PCP due to new heart  murmur  - Discussed skin care regimen, patient can add vitamin E oil to regimen to help with skin recovery at this point  - Discussed options for pains and discomfort and hot flashes. Provided patient option of discussing her symptoms with Med Onc. Patient wanted to try something more urgently. Discussed different treatment options for hot flashes including gabapentin, venlafaxine, and citalopram amongst others. Patient chose citalopram due to daily dosing and few potential side effects. We started on 10 mg daily but can increase to 20 mg daily if she tolerates well. Also encouraged patient to discuss options with her Med Onc to optimize management of endocrine therapy and related side effects.       I spent 45 minutes caring for Jeannie on this date of service. This time includes time spent by me in the following activities:preparing for the visit, reviewing tests, obtaining and/or reviewing a separately obtained history, performing a medically appropriate examination and/or evaluation , counseling and educating the patient/family/caregiver, ordering medications, tests, or procedures, documenting information in the medical record, and independently interpreting results and communicating that information with the patient/family/caregiver        Follow-Up       No follow-ups on file.       CC: No ref. provider found

## 2025-04-21 ENCOUNTER — OFFICE VISIT (OUTPATIENT)
Dept: RADIATION ONCOLOGY | Facility: HOSPITAL | Age: 75
End: 2025-04-21
Payer: MEDICARE

## 2025-04-21 VITALS
BODY MASS INDEX: 37.42 KG/M2 | OXYGEN SATURATION: 94 % | HEART RATE: 78 BPM | DIASTOLIC BLOOD PRESSURE: 82 MMHG | SYSTOLIC BLOOD PRESSURE: 164 MMHG | WEIGHT: 218 LBS | RESPIRATION RATE: 18 BRPM

## 2025-04-21 DIAGNOSIS — Z17.0 MALIGNANT NEOPLASM OF UPPER-OUTER QUADRANT OF RIGHT BREAST IN FEMALE, ESTROGEN RECEPTOR POSITIVE: Primary | ICD-10-CM

## 2025-04-21 DIAGNOSIS — C50.411 MALIGNANT NEOPLASM OF UPPER-OUTER QUADRANT OF RIGHT BREAST IN FEMALE, ESTROGEN RECEPTOR POSITIVE: Primary | ICD-10-CM

## 2025-04-21 RX ORDER — CITALOPRAM HYDROBROMIDE 10 MG/1
10 TABLET ORAL DAILY
Qty: 30 TABLET | Refills: 6 | Status: SHIPPED | OUTPATIENT
Start: 2025-04-21

## 2025-04-21 RX ORDER — CITALOPRAM HYDROBROMIDE 10 MG/1
10 TABLET ORAL DAILY
Qty: 30 TABLET | Refills: 6 | Status: SHIPPED | OUTPATIENT
Start: 2025-04-21 | End: 2025-04-21

## 2025-04-23 ENCOUNTER — HOSPITAL ENCOUNTER (OUTPATIENT)
Dept: PET IMAGING | Facility: HOSPITAL | Age: 75
Discharge: HOME | End: 2025-04-23
Admitting: INTERNAL MEDICINE
Payer: MEDICARE

## 2025-04-23 DIAGNOSIS — R91.8 PULMONARY NODULES: ICD-10-CM

## 2025-04-23 PROCEDURE — 71250 CT THORAX DX C-: CPT

## 2025-05-01 ENCOUNTER — DOCUMENTATION (OUTPATIENT)
Dept: RADIATION ONCOLOGY | Facility: HOSPITAL | Age: 75
End: 2025-05-01
Payer: MEDICARE

## 2025-05-01 DIAGNOSIS — C50.411 MALIGNANT NEOPLASM OF UPPER-OUTER QUADRANT OF RIGHT BREAST IN FEMALE, ESTROGEN RECEPTOR POSITIVE: ICD-10-CM

## 2025-05-01 DIAGNOSIS — Z17.0 MALIGNANT NEOPLASM OF UPPER-OUTER QUADRANT OF RIGHT BREAST IN FEMALE, ESTROGEN RECEPTOR POSITIVE: ICD-10-CM

## 2025-05-01 DIAGNOSIS — R91.8 PULMONARY NODULES: Primary | ICD-10-CM

## 2025-05-01 NOTE — PROGRESS NOTES
I called and spoke to the patient about her CT findings. She has no evidence of radiation pneumonitis. She has no evidence of disease progression or recurrence. We discussed plans for continued work-up of her heart to evaluate for reasons of fatigue. We discussed plans to repeat a CT chest in 6 months to monitor her lungs.

## 2025-06-24 NOTE — PROGRESS NOTES
[facility] RADIATION ONCOLOGY  FOLLOW-UP    Name: Jeannie Ruiz  YOB: 1950  MRN #: [id number]  Date of Service: 6/29/2025    Chief Complaint:  3 month post treatment follow up with toxicity check    Diagnosis:   Encounter Diagnosis   Name Primary?    Malignant neoplasm of upper-outer quadrant of right breast in female, estrogen receptor positive Yes          Radiation Treatment Course       Treating Physician: Dr. Robert Lovelace     Start: 3/3/2025     End: 3/24/2025   Site: Rt Breast    Total Dose:4250 cGy    Dose/Fraction: 270cGy    Total Fractions: 16 Daily or BID: Daily  Modality: Photon  Technique: IMRT/VMAT/Rapid Arc  Bolus: No         Interval History       Jeannie Ruiz is a 75 y.o. female with a history of NSTEMI, multivessel coronary artery disease and coronary artery bypass with a left internal mammary artery graft on 6/7/2024 who presents with a Stage IB (pT2, pN1a, cM0, G2, ER+, [place]+, HER2-, Oncotype DX score: 25) right breast invasive ductal carcinoma. She underwent lumpectomy and SLNB followed by axillary node dissection on 12/19/2024. She completed adjuvant right breast and regional zenaida radiation on 3/24/2025, 4250 cGy over 16 fractions. She presents for routine follow-up and skin check.    After our last appointment, the patient was complaining of chest pains, cough, fatigue, and shortness of breath.     Patient underwent cardiac work-up and was found to have critical stenosis of the right coronary artery with an occluded vein graft. She underwent diffuse stenting to the right coronary artery on 5/22/2025.     The patient now reports her cough and tightness/pains in her chest are improved. She still feels tired and fatigued. She recently started cardiac rehab. She is on dual antiplatelet therapy and is noticing she is bruising more easily.        Imaging       No new imaging to review      Pathology       No new pathology to review.        Labs        Hematology:  WBC   Date Value Ref Range Status   02/28/2025 4.9 3.4 - 9.6 x10(9)/L Final     RBC   Date Value Ref Range Status   02/28/2025 4.51 3.92 - 5.13 x10(12)/L  x10(12)/L Final     Hemoglobin   Date Value Ref Range Status   02/28/2025 13.5 11.6 - 15.0 g/dL Final     Hematocrit   Date Value Ref Range Status   02/28/2025 42.7 35.5 - 44.9 % Final     Platelets   Date Value Ref Range Status   02/28/2025 228 157 - 371 x10(9)/L Final     Chemistry:  Lab Results   Component Value Date    GLUCOSE 92 07/23/2024    BUN 22 07/23/2024    CREATININE 1.30 02/06/2025    BCR 19.5 07/23/2024    K 3.9 07/23/2024    CO2 25.0 07/23/2024    CALCIUM 9.4 07/23/2024    ALBUMIN 4.3 07/23/2024    AST 25 07/23/2024    ALT 18 07/23/2024         Problem List       Patient Active Problem List   Diagnosis    CAD (coronary artery disease)    Non-STEMI (non-ST elevated myocardial infarction)    HTN (hypertension)    HLD (hyperlipidemia)    Type 2 diabetes mellitus, without long-term current use of insulin    S/P CABG x 3 with HUFFMAN per Dr. Hall 6/6/2024    Postoperative atrial fibrillation    Erythema of wound    Asymptomatic postmenopausal state    Breast cancer screening    Cervical dysplasia    Colon polyposis    Daytime somnolence    Elevated liver enzymes    Fibroadenosis of breast    Hematuria    Nocturnal hypoxemia    Osteoarthritis    Peripheral edema    Recurrent low back pain    Snoring    Stage 3 chronic kidney disease    Transaminitis    Malignant neoplasm of upper-outer quadrant of right breast in female, estrogen receptor positive          Current Medications       Current Outpatient Medications   Medication Instructions    Accu-Chek Softclix Lancets lancets 1 each, Other, Daily, Use as instructed Dx code: E11.9    aluminum-magnesium hydroxide-simethicone (MAALOX) 200-200-20 MG/5ML suspension Mix 1 teaspoon (5 mL) viscous Lidocaine 2% with 1 teaspoon (5 mL) Maalox and swallow mixture prior to all meals and at bedtime  (four times daily).    anastrozole (ARIMIDEX) 1 mg, Oral, Daily    aspirin 81 mg, Daily    Blood Glucose Monitoring Suppl (Accu-Chek Guide Me) w/Device kit 1 kit, Not Applicable, Daily, Dx code: E11.9  NDC [address]-00  Bin#: [id number] Group #: [id number]  ID #: [id number]  Issuer #: [id number])    cholecalciferol (VITAMIN D3) 1,000 Units, Daily    citalopram (CELEXA) 10 mg, Oral, Daily    coenzyme Q10 100 MG capsule 1 capsule, Daily    Evolocumab (Repatha) solution prefilled syringe injection Inject 140 mg under the skin every 14 (fourteen) days.    Flaxseed, Linseed, (Flax Seed Oil) 1000 MG capsule 1 capsule, Daily    fluconazole (DIFLUCAN) 150 MG tablet Take 1 tablet (150 mg total) by mouth 1 (one) time for 1 dose.    furosemide (LASIX) 40 mg, Oral, 2 Times Daily    glucose blood (Accu-Chek Guide) test strip 1 each, Other, Daily, Use as instructed Dx code: E11.9    Lidocaine Viscous HCl (XYLOCAINE) 2 % solution Mix 1 teaspoon (5 mL) viscous Lidocaine 2% with 1 teaspoon (5 mL) Maalox and swallow mixture prior to all meals and at bedtime (four times daily).    loperamide (Anti-Diarrheal) 2 MG capsule Take 1 capsule by mouth 2 (Two) Times a Day with each dose of abemaciclib    lovastatin (MEVACOR) 20 MG tablet Take 1 tablet (20 mg) by mouth in the morning.    lovastatin (MEVACOR) 20 mg, Daily    methylPREDNISolone (MEDROL) 4 MG dose pack Take as directed on package instructions.    metoprolol tartrate (LOPRESSOR) 50 MG tablet Take 1 tablet (50 mg) by mouth in the morning and at bedtime.    metoprolol tartrate (LOPRESSOR) 25 mg, Oral, Every 12 Hours Scheduled    omega-3 acid ethyl esters (LOVAZA) 1 g capsule Take 1 capsule (1 g) by mouth in the morning.    omega-3 acid ethyl esters (LOVAZA) 1 g    ondansetron (ZOFRAN) 4 mg, Oral, Every 8 Hours PRN    potassium chloride (KLOR-CON M20) 20 MEQ CR tablet 20 mEq, Oral, 2 Times Daily    Repatha 140 mg    silver sulfadiazine (SILVADENE, SSD) 1 % cream Apply cream under right breast  2-3 times daily until skin has healed.    sulfamethoxazole-trimethoprim (BACTRIM DS,SEPTRA DS) 800-160 MG per tablet Take 1 tablet by mouth 2 (two) times a day for 10 days.    ticagrelor (BRILINTA) 90 MG tablet tablet Take 1 tablet by mouth 2 (Two) Times a Day (morning and bedtime)    traMADol (ULTRAM) 50 MG tablet     vitamin E (VITAMIN E) 400 Units, Daily          Allergies       Allergies   Allergen Reactions    Ezetimibe Other (See Comments)     Patient states she does not tolerate statins well. Could not specify the reaction. 6.4.24    Fenofibrate Other (See Comments)    Nifedipine Other (See Comments)     Years ago, does not remember.       Years ago, does not remember.    Statins Myalgia           Review of Systems       Vitals:    06/27/25 1308   BP: 129/65   Resp: 18   SpO2: 97%      Physical Exam  Constitutional:       General: She is not in acute distress.  HENT:      Head: Normocephalic and atraumatic.   Pulmonary:      Effort: Pulmonary effort is normal. No respiratory distress.   Chest:      Comments: Patient has slight increased swelling in right extremity consistent with lymphedema. She has no palpable evidence of disease recurrence. The lumpectomy site is still palpable but appears stable in size over time. She has no palpable adenopathy bilaterally.   Neurological:      Mental Status: She is alert and oriented to person, place, and time. Mental status is at baseline.   Psychiatric:         Mood and Affect: Mood normal.         Behavior: Behavior normal.          ECOG:  Restricted in physically strenuous activity but ambulatory and able to carry out work of a light or sedentary nature, e.g., light house work, office work = 1          Assessment and Plan       Assessment:          Jeannie Ruiz is a 75 y.o. female with a history of NSTEMI, multivessel coronary artery disease and coronary artery bypass with a left internal mammary artery graft on 6/7/2024 who presents with a Stage IB (pT2, pN1a,  cM0, G2, ER+, [place]+, HER2-, Oncotype DX score: 25) right breast invasive ductal carcinoma. She underwent lumpectomy and SLNB followed by axillary node dissection on 12/19/2024. She completed adjuvant right breast and regional zenaida radiation on 3/24/2025, 4250 cGy over 16 fractions. She presents for routine follow-up and skin check.    Diagnoses and all orders for this visit:    1. Malignant neoplasm of upper-outer quadrant of right breast in female, estrogen receptor positive (Primary)       Plan:      Orders:  - Follow-up in 6 months or sooner as clinically indicated  - Continue follow-up and management with Med Onc for her breast cancer  - Continue follow-up and management with Cardiology for her heart    We reviewed the patient's recent history including the multiple heart stents that were recently placed. Her cough and chest pains/tightness have improved since her procedure. She is still fatigued. She has no signs of recurrence or progression of her breast cancer. We discussed plans to follow-up in 6 months or sooner as clinically indicated.     I spent 20 minutes caring for Jeannie on this date of service. This time includes time spent by me in the following activities:preparing for the visit, reviewing tests, obtaining and/or reviewing a separately obtained history, performing a medically appropriate examination and/or evaluation , counseling and educating the patient/family/caregiver, documenting information in the medical record, and independently interpreting results and communicating that information with the patient/family/caregiver        Follow-Up       No follow-ups on file.       CC: Seymour Aldridge MD

## 2025-06-27 ENCOUNTER — OFFICE VISIT (OUTPATIENT)
Dept: RADIATION ONCOLOGY | Facility: HOSPITAL | Age: 75
End: 2025-06-27
Payer: MEDICARE

## 2025-06-27 VITALS
DIASTOLIC BLOOD PRESSURE: 65 MMHG | RESPIRATION RATE: 18 BRPM | WEIGHT: 207 LBS | OXYGEN SATURATION: 97 % | SYSTOLIC BLOOD PRESSURE: 129 MMHG | BODY MASS INDEX: 35.53 KG/M2

## 2025-06-27 DIAGNOSIS — C50.411 MALIGNANT NEOPLASM OF UPPER-OUTER QUADRANT OF RIGHT BREAST IN FEMALE, ESTROGEN RECEPTOR POSITIVE: Primary | ICD-10-CM

## 2025-06-27 DIAGNOSIS — Z17.0 MALIGNANT NEOPLASM OF UPPER-OUTER QUADRANT OF RIGHT BREAST IN FEMALE, ESTROGEN RECEPTOR POSITIVE: Primary | ICD-10-CM

## 2025-06-27 PROCEDURE — G0463 HOSPITAL OUTPT CLINIC VISIT: HCPCS | Performed by: INTERNAL MEDICINE

## 2025-08-04 DIAGNOSIS — Z17.0 MALIGNANT NEOPLASM OF UPPER-OUTER QUADRANT OF RIGHT BREAST IN FEMALE, ESTROGEN RECEPTOR POSITIVE: Primary | ICD-10-CM

## 2025-08-04 DIAGNOSIS — C50.411 MALIGNANT NEOPLASM OF UPPER-OUTER QUADRANT OF RIGHT BREAST IN FEMALE, ESTROGEN RECEPTOR POSITIVE: Primary | ICD-10-CM

## 2025-08-21 ENCOUNTER — OFFICE VISIT (OUTPATIENT)
Dept: RADIATION ONCOLOGY | Facility: HOSPITAL | Age: 75
End: 2025-08-21
Payer: MEDICARE

## 2025-08-21 VITALS
DIASTOLIC BLOOD PRESSURE: 67 MMHG | OXYGEN SATURATION: 97 % | BODY MASS INDEX: 34.84 KG/M2 | WEIGHT: 203 LBS | SYSTOLIC BLOOD PRESSURE: 105 MMHG | HEART RATE: 80 BPM | RESPIRATION RATE: 18 BRPM

## 2025-08-21 DIAGNOSIS — Z17.0 MALIGNANT NEOPLASM OF UPPER-OUTER QUADRANT OF RIGHT BREAST IN FEMALE, ESTROGEN RECEPTOR POSITIVE: Primary | ICD-10-CM

## 2025-08-21 DIAGNOSIS — C50.411 MALIGNANT NEOPLASM OF UPPER-OUTER QUADRANT OF RIGHT BREAST IN FEMALE, ESTROGEN RECEPTOR POSITIVE: Primary | ICD-10-CM

## 2025-08-21 PROCEDURE — G0463 HOSPITAL OUTPT CLINIC VISIT: HCPCS | Performed by: INTERNAL MEDICINE

## (undated) DEVICE — PK PERFUS CUST W/CARDIOPLEGIA

## (undated) DEVICE — TOWEL,OR,DSP,ST,WHITE,DLX,4/PK,20PK/CS: Brand: MEDLINE

## (undated) DEVICE — SUP ARMBRD HANDAID ART/LINE 9IN

## (undated) DEVICE — SUT PROLN 4/0 V7 36IN 8975H

## (undated) DEVICE — CANN AORT ROOT DLP VNT/8IN 14G 7F

## (undated) DEVICE — CATH ART RADL 20GA 1 3/4IN LF

## (undated) DEVICE — PRESSURE TUBING: Brand: TRUWAVE

## (undated) DEVICE — BNDG,ELSTC,MATRIX,STRL,4"X5YD,LF,HOOK&LP: Brand: MEDLINE

## (undated) DEVICE — ELECTRD DEFIB M/FUNC PROPADZ STRL 2PK

## (undated) DEVICE — Device

## (undated) DEVICE — BLD SCLPL BEAVR MINI STR 2BVL 180D LF

## (undated) DEVICE — ST IV BLD W/HANDPUMP YSITE 125IN

## (undated) DEVICE — GLV SURG TRIUMPH NATURAL W/ALOE PF LTX 6.5 STRL

## (undated) DEVICE — 3M™ TEGADERM™ I.V. ADVANCED SECUREMENT DRESSING, 1685, 3-1/2 IN X 4-1/2 IN (8.5 CM X 11.5 CM), 50/CT 4CT/CASE: Brand: 3M™ TEGADERM™

## (undated) DEVICE — KT CATH CV ACC MAC 2L SFTY 9F 4 1/2IN

## (undated) DEVICE — ROTATING SURGICAL PUNCHES, 1 PER POUCH: Brand: A&E MEDICAL / ROTATING SURGICAL PUNCHES

## (undated) DEVICE — SOL NACL 0.9PCT 1000ML

## (undated) DEVICE — SUT PROLN 6/0 RB2 D/A 30IN 8711H

## (undated) DEVICE — BNDG,ELSTC,MATRIX,STRL,6"X5YD,LF,HOOK&LP: Brand: MEDLINE

## (undated) DEVICE — SUT PROLENE PP 7/0 BV175-6 24IN

## (undated) DEVICE — CANN ART EOPA 3D NV W/CONN 20F

## (undated) DEVICE — 3M™ TEGADERM™ IV TRANSPARENT FILM DRESSING WITH BORDER 100 BAGS/CARTON 4 CARTONS/CASE 1633: Brand: 3M™ TEGADERM™

## (undated) DEVICE — BG BLD SYS

## (undated) DEVICE — 500ML,PRESSURE INFUSER W/STOPCOCK: Brand: MEDLINE

## (undated) DEVICE — BLOWER MISTER CLEARVIEW W/TBG

## (undated) DEVICE — SAFELINER OUTER SHELL SUCTION CANISTER: Brand: DEROYAL

## (undated) DEVICE — CONNECT Y INTERSEPT W/LL 3/8 X 3/8 X 3/8IN

## (undated) DEVICE — GLV SURG BIOGEL LTX PF 7 1/2

## (undated) DEVICE — SUT SILK 0 CT1 CR8 18IN C021D

## (undated) DEVICE — SENSR CERBRL O2 PK/2

## (undated) DEVICE — SOL NACL INJ .9PCT 500ML

## (undated) DEVICE — SUT SILK 2/0 SH CR8 18IN CR8 C012D

## (undated) DEVICE — SUT SILK 2/0 TIES 18IN A185H

## (undated) DEVICE — SUT PROLN 5/0 V5 36IN 8934H

## (undated) DEVICE — PK OPN HEART WHT WRP 50

## (undated) DEVICE — SUT SILK 2 SUTUPAK TIE 60IN SA8H 2STRAND

## (undated) DEVICE — SYS PERFUS SEP PLATLT W TIPS CUST

## (undated) DEVICE — PRESSURE MONITORING SET: Brand: TRUWAVE, VAMP PLUS

## (undated) DEVICE — ELECTRD BLD EZ CLN STD 6.5IN

## (undated) DEVICE — CABL BIPOL W/ALLGTR CLIP/SM 12FT

## (undated) DEVICE — THE SAPHENA MEDICAL ONEPASS ENDOSCOPIC VESSEL HARVESTING SYSTEM IS INDICATED FOR USE IN MINIMALLY INVASIVE SURGERY ALLOWING ACCESS FOR VESSEL HARVESTING, AND IS PRIMARILY INDICATED FOR PATIENTS UNDERGOING ENDOSCOPIC SURGERY FOR ARTERIAL BYPASS. IT IS INDICATED FOR CUTTING TISSUE AND CONTROLLING BLEEDING THROUGH COAGULATION, AND FOR PATIENTS REQUIRING BLUNT DISSECTION OF TISSUE INCLUDING DISSECTION OF BLOOD VESSELS, DISSECTION OF BLOOD VESSELS OF THE EXTREMITIES, DISSECTION OF DUCTS AND OTHER STRUCTURES IN THE EXTRA PERITONEALL OR SUBCUTANEOUS EXTREMITY AND THORACIC SPACE. EXTREMITY PROCEDURES INCLUDE TISSUE DISSECTION ALONG THE SAPHENOUS VEIN FOR USE IN CORONARY ARTERY BYPASS GRAFTING AND PERIPHERAL ARTERY BYPASS OR RADIAL ARTERY FOR USE IN CORONARY ARTERY BYPASS GRAFTING. THORASCOPIC PROCEDURES INCLUDE EXPOSURE AND DISSECTION OF STRUCTURES EXTERNAL TOTHE PARIETAL PLEURA, INCLUDING NERVES, BLOOD VESSELS, AND OTHER TISSUES OF THE CHEST WALL.: Brand: VENAPAX

## (undated) DEVICE — TBG INSUFF MALE L/L W 12MM CON: Brand: MEDLINE INDUSTRIES, INC.

## (undated) DEVICE — BIOPATCH™ ANTIMICROBIAL DRESSING WITH CHLORHEXIDINE GLUCONATE IS A HYDROPHILLIC POLYURETHANE ABSORPTIVE FOAM WITH CHLORHEXIDINE GLUCONATE (CHG) WHICH INHIBITS BACTERIAL GROWTH UNDER THE DRESSING. THE DRESSING IS INTENDED TO BE USED TO ABSORB EXUDATE, COVER A WOUND CAUSED BY VASCULAR AND NONVASCULAR PERCUTANEOUS MEDICAL DEVICES DURING SURGERY, AS WELL AS REDUCE LOCAL INFECTION AND COLONIZATION OF MICROORGANISMS.: Brand: BIOPATCH

## (undated) DEVICE — CORONARY ARTERY BYPASS GRAFT MARKERS, STAINLESS STEEL, DISTAL, WITHOUT HOLDER: Brand: ANASTOMARK CORONARY ARTERY BYPASS GRAFT MARKERS, STAINLESS STEEL, DISTAL

## (undated) DEVICE — ANTIBACTERIAL UNDYED BRAIDED (POLYGLACTIN 910), SYNTHETIC ABSORBABLE SUTURE: Brand: COATED VICRYL

## (undated) DEVICE — SOL IRR H2O BTL 1000ML STRL

## (undated) DEVICE — 28 FR STRAIGHT – SOFT PVC CATHETER: Brand: PVC THORACIC CATHETERS

## (undated) DEVICE — BLOOD TRANSFUSION FILTER: Brand: HAEMONETICS

## (undated) DEVICE — SUT PROLN 7/0 BV1 D/A 30IN 8703H

## (undated) DEVICE — DRAPE SHEET ULTRAGARD: Brand: MEDLINE

## (undated) DEVICE — CATH TDILUT SWANGANZ VIP 7.5F 110CM

## (undated) DEVICE — TUBING, SUCTION, 1/4" X 12', STRAIGHT: Brand: MEDLINE

## (undated) DEVICE — BINDER: Brand: DEROYAL

## (undated) DEVICE — INTENDED FOR TISSUE SEPARATION, AND OTHER PROCEDURES THAT REQUIRE A SHARP SURGICAL BLADE TO PUNCTURE OR CUT.: Brand: BARD-PARKER ® CARBON RIB-BACK BLADES

## (undated) DEVICE — ST BLD COL SAFETYLOK LS 23GA 3/4IN 7IN

## (undated) DEVICE — SUT SILK 4/0 TIES 18IN A183H

## (undated) DEVICE — SUT PROLN 5/0 RB1 D/A 36IN 8556H

## (undated) DEVICE — HEMOCONCENTRATOR PERFUS LPS06

## (undated) DEVICE — DRSNG SLVR/ANTIBAC PRIMASEAL POST/OP ADHS 3.5X10IN

## (undated) DEVICE — SUT PROLN 4/0 V5 36IN 8935H

## (undated) DEVICE — SOL IRR NACL 0.9PCT BT 1000ML

## (undated) DEVICE — PK ATS CUST W CARDIOTOMY RESEVOIR

## (undated) DEVICE — SPNG GZ AVANT 6PLY 4X4IN STRL PK/2